# Patient Record
Sex: MALE | Race: WHITE | NOT HISPANIC OR LATINO | Employment: OTHER | URBAN - METROPOLITAN AREA
[De-identification: names, ages, dates, MRNs, and addresses within clinical notes are randomized per-mention and may not be internally consistent; named-entity substitution may affect disease eponyms.]

---

## 2017-01-24 ENCOUNTER — APPOINTMENT (OUTPATIENT)
Dept: LAB | Facility: CLINIC | Age: 82
End: 2017-01-24
Payer: COMMERCIAL

## 2017-01-24 DIAGNOSIS — I35.9 NONRHEUMATIC AORTIC VALVE DISORDER: ICD-10-CM

## 2017-01-24 DIAGNOSIS — I10 ESSENTIAL (PRIMARY) HYPERTENSION: ICD-10-CM

## 2017-01-24 LAB
ANION GAP SERPL CALCULATED.3IONS-SCNC: 8 MMOL/L (ref 4–13)
BUN SERPL-MCNC: 16 MG/DL (ref 5–25)
CALCIUM SERPL-MCNC: 8.9 MG/DL (ref 8.3–10.1)
CHLORIDE SERPL-SCNC: 106 MMOL/L (ref 100–108)
CO2 SERPL-SCNC: 28 MMOL/L (ref 21–32)
CREAT SERPL-MCNC: 0.69 MG/DL (ref 0.6–1.3)
GFR SERPL CREATININE-BSD FRML MDRD: >60 ML/MIN/1.73SQ M
GLUCOSE SERPL-MCNC: 121 MG/DL (ref 65–140)
POTASSIUM SERPL-SCNC: 3.7 MMOL/L (ref 3.5–5.3)
SODIUM SERPL-SCNC: 142 MMOL/L (ref 136–145)

## 2017-01-24 PROCEDURE — 36415 COLL VENOUS BLD VENIPUNCTURE: CPT

## 2017-01-24 PROCEDURE — 80048 BASIC METABOLIC PNL TOTAL CA: CPT

## 2017-02-01 ENCOUNTER — ALLSCRIPTS OFFICE VISIT (OUTPATIENT)
Dept: OTHER | Facility: OTHER | Age: 82
End: 2017-02-01

## 2017-02-27 ENCOUNTER — GENERIC CONVERSION - ENCOUNTER (OUTPATIENT)
Dept: OTHER | Facility: OTHER | Age: 82
End: 2017-02-27

## 2017-05-01 ENCOUNTER — ALLSCRIPTS OFFICE VISIT (OUTPATIENT)
Dept: OTHER | Facility: OTHER | Age: 82
End: 2017-05-01

## 2017-05-18 ENCOUNTER — ALLSCRIPTS OFFICE VISIT (OUTPATIENT)
Dept: OTHER | Facility: OTHER | Age: 82
End: 2017-05-18

## 2017-06-12 ENCOUNTER — ALLSCRIPTS OFFICE VISIT (OUTPATIENT)
Dept: OTHER | Facility: OTHER | Age: 82
End: 2017-06-12

## 2017-08-01 ENCOUNTER — ALLSCRIPTS OFFICE VISIT (OUTPATIENT)
Dept: OTHER | Facility: OTHER | Age: 82
End: 2017-08-01

## 2017-10-16 ENCOUNTER — ALLSCRIPTS OFFICE VISIT (OUTPATIENT)
Dept: OTHER | Facility: OTHER | Age: 82
End: 2017-10-16

## 2017-10-16 ENCOUNTER — GENERIC CONVERSION - ENCOUNTER (OUTPATIENT)
Dept: OTHER | Facility: OTHER | Age: 82
End: 2017-10-16

## 2017-12-04 ENCOUNTER — ALLSCRIPTS OFFICE VISIT (OUTPATIENT)
Dept: OTHER | Facility: OTHER | Age: 82
End: 2017-12-04

## 2017-12-05 NOTE — PROGRESS NOTES
Assessment  Assessed    1  Aortic valve disease (424 1) (I35 9)   2  Hypertension (401 9) (I10)   3  Pacemaker (V45 01) (Z95 0)   4  LVH (left ventricular hypertrophy) (429 3) (I51 7)    Plan  Aortic valve disease, Hypertension, LVH (left ventricular hypertrophy)    · Follow-up visit in 6 months Evaluation and Treatment  Follow-up  Status: Hold For -Scheduling  Requested for: 75YIB1857   Ordered; For: Aortic valve disease, Hypertension, LVH (left ventricular hypertrophy); Ordered By: Irineo Pan Performed:  Due: 19LJL9529  Atypical chest pain, Pacemaker    · EKG/ECG- POC; Status:Complete;   Done: 07SYL4524 02:01PM   Perform: In Office; Last Updated Yesy Knightsen; 12/4/2017 2:01:53 PM;Ordered;chest pain, Pacemaker; Ordered By:Karan Eller; Discussion/Summary  Cardiology Discussion Summary Free Text Note Form [facility]:   I will continue his present medical regimen  He appears well compensated  I've asked him to call if there is a problem in the interim otherwise I will see him in follow-up in 6 months time  Chief Complaint  Chief Complaint Free Text Note Form: 1 yr F/U with EKG      History of Present Illness  Cardiology HPI Free Text Note Form [facility]: Patient is here for a follow-up visit  He was last seen by me in June of this year  Since that time he has had ongoing interrogation of his Guidant dual-chamber pacemaker and it is functioning appropriately  A prior echo done in September of 2016 demonstrated preserved LV systolic function with mild LVH and no significant valvular heart disease  He has felt well  He has had no chest pain or significant dyspnea  Aortic Regurgitation (Brief): The patient is being seen for a routine clinic follow-up of aortic valve regurgitation  The patient is currently asymptomatic  Cardiomegaly: The patient is being seen for a routine clinic follow-up of cardiomegaly  The patient is currently asymptomatic  Hypertension (Follow-Up):  The patient presents for follow-up of essential hypertension  The patient states he has been doing well with his blood pressure control since the last visit  Symptoms:      Review of Systems  Cardiology Male ROS:    Cardiac: No complaints of chest pain, no palpitations, no fainiting  Skin: No complaints of nonhealing sores or skin rash  Genitourinary: No complaints of recurrent urinary tract infections, frequent urination at night, difficult urination, blood in urine, kidney stones, loss of bladder control, no kidney or prostate problems, no erectile dysfunction  Psychological: No complaints of feeling depressed, anxiety, panic attacks, or difficulty concentrating  General: No complaints of trouble sleeping, lack of energy, fatigue, appetite changes, weight changes, fever, frequent infections, or night sweats  Respiratory: No complaints of shortness of breath, cough with sputum, or wheezing  HEENT: No complaints of serious problems, hearing problems, nose problems, throat problems, or snoring  Gastrointestinal: No complaints of liver problems, nausea, vomiting, heartburn, constipation, bloody stools, diarrhea, problems swallowing, adbominal pain, or rectal bleeding  Hematologic: No complaints of bleeding disorders, anemia, blood clots, or excessive brusing  Neurological: No complaints of numbness, tingling, dizziness, weakness, seizures, headaches, syncope or fainting, AM fatigue, daytime sleepiness, no witnessed apnea episodes  Musculoskeletal: back pain, but-- No complaints of arthritis, back pain, or painfull swelling  Active Problems  Problems    1  Aortic valve disease (424 1) (I35 9)   2  Atypical chest pain (786 59) (R07 89)   3  Basal Cell Adenocarcinoma (199 1)   4  Bilateral shoulder pain, unspecified chronicity (719 41) (M25 511,M25 512)   5  Cervical radiculopathy (723 4) (M54 12)   6  Gastroenteritis (558 9) (K52 9)   7  Hypertension (401 9) (I10)   8  Hypokalemia (276 8) (E87 6)   9   LVH (left ventricular hypertrophy) (429 3) (I51 7)   10  Neck pain on right side (723 1) (M54 2)   11  Need for prophylactic vaccination and inoculation against influenza (V04 81) (Z23)   12  Pacemaker (V45 01) (Z95 0)   13  Shoulder pain (719 41) (M25 519)   14  Small bowel obstruction (560 9) (K56 609)   15  Spinal stenosis (724 00) (M48 00)   16  Upper respiratory infection (465 9) (J06 9)    Past Medical History  Problems    1  History of Bradycardia (427 89) (R00 1)   2  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)   3  Need for prophylactic vaccination and inoculation against influenza (V04 81) (Z23)  Active Problems And Past Medical History Reviewed: The active problems and past medical history were reviewed and updated today  Surgical History  Problems    1  History of Cholecystectomy   2  History of Pacemaker Permanent Placement Dual-Chamber    Family History  Mother    1  Family history of Diabetes   2  Family history of Heart disease    Social History  Problems    · Never a smoker   · No alcohol use   · Retired    Current Meds   1  AmLODIPine Besylate 5 MG Oral Tablet; TAKE 1 TABLET TWICE DAILY  Requested for: 63PWS5451; Last Rx:51Qma2790 Ordered   2  B Complex 50 Oral Tablet; TAKE 1 TABLET DAILY; Therapy: 60NOB7856 to Recorded   3  HydroCHLOROthiazide 12 5 MG Oral Capsule; take 1 capsule daily; Therapy: 79TGS8442 to (Evaluate:03Mar2018)  Requested for: 18RHH5621; Last Rx:85Coe4813 Ordered   4  Multiple Vitamins Oral Tablet; TAKE 1 TABLET DAILY; Therapy: 57JBO5459 to Recorded   5  Potassium Chloride ER 20 MEQ Oral Tablet Extended Release; take 2 tablet daily; Therapy: 83AFE8358 to (Evaluate:05Kmt6056)  Requested for: 25Mkt2922; Last Rx:10Gvf8048 Ordered  Medication List Reviewed: The medication list was reviewed and updated today  Allergies  Medication    1   No Known Drug Allergies    Vitals  Vital Signs    Recorded: 44UFO4237 01:58PM   Heart Rate 72, R Radial   Pulse Quality Regular, R Radial   Respiration 16   Systolic 525, RUE, Sitting   Diastolic 68, RUE, Sitting   Height 6 ft 2 in   Weight 216 lb 4 oz   BMI Calculated 27 77   BSA Calculated 2 25       Physical Exam   Constitutional  General appearance: No acute distress, well appearing and well nourished  Eyes  Conjunctiva and Sclera examination: Conjunctiva pink, sclera anicteric  Ears, Nose, Mouth, and Throat - Oropharynx: Clear, nares are clear, mucous membranes are moist   Neck  Neck and thyroid: Normal, supple, trachea midline, no thyromegaly  Pulmonary  Respiratory effort: No increased work of breathing or signs of respiratory distress  Auscultation of lungs: Clear to auscultation, no rales, no rhonchi, no wheezing, good air movement  Cardiovascular  Palpation of heart: Normal PMI, no thrills  Auscultation of heart: Normal rate and rhythm, normal S1 and S2, no murmurs  Carotid pulses: Normal, 2+ bilaterally  Examination of extremities for edema and/or varicosities: Normal    Chest - Chest: Normal   Musculoskeletal Gait and station: Normal gait  -- Digits and nails: Normal without clubbing or cyanosis  Neurologic - Speech: Normal    Psychiatric - Orientation to person, place, and time: Normal -- Mood and affect: Normal       Results/Data  ECG Report: Sinus bradycardia with first degree AV block and left axis deviation      Health Management  Health Maintenance   Medicare Annual Wellness Visit; every 1 year; Next Due: 70WFD3018;  Active    Future Appointments    Date/Time Provider Specialty Site   02/01/2018 02:00 PM Cardiology, [facility]       Signatures   Electronically signed by : MIGDALIA Sparks ; Dec  4 2017  2:18PM EST                       (Author)

## 2018-01-11 NOTE — MISCELLANEOUS
Message   Recorded as Task   Date: 07/19/2016 11:24 AM, Created By: Siena Sams   Task Name: Follow Up   Assigned To: Randi Silva procedure,Team   Regarding Patient: Lucille Andre, Status: Active   Lala Hicks - 19 Jul 2016 11:24 AM     TASK CREATED  pt is s/p  NOHEMY (M50 12)on07/15/16Eulalia Mon  - 26 Jul 2016 11:47 AM     TASK EDITED  no pain diary scanned in,  no follow up scheduled   Mackenzie Oscar - 28 Jul 2016 11:29 AM     TASK EDITED  Spoke with pt who received 80% relief from inj  Pt reports being able to sleep at night and just some occ soreness in shoulder  The r hand numb and tingling is much better and he feels more strength in hand  Pt asked if he could do massage therapy for muscle soreness  Pt will f/u PRN  Muna Woods - 28 Jul 2016 11:39 AM     TASK REPLIED TO: Previously Assigned To Muna Woods  sure ok to do massage therapy   Mackenzie Oscar - 28 Jul 2016 11:40 AM     TASK EDITED  Pt aware  Active Problems    1  Aortic valve disease (424 1) (I35 9)   2  Atypical chest pain (786 59) (R07 89)   3  Basal Cell Adenocarcinoma (199 1)   4  Bilateral shoulder pain, unspecified chronicity (719 41) (M25 511,M25 512)   5  Cervical radiculopathy (723 4) (M54 12)   6  Gastroenteritis (558 9) (K52 9)   7  Hypertension (401 9) (I10)   8  Hypokalemia (276 8) (E87 6)   9  LVH (left ventricular hypertrophy) (429 3) (I51 7)   10  Neck pain on right side (723 1) (M54 2)   11  Need for prophylactic vaccination and inoculation against influenza (V04 81) (Z23)   12  Pacemaker (V45 01) (Z95 0)   13  Shoulder pain (719 41) (M25 519)   14  Small bowel obstruction (560 9) (K56 69)   15  Spinal stenosis (724 00) (M48 00)   16  Upper respiratory infection (465 9) (J06 9)    Current Meds   1  AmLODIPine Besylate 5 MG Oral Tablet (Norvasc); TAKE 1 TABLET TWICE DAILY    Requested for: 86HZQ5219;  Last Rx:24Nov2015 Ordered   2  B Complex 50 Oral Tablet; TAKE 1 TABLET DAILY; Therapy: 77GEX9349 to Recorded   3  Hydrochlorothiazide 12 5 MG Oral Capsule; TAKE 1 CAPSULE ONCE DAILY  Requested   for: 21Mar2016; Last Rx:21Mar2016 Ordered   4  Ibuprofen TABS; TAKE TABLET  PRN; Therapy: (Recorded:23Jun2016) to Recorded   5  Multiple Vitamins Oral Tablet; TAKE 1 TABLET DAILY; Therapy: 01DZO9323 to Recorded    Allergies    1   No Known Drug Allergies    Signatures   Electronically signed by : Iker Mason, ; Jul 28 2016 11:40AM EST                       (Author)

## 2018-01-12 NOTE — RESULT NOTES
Verified Results  (1) CBC/PLT/DIFF 05GXP8161 86:39RK Leighton Robles   TW Order Number: [id number]    TW Order Number: [id number]     Test Name Result Flag Reference   WBC COUNT 9 22 Thousand/uL  4 31-10 16   RBC COUNT 4 17 Million/uL  3 88-5 62   HEMOGLOBIN 13 9 g/dL  12 0-17 0   HEMATOCRIT 40 2 %  36 5-49 3   MCV 96 fL  82-98   MCH 33 3 pg  26 8-34 3   MCHC 34 6 g/dL  31 4-37 4   RDW 13 8 %  11 6-15 1   MPV 11 1 fL  8 9-12 7   PLATELET COUNT 407 Thousands/uL  149-390   nRBC AUTOMATED 0 /100 WBCs     NEUTROPHILS RELATIVE PERCENT 72 %  43-75   LYMPHOCYTES RELATIVE PERCENT 16 %  14-44   MONOCYTES RELATIVE PERCENT 9 %  4-12   EOSINOPHILS RELATIVE PERCENT 3 %  0-6   BASOPHILS RELATIVE PERCENT 0 %  0-1   NEUTROPHILS ABSOLUTE COUNT 6 63 Thousands/µL  1 85-7 62   LYMPHOCYTES ABSOLUTE COUNT 1 43 Thousands/µL  0 60-4 47   MONOCYTES ABSOLUTE COUNT 0 78 Thousand/µL  0 17-1 22   EOSINOPHILS ABSOLUTE COUNT 0 31 Thousand/µL  0 00-0 61   BASOPHILS ABSOLUTE COUNT 0 04 Thousands/µL  0 00-0 10     (1) COMPREHENSIVE METABOLIC PANEL 56QRO3521 73:16TV Leighton Robles    Order Number: [id number]      National Kidney Disease Education Program recommendations are as follows:  GFR calculation is accurate only with a steady state creatinine  Chronic Kidney disease less than 60 ml/min/1 73 sq  meters  Kidney failure less than 15 ml/min/1 73 sq  meters  Test Name Result Flag Reference   GLUCOSE,RANDM 96 mg/dL     If the patient is fasting, the ADA then defines impaired fasting glucose as > 100 mg/dL and diabetes as > or equal to 123 mg/dL     SODIUM 141 mmol/L  136-145   POTASSIUM 3 3 mmol/L L 3 5-5 3   CHLORIDE 103 mmol/L  100-108   CARBON DIOXIDE 29 mmol/L  21-32   ANION GAP (CALC) 9 mmol/L  4-13   BLOOD UREA NITROGEN 18 mg/dL  5-25   CREATININE 0 73 mg/dL  0 60-1 30   Standardized to IDMS reference method   CALCIUM 8 6 mg/dL  8 3-10 1   BILI, TOTAL 0 93 mg/dL  0 20-1 00   ALK PHOSPHATAS 60 U/L     ALT (SGPT) 29 U/L  12-78 AST(SGOT) 31 U/L  5-45   ALBUMIN 3 9 g/dL  3 5-5 0   TOTAL PROTEIN 6 9 g/dL  6 4-8 2   eGFR Non-African American      >60 0 ml/min/1 73sq m     (1) LIPID PANEL, FASTING 27CVD3471 30:67RX Kevfrieda Heredia Order Number: [id number]      Triglyceride:         Normal              <150 mg/dl       Borderline High    150-199 mg/dl       High               200-499 mg/dl       Very High          >499 mg/dl  Cholesterol:         Desirable        <200 mg/dl      Borderline High  200-239 mg/dl      High             >239 mg/dl  HDL Cholesterol:        High    >59 mg/dL      Low     <41 mg/dL     Test Name Result Flag Reference   CHOLESTEROL 168 mg/dL     HDL,DIRECT 56 mg/dL  40-60   LDL CHOLESTEROL CALCULATED 101 mg/dL H 0-100   TRIGLYCERIDES 57 mg/dL  <=150   Specimen collection should occur prior to N-Acetylcysteine or Metamizole administration due to the potential for falsely depressed results  (1) TSH 23AAJ2670 66:83YA Lissa Heredia Order Number: [id number]    Patients undergoing fluorescein dye angiography may retain small amounts of fluorescein in the body for 48-72 hours post procedure  Samples containing fluorescein can produce falsely depressed TSH values  If the patient had this procedure,a specimen should be resubmitted post fluorescein clearance  Test Name Result Flag Reference   TSH 3 790 uIU/mL H 0 358-3 740     (1) VITAMIN D 25-HYDROXY 71LQU3900 52:98KU Lissa Heredia Order Number: [id number]     Order Number: [id number]     Test Name Result Flag Reference   VIT D 25-HYDROX 25 9 ng/mL L 30 0-100 0       Plan  Hypertension    · (1) BNP; Status:Hold For - Exact Date; Requested for:May 2016;     Discussion/Summary   bw shows vit D low at 25  I would increase otc Vit D3 by 1000 units/day  Also potassium still low  I would take script for potassium once daily for 10 days only and mail out diet sheet to INCREASE potassium  I will send in to pharm   recheck bw in 4 weeks

## 2018-01-13 NOTE — MISCELLANEOUS
Message   Recorded as Task   Date: 01/18/2017 12:05 PM, Created By: Tara Chowdary   Task Name: Follow Up   Assigned To: [facility] joseph clinical,Team   Regarding Patient: Yandel Sinclair, Status: In Progress   Comment:    Dana Molina - 18 Jan 2017 12:05 PM     TASK CREATED  Caller: [employer]; Authorization Status; [phone]  TC from [facility] at [facility] stating a Peer to Peer needs to be done with [facility] for the Cervical Spine CT that was done on 6/28/16  [facility] denied the initial authorization so payment has not been made  I informed [facility] that Dr Liam Andrade will not be back in the office until 1/23/17  Pt's ID # [id number]  [facility] ph # [phone]  Lester Hernandez - 06 Feb 2017 5:36 PM     TASK REPLIED TO: Previously Assigned To [facility] joseph clinical,Team   2 messages have been left and they have not contacted me back   Rachel Schofield - 07 Feb 2017 9:06 AM     TASK EDITED        Active Problems    1  Aortic valve disease (424 1) (I35 9)   2  Atypical chest pain (786 59) (R07 89)   3  Basal Cell Adenocarcinoma (199 1)   4  Bilateral shoulder pain, unspecified chronicity (719 41) (M25 511,M25 512)   5  Cervical radiculopathy (723 4) (M54 12)   6  Gastroenteritis (558 9) (K52 9)   7  Hypertension (401 9) (I10)   8  Hypokalemia (276 8) (E87 6)   9  LVH (left ventricular hypertrophy) (429 3) (I51 7)   10  Neck pain on right side (723 1) (M54 2)   11  Need for prophylactic vaccination and inoculation against influenza (V04 81) (Z23)   12  Pacemaker (V45 01) (Z95 0)   13  Shoulder pain (719 41) (M25 519)   14  Small bowel obstruction (560 9) (K56 69)   15  Spinal stenosis (724 00) (M48 00)   16  Upper respiratory infection (465 9) (J06 9)    Current Meds   1  AmLODIPine Besylate 5 MG Oral Tablet (Norvasc); TAKE 1 TABLET TWICE DAILY    Requested for: 21HXP3473; Last Rx:70Due4753 Ordered   2  B Complex 50 Oral Tablet; TAKE 1 TABLET DAILY; Therapy: 99XJT9352 to Recorded   3  HydroCHLOROthiazide 12 5 MG Oral Capsule; TAKE 1 CAPSULE ONCE DAILY    Requested for: 21Mar2016; Last Rx:21Mar2016 Ordered   4  Ibuprofen TABS; TAKE TABLET  PRN; Therapy: (Recorded:23Jun2016) to Recorded   5  Multiple Vitamins Oral Tablet; TAKE 1 TABLET DAILY; Therapy: 76WPY6241 to Recorded   6  Potassium Chloride ER 20 MEQ Oral Tablet Extended Release; take 2 tablet daily; Therapy: 99BEE2832 to (Evaluate:84Luk5703)  Requested for: 27Dec2016; Last   Rx:91Xqr8518 Ordered    Allergies    1   No Known Drug Allergies    Signatures   Electronically signed by : Martin Louise, ; Feb 27 2017  3:23PM EST                       (Author)

## 2018-01-14 VITALS
RESPIRATION RATE: 18 BRPM | DIASTOLIC BLOOD PRESSURE: 80 MMHG | BODY MASS INDEX: 27.9 KG/M2 | WEIGHT: 217.38 LBS | SYSTOLIC BLOOD PRESSURE: 140 MMHG | HEIGHT: 74 IN | TEMPERATURE: 96 F | HEART RATE: 60 BPM

## 2018-01-15 VITALS
WEIGHT: 218 LBS | DIASTOLIC BLOOD PRESSURE: 72 MMHG | HEIGHT: 74 IN | SYSTOLIC BLOOD PRESSURE: 142 MMHG | HEART RATE: 66 BPM | BODY MASS INDEX: 27.98 KG/M2

## 2018-01-15 NOTE — RESULT NOTES
Message   Recorded as Task   Date: 06/29/2016 10:14 AM, Created By: Torres Rajan   Task Name: Follow Up   Assigned To: Harpreet Soler   Regarding Patient: Radha Lopez, Status: Active   CommentAdrien Kanner - 29 Jun 2016 10:14 AM     TASK CREATED  reviewed CT scan results with patient; please schedule cervical BARBARA (Dx M50 12)   Dana Molina - 29 Jun 2016 3:10 PM     TASK REASSIGNED: Previously Assigned To [address] [place] [address] - 30 Jun 2016 10:55 AM     TASK REASSIGNED: Previously Assigned To [facility] surgery sched,Cally Groves - 30 Jun 2016 2:20 PM     TASK EDITED  S/W patient - patient scheduled for Friday, July 15 at [facility] with Dr Go Parrish - patient advised nothing to eat or drink 1 hour prior to procedure - patient on Ibuprofen, advised to stop 1 day prior to injection, patient denies any abx's - patient also advised to arrange for  - patient aware and agreed        Signatures   Electronically signed by : Mahnaz Alexander, ; Jun 30 2016  2:20PM EST                       (Author)

## 2018-01-17 NOTE — PROGRESS NOTES
Chief Complaint  Patient is here to receive the high dose influenza vaccination  Active Problems    1  Aortic valve disease (424 1) (I35 9)   2  Atypical chest pain (786 59) (R07 89)   3  Basal Cell Adenocarcinoma (199 1)   4  Bilateral shoulder pain, unspecified chronicity (719 41) (M25 511,M25 512)   5  Cervical radiculopathy (723 4) (M54 12)   6  Gastroenteritis (558 9) (K52 9)   7  Hypertension (401 9) (I10)   8  Hypokalemia (276 8) (E87 6)   9  LVH (left ventricular hypertrophy) (429 3) (I51 7)   10  Neck pain on right side (723 1) (M54 2)   11  Need for prophylactic vaccination and inoculation against influenza (V04 81) (Z23)   12  Pacemaker (V45 01) (Z95 0)   13  Shoulder pain (719 41) (M25 519)   14  Small bowel obstruction (560 9) (K56 69)   15  Spinal stenosis (724 00) (M48 00)   16  Upper respiratory infection (465 9) (J06 9)    Current Meds   1  AmLODIPine Besylate 5 MG Oral Tablet; TAKE 1 TABLET TWICE DAILY  Requested for:   02RFM8908; Last Rx:24Nov2015 Ordered   2  B Complex 50 Oral Tablet; TAKE 1 TABLET DAILY; Therapy: 41ICS4643 to Recorded   3  Hydrochlorothiazide 12 5 MG Oral Capsule; TAKE 1 CAPSULE ONCE DAILY  Requested   for: 21Mar2016; Last Rx:21Mar2016 Ordered   4  Ibuprofen TABS; TAKE TABLET  PRN; Therapy: (Recorded:23Jun2016) to Recorded   5  Multiple Vitamins Oral Tablet; TAKE 1 TABLET DAILY; Therapy: 90KPL6845 to Recorded    Allergies    1   No Known Drug Allergies    Vitals  Signs    Temperature: 98 2 F    Plan  Need for prophylactic vaccination and inoculation against influenza    · Fluzone High-Dose 0 5 ML Intramuscular Suspension Prefilled Syringe    Future Appointments    Date/Time Provider Specialty Site   11/09/2016 01:00 PM Cardiology, Device Remote  [address]     Signatures   Electronically signed by : MIGDALIA Holt ; Oct 14 4829  5:06PM EST                       (Author)

## 2018-01-22 VITALS — TEMPERATURE: 97.6 F

## 2018-01-23 VITALS
BODY MASS INDEX: 27.75 KG/M2 | SYSTOLIC BLOOD PRESSURE: 140 MMHG | HEART RATE: 72 BPM | DIASTOLIC BLOOD PRESSURE: 68 MMHG | HEIGHT: 74 IN | WEIGHT: 216.25 LBS | RESPIRATION RATE: 16 BRPM

## 2018-01-23 NOTE — PROGRESS NOTES
Chief Complaint  Patient is here for High Dose Flu Vaccine  Active Problems     1  Atypical chest pain (786 59) (R07 89)   2  Basal Cell Adenocarcinoma (199 1)   3  Bilateral shoulder pain, unspecified chronicity (719 41) (M25 511,M25 512)   4  Cervical radiculopathy (723 4) (M54 12)   5  Gastroenteritis (558 9) (K52 9)   6  Hypokalemia (276 8) (E87 6)   7  Neck pain on right side (723 1) (M54 2)   8  Need for prophylactic vaccination and inoculation against influenza (V04 81) (Z23)   9  Shoulder pain (719 41) (M25 519)   10  Small bowel obstruction (560 9) (K56 609)   11  Spinal stenosis (724 00) (M48 00)   12  Upper respiratory infection (465 9) (J06 9)    Hypertension (401 9) (I10)       Aortic valve disease (424 1) (I35 9)       LVH (left ventricular hypertrophy) (429 3) (I51 7)       Pacemaker (V45 01) (Z95 0)          Current Meds   1  AmLODIPine Besylate 5 MG Oral Tablet; TAKE 1 TABLET TWICE DAILY  Requested for:   52SHY2297; Last Rx:75Ctu3777 Ordered   2  B Complex 50 Oral Tablet; TAKE 1 TABLET DAILY; Therapy: 07OLE0774 to Recorded   3  HydroCHLOROthiazide 12 5 MG Oral Capsule; take 1 capsule daily; Therapy: 92NLH1954 to (Evaluate:31Jan2018)  Requested for: 73PPH2677; Last   Rx:14Nlu3869 Ordered   4  Multiple Vitamins Oral Tablet; TAKE 1 TABLET DAILY; Therapy: 25GHR3806 to Recorded   5  Potassium Chloride ER 20 MEQ Oral Tablet Extended Release; take 2 tablet daily; Therapy: 38CQD3177 to (Evaluate:66Pko7308)  Requested for: 47Tyb4871; Last   Rx:15Asm2655 Ordered    Allergies    1   No Known Drug Allergies    Vitals  Signs    Temperature: 97 6 F    Plan  Need for prophylactic vaccination and inoculation against influenza    · Fluzone High-Dose 0 5 ML Intramuscular Suspension Prefilled Syringe    Future Appointments    Date/Time Provider Specialty Site   02/01/2018 02:00 PM Cardiology, [facility]     Signatures   Electronically signed by : Albert Ellis M D ; Dec  7 2017  6:47AM EST                       (Author)

## 2018-02-01 ENCOUNTER — CLINICAL SUPPORT (OUTPATIENT)
Dept: CARDIOLOGY CLINIC | Facility: CLINIC | Age: 83
End: 2018-02-01
Payer: COMMERCIAL

## 2018-02-01 DIAGNOSIS — I44.2 CHB (COMPLETE HEART BLOCK) (HCC): Primary | ICD-10-CM

## 2018-02-01 DIAGNOSIS — Z95.0 PRESENCE OF PERMANENT CARDIAC PACEMAKER: ICD-10-CM

## 2018-02-01 DIAGNOSIS — I35.9 AORTIC VALVE DISEASE: Primary | ICD-10-CM

## 2018-02-01 PROCEDURE — 93280 PM DEVICE PROGR EVAL DUAL: CPT | Performed by: INTERNAL MEDICINE

## 2018-02-01 RX ORDER — AMLODIPINE BESYLATE 5 MG/1
1 TABLET ORAL 2 TIMES DAILY
COMMUNITY
End: 2018-02-01 | Stop reason: SDUPTHER

## 2018-02-01 RX ORDER — AMLODIPINE BESYLATE 5 MG/1
5 TABLET ORAL 2 TIMES DAILY
Qty: 60 TABLET | Refills: 5 | Status: SHIPPED | OUTPATIENT
Start: 2018-02-01 | End: 2018-09-06 | Stop reason: SDUPTHER

## 2018-02-01 NOTE — PROGRESS NOTES
DEVICE INTERROGATED IN THE [facility] OFFICE   BATTERY VOLTAGE ADEQUATE (6 5 YR)   AP 12%  30%   ALL LEAD PARAMETERS WITHIN NORMAL LIMITS   2 NEW NON-SUST V EVENTS WITH 1 EGM SHOWING PAT, AND 1 EGM SHOWING NSVT (5 @ 170 BPM)   NO PROGRAMMING CHANGES MADE TO DEVICE PARAMETERS   NORMAL DEVICE FUNCTION   RG      Current Outpatient Prescriptions:     amLODIPine (NORVASC) 5 mg tablet, Take 1 tablet (5 mg total) by mouth 2 (two) times a day, Disp: 60 tablet, Rfl: 5    Study date:  12-Sep-2016     Patient: Katty Crawford  MR number: [id number]  Account number: [de-identified]  : 1935  Age: 80 years  Gender: Male  Status: Outpatient  [facility] [address] [place] [address] [facility]  Height: 74 in  Weight: 214 5 lb  BP: 140/ 80 mmHg     Indications: Assess the aortic valve      Diagnoses: I35 1 - Nonrheumatic aortic (valve) insufficiency     Sonographer:  DEBORA Cifuentes  Primary Physician:  Cindi Mac MD  Referring Physician:  Enrike Cardenas MD  Group:  [facility]  Interpreting Physician:  Jose Sommesr MD     SUMMARY     LEFT VENTRICLE:  Systolic function was normal  Ejection fraction was estimated to be 60 %  There were no regional wall motion abnormalities  Wall thickness was mildly increased  There was mild concentric hypertrophy  Features were consistent with a pseudonormal left ventricular filling pattern,  with concomitant abnormal relaxation and increased filling pressure (grade 2  diastolic dysfunction)

## 2018-02-14 DIAGNOSIS — I35.9 AORTIC VALVE DISEASE: Primary | ICD-10-CM

## 2018-02-14 RX ORDER — POTASSIUM CHLORIDE 1500 MG/1
2 TABLET, FILM COATED, EXTENDED RELEASE ORAL DAILY
COMMUNITY
Start: 2016-11-23 | End: 2018-02-14 | Stop reason: SDUPTHER

## 2018-02-14 RX ORDER — POTASSIUM CHLORIDE 1500 MG/1
2 TABLET, FILM COATED, EXTENDED RELEASE ORAL DAILY
Qty: 60 TABLET | Refills: 5 | Status: SHIPPED | OUTPATIENT
Start: 2018-02-14 | End: 2018-09-10

## 2018-03-07 NOTE — PROGRESS NOTES
"  Discussion/Summary  Normal device function      Results/Data  Cardiac Device Remote [id number] 08:43AM Faviola Seen     Test Name Result Flag Reference   MISCELLANEOUS COMMENT (Report)     LATITUDE TRANSMISSION: BATTERY VOLTAGE ADEQUATE (8 YRS); AP = 5%,  = 18%; (1) NSVT EPISODE NOTED WITH DURATION @ 8 BEATS/AVG  MS; EF = 55-60% (4/3/14); PT TAKES NO BBLOCKER; TASK TO DR Zunilda Daley FOR REVIEW; ALL AVAILABLE LEAD PARAMETERS APPEAR WITHIN NORMAL LIMITS/STABLE; PACEMAKER FUNCTIONING APPROPRIATELY  eb   Cardiac Electrophysiology Report      ozpzpajnz[id number]01l96uBAiolwjzd  latitude  11 14 16 pdf   DEVICE TYPE Pacemaker       Cardiac Electrophysiology Report [id number] 08:43AM Faviola Seen     Test Name Result Flag Reference   Cardiac Electrophysiology Report      gbimhrowsgule[id number]  pdf     Signatures   Electronically signed by : Nedra Coyle, ; Nov 16 2016  3:01PM EST                       (Author)    Electronically signed by : MIGDALIA Martines ; Nov 16 2016  7:03PM EST                       (Author)    "

## 2018-03-07 NOTE — PROGRESS NOTES
"  Discussion/Summary  Normal device function      Results/Data  Cardiac Device Remote [id number] 04:41AM Counts include 234 beds at the [facility]     Test Name Result Flag Reference   MISCELLANEOUS COMMENT      LATITUDE TRANSMISSION: [id number] VOLTAGE ADEQUATE  (7 YRS)  AP 10%  29%  ALL AVAILABLE LEAD PARAMETERS WITHIN NORMAL LIMITS  NO SIGNIFICANT HIGH RATE EPISODES  NORMAL DEVICE FUNCTION  ---ROONEY   Cardiac Electrophysiology Report      eyoqrtnjhdauvhgmhshl[id number]  pdf   DEVICE TYPE Pacemaker       Cardiac Electrophysiology Report [id number] 04:41AM Counts include 234 beds at the [facility]     Test Name Result Flag Reference   Cardiac Electrophysiology Report      eyqxapqoqpjnqejvkyuxwtml[id number]  pdf     Signatures   Electronically signed by : Batsheva Robles, ; Aug  4 2017  3:10PM EST                       (Author)    Electronically signed by : Rolfe Angelucci, M D ; Aug  6 2017  8:07AM EST                       (Author)    "

## 2018-03-07 NOTE — PROGRESS NOTES
"  Discussion/Summary  Normal device function      Results/Data  Results   Cardiac Device Remote [id number] 05:19AM [employer]     Test Name Result Flag Reference   MISCELLANEOUS COMMENT      LATITUDE TRANSMISSION: [facility]  (8 5 YRS)  AP 9%  21%  ALL AVAILABLE LEAD PARAMETERS WITHIN NORMAL LIMITS  NO SIGNIFICANT HIGH RATE EPISODES  NORMAL DEVICE FUNCTION  ---ROONEY   Cardiac Electrophysiology Report      ihsjyovlkpckukfltkiksvskqw5qsbd9f85cb0m98d2l86oe6yhe33m[id number]  pdf   DEVICE TYPE Pacemaker       Cardiac Electrophysiology Report [id number] 05:19AM [employer] [facility]     Test Name Result Flag Reference   Cardiac Electrophysiology Report      qrqwwpiajmpvylejhwr[id number]  pdf     Signatures   Electronically signed by : Gerardo Thompson, ; May 10 2016  9:29AM EST                       (Author)    Electronically signed by : MIGDALIA Owens ; May 10 2016 12:47PM EST                       (Author)    "

## 2018-03-07 NOTE — PROGRESS NOTES
"  Discussion/Summary  Normal device function      Results/Data  Cardiac Device In Clinic [id number] 07:51PM Jesus Manuel Curtis     Test Name Result Flag Reference   MISCELLANEOUS COMMENT      DEVICE INTERROGATED IN THE [facility] OFFICE  [id number] VOLTAGE ADEQUATE  (7 5 YRS)  AP 6%  19%  ALL AVAILABLE LEAD PARAMETERS WITHIN NORMAL LIMITS  NO SIGNIFICANT HIGH RATE EPISODES  NORMAL DEVICE FUNCTION --ROONEY   Cardiac Electrophysiology Report      [id number]  pdf   DEVICE TYPE Pacemaker       Cardiac Electrophysiology Report [id number] 07:51PM Jesus Manuel Curtis     Test Name Result Flag Reference   Cardiac Electrophysiology Report      cxmxvywuoxolsyorf[id number]  pdf     Signatures   Electronically signed by : Bebeto Lopez, ; Feb 1 2017  3:21PM EST                       (Author)    Electronically signed by : MIGDALIA Mcgovern ; Feb 1 2017  4:01PM EST                       (Author)    "

## 2018-03-07 NOTE — PROGRESS NOTES
"  Discussion/Summary  Normal device function      Results/Data  Cardiac Device Remote [id number] 01:08PM Peyton Leys     Test Name Result Flag Reference   MISCELLANEOUS COMMENT      LATITUDE TRANSMISSION: BATTERY VOLTAGE ADEQUATE (7 YRS)  AP-8%, -28%  ALL AVAILABLE LEAD PARAMETERS WITHIN NORMAL LIMITS  NO SIGNIFICANT HIGH RATE EPISODES  NORMAL DEVICE FUNCTION  GV   Cardiac Electrophysiology Report      zsbsngihmgpsuccnolynlrtjgl4sbyf4z89xp2m71d[id number]  pdf   DEVICE TYPE Pacemaker       Cardiac Electrophysiology Report [id number] 01:08PM Peyton Leys     Test Name Result Flag Reference   Cardiac Electrophysiology Report      uekocyuhiznnbvolbwgqoygvv[id number]  pdf     Signatures   Electronically signed by : Kacy Boyer RN; May  2 2017 12:28PM EST                       (Author)    Electronically signed by : Sudhir Herbert DO; May  6 2017  6:00PM EST                       (Author)    "

## 2018-03-07 NOTE — PROGRESS NOTES
"  Discussion/Summary  Normal device function      Results/Data  Results   Cardiac Device In Clinic [id number] 04:56PM Vladimir Estrin     Test Name Result Flag Reference   MISCELLANEOUS COMMENT (Report)     DEVICE INTERROGATED IN THE [facility] OFFICE: BATTERY VOLTAGE ADEQUATE   31% AP 19%  2 NEW DEVICE CLASSIFIED VHRS NOTED, LONGEST 12 SECS  1 APPEARS STACH & OTHER NSVT  EF 55% (2014)  PT DOESN'T APPEAR TO BE ON BBLOCKER  DR Jan Ruiz TASKED  ALL AVAILABLE LEAD PARAMETERS WITHIN NORMAL LIMITS  NORMAL DEVICE FUNCTION  NC   Cardiac Electrophysiology Report      sglzrjwhalguf[id number]  pdf   DEVICE TYPE Pacemaker       Cardiac Electrophysiology Report [id number] 04:56PM Vladimir Estrin     Test Name Result Flag Reference   Cardiac Electrophysiology Report      axutoleqtlksjru[id number] pdf     Signatures   Electronically signed by : Brooklynn Coleman, ; Feb 10 2016 11:00AM EST                       (Author)    Electronically signed by : MIGDALIA Morales ; Feb 10 2016  3:30PM EST                       (Author)    "

## 2018-03-07 NOTE — PROGRESS NOTES
"  Discussion/Summary  Normal device function      Results/Data  Cardiac Device Remote [id number] 05:34AM Oralee Lipoma     Test Name Result Flag Reference   MISCELLANEOUS COMMENT      LATITUDE TRANSMISSION: [id number] VOLTAGE ADEQUATE  (8 YRS)  AP 9%  21%  ALL AVAILABLE LEAD PARAMETERS WITHIN NORMAL LIMITS  1 VHR EPISODE DETECTED 7 BEATS @ 305ms  EF 55% (2014)  PATIENT IS NOT ON BETA BLOCKER  NORMAL DEVICE FUNCTION  ---ROONEY   Cardiac Electrophysiology Report      eeptcemytfrgiivzmevklzdqfj1pljs7u97gd6d48w1q83g[id number]  pdf   DEVICE TYPE Pacemaker       Cardiac Electrophysiology Report [id number] 05:34AM Oralee Lipoma     Test Name Result Flag Reference   Cardiac Electrophysiology Report      oovuzrbbpmzhwbkkgfifviytlf9cief9i67hz7o99j3[id number]  pdf     Signatures   Electronically signed by : Daryl Ross, ; Aug 12 2016  1:40PM EST                       (Author)    Electronically signed by : Pennelope Kayser, M D ; Aug 17 2016  2:25PM EST                       (Author)    "

## 2018-03-13 DIAGNOSIS — I10 ESSENTIAL (PRIMARY) HYPERTENSION: Primary | ICD-10-CM

## 2018-03-13 RX ORDER — HYDROCHLOROTHIAZIDE 12.5 MG/1
CAPSULE, GELATIN COATED ORAL
Qty: 30 CAPSULE | Refills: 0 | Status: SHIPPED | OUTPATIENT
Start: 2018-03-13 | End: 2018-03-14 | Stop reason: SDUPTHER

## 2018-03-14 DIAGNOSIS — I10 ESSENTIAL (PRIMARY) HYPERTENSION: ICD-10-CM

## 2018-03-14 RX ORDER — HYDROCHLOROTHIAZIDE 12.5 MG/1
12.5 CAPSULE, GELATIN COATED ORAL DAILY
Qty: 30 CAPSULE | Refills: 5 | Status: SHIPPED | OUTPATIENT
Start: 2018-03-14 | End: 2019-08-05 | Stop reason: SDUPTHER

## 2018-05-03 ENCOUNTER — IN-CLINIC DEVICE VISIT (OUTPATIENT)
Dept: CARDIOLOGY CLINIC | Facility: CLINIC | Age: 83
End: 2018-05-03
Payer: COMMERCIAL

## 2018-05-03 DIAGNOSIS — I44.2 CHB (COMPLETE HEART BLOCK) (HCC): Primary | ICD-10-CM

## 2018-05-03 DIAGNOSIS — Z95.0 CARDIAC PACEMAKER IN SITU: ICD-10-CM

## 2018-05-03 PROCEDURE — 93294 REM INTERROG EVL PM/LDLS PM: CPT | Performed by: INTERNAL MEDICINE

## 2018-05-03 PROCEDURE — 93296 REM INTERROG EVL PM/IDS: CPT | Performed by: INTERNAL MEDICINE

## 2018-05-03 NOTE — PROGRESS NOTES
Results for orders placed or performed in visit on 18   Cardiac EP device report    Narrative    BSC-PM-DUAL  LATITUDE TRANSMISSION: BATTERY STATUS "OK"  AP 24%  47%  ALL AVAILABLE LEAD PARAMETERS WITHIN NORMAL LIMITS  1 NSVT NOTED, APPROX 18 BEATS @ 170 BPM  DR Ladan Good TASKED  1 PAT NOTED  EF 60% ()  NORMAL DEVICE FUNCTION  NC       Current Outpatient Prescriptions:     amLODIPine (NORVASC) 5 mg tablet, Take 1 tablet (5 mg total) by mouth 2 (two) times a day, Disp: 60 tablet, Rfl: 5    hydrochlorothiazide (MICROZIDE) 12 5 mg capsule, Take 1 capsule (12 5 mg total) by mouth daily, Disp: 30 capsule, Rfl: 5    Potassium Chloride ER 20 MEQ TBCR, Take 2 tablets (40 mEq total) by mouth daily, Disp: 60 tablet, Rfl: 5       Transthoracic Echocardiogram  2D, M-mode, Doppler, and Color Doppler     Study date:  12-Sep-2016     Patient: Davey Padilla  MR number: [id number]  Account number: [de-identified]  : 1935  Age: 80 years  Gender: Male  Status: Outpatient  [facility] [address] [place] [address] [facility]  Height: 74 in  Weight: 214 5 lb  BP: 140/ 80 mmHg     Indications: Assess the aortic valve      Diagnoses: I35 1 - Nonrheumatic aortic (valve) insufficiency     LEFT VENTRICLE:  Systolic function was normal  Ejection fraction was estimated to be 60 %

## 2018-05-04 ENCOUNTER — TELEPHONE (OUTPATIENT)
Dept: CARDIOLOGY CLINIC | Facility: CLINIC | Age: 83
End: 2018-05-04

## 2018-05-04 DIAGNOSIS — I47.1 SVT (SUPRAVENTRICULAR TACHYCARDIA) (HCC): Primary | ICD-10-CM

## 2018-05-04 NOTE — TELEPHONE ENCOUNTER
----- Message from Lawyer Kait MD sent at 5/3/2018  3:46 PM EDT -----  Patient had an episode of nonsustained VT on his recent pacer check  Would like him to start metoprolol 12 5 mg twice a day  Please call him  I will discuss with him at his follow-up visit    Thank you       ----- Message -----  From: Meaghan Mckeon  Sent: 5/3/2018   3:10 PM  To: Lawyer Kait MD    NSVT on pacer transmission

## 2018-05-08 DIAGNOSIS — I10 ESSENTIAL (PRIMARY) HYPERTENSION: ICD-10-CM

## 2018-05-09 RX ORDER — HYDROCHLOROTHIAZIDE 12.5 MG/1
CAPSULE, GELATIN COATED ORAL
Qty: 30 CAPSULE | Refills: 0 | Status: SHIPPED | OUTPATIENT
Start: 2018-05-09 | End: 2018-06-14 | Stop reason: SDUPTHER

## 2018-06-14 ENCOUNTER — OFFICE VISIT (OUTPATIENT)
Dept: CARDIOLOGY CLINIC | Facility: CLINIC | Age: 83
End: 2018-06-14
Payer: COMMERCIAL

## 2018-06-14 VITALS
WEIGHT: 198 LBS | SYSTOLIC BLOOD PRESSURE: 124 MMHG | BODY MASS INDEX: 25.41 KG/M2 | HEART RATE: 60 BPM | DIASTOLIC BLOOD PRESSURE: 80 MMHG | HEIGHT: 74 IN

## 2018-06-14 DIAGNOSIS — Z95.0 PACEMAKER: ICD-10-CM

## 2018-06-14 DIAGNOSIS — I51.7 LVH (LEFT VENTRICULAR HYPERTROPHY): ICD-10-CM

## 2018-06-14 DIAGNOSIS — I35.9 AORTIC VALVE DISEASE: ICD-10-CM

## 2018-06-14 DIAGNOSIS — I10 ESSENTIAL (PRIMARY) HYPERTENSION: Primary | ICD-10-CM

## 2018-06-14 PROCEDURE — 99214 OFFICE O/P EST MOD 30 MIN: CPT | Performed by: INTERNAL MEDICINE

## 2018-06-14 NOTE — PROGRESS NOTES
Cardiology Follow Up    Anette Ingram  1935  [id number]  [address] [facility]  [address]    1  Essential (primary) hypertension     2  Pacemaker     3  Aortic valve disease     4  LVH (left ventricular hypertrophy)         Interval History:  Patient is here for a follow-up visit  He was last seen by me in December of last year  Since that time he has had ongoing interrogation of his [employer] dual-chamber pacemaker  His most recent interrogation demonstrated an appropriately functioning device  He did have one episode of nonsustained ventricular tachycardia  I did place him on low-dose metoprolol and we will check a subsequent pacer check  He has had issues previously with hypokalemia and we will check this with his blood work  His most recent echocardiogram done September 2016 demonstrated preserved LV systolic function with mild LVH and no significant valvular heart disease  He has been feeling well  He has had no chest pain or significant dyspnea  There is no problem list on file for this patient  No past medical history on file  Social History     Social History    Marital status: /Civil Union     Spouse name: N/A    Number of children: N/A    Years of education: N/A     Occupational History    Not on file  Social History Main Topics    Smoking status: Not on file    Smokeless tobacco: Not on file    Alcohol use Not on file    Drug use: Unknown    Sexual activity: Not on file     Other Topics Concern    Not on file     Social History Narrative    No narrative on file      No family history on file  No past surgical history on file      Current Outpatient Prescriptions:     amLODIPine (NORVASC) 5 mg tablet, Take 1 tablet (5 mg total) by mouth 2 (two) times a day, Disp: 60 tablet, Rfl: 5    hydrochlorothiazide (MICROZIDE) 12 5 mg capsule, Take 1 capsule (12 5 mg total) by mouth daily, Disp: 30 capsule, Rfl: 5    hydrochlorothiazide (MICROZIDE) 12 5 mg capsule, TAKE 1 CAPSULE DAILY, Disp: 30 capsule, Rfl: 0    metoprolol tartrate (LOPRESSOR) 25 mg tablet, Take 0 5 tablets (12 5 mg total) by mouth every 12 (twelve) hours, Disp: 30 tablet, Rfl: 11    Potassium Chloride ER 20 MEQ TBCR, Take 2 tablets (40 mEq total) by mouth daily, Disp: 60 tablet, Rfl: 5  Allergies not on file    Labs:not applicable  Imaging: No results found  Review of Systems:  Review of Systems   All other systems reviewed and are negative  Physical Exam:  Physical Exam   Constitutional: He is oriented to person, place, and time  He appears well-developed and well-nourished  HENT:   Head: Normocephalic and atraumatic  Eyes: Conjunctivae are normal  Pupils are equal, round, and reactive to light  Neck: Normal range of motion  Neck supple  Cardiovascular: Normal rate and normal heart sounds  Pulmonary/Chest: Effort normal and breath sounds normal    Neurological: He is alert and oriented to person, place, and time  Skin: Skin is warm and dry  Psychiatric: He has a normal mood and affect  Vitals reviewed  Discussion/Summary:I will continue the patient's present medical regimen  The patient appears well compensated  I have asked the patient to call if there is a problem in the interim otherwise I will see the patient in six months time

## 2018-06-14 NOTE — PATIENT INSTRUCTIONS
I will continue the patient's present medical regimen  The patient appears well compensated  I have asked the patient to call if there is a problem in the interim otherwise I will see the patient in six months time  I have provided a slip for blood work to be done in the near future

## 2018-06-18 ENCOUNTER — APPOINTMENT (OUTPATIENT)
Dept: LAB | Facility: CLINIC | Age: 83
End: 2018-06-18
Payer: COMMERCIAL

## 2018-06-18 DIAGNOSIS — I10 ESSENTIAL (PRIMARY) HYPERTENSION: ICD-10-CM

## 2018-06-18 DIAGNOSIS — I51.7 LVH (LEFT VENTRICULAR HYPERTROPHY): ICD-10-CM

## 2018-06-18 DIAGNOSIS — I35.9 AORTIC VALVE DISEASE: ICD-10-CM

## 2018-06-18 DIAGNOSIS — Z95.0 PACEMAKER: ICD-10-CM

## 2018-06-18 LAB
ALBUMIN SERPL BCP-MCNC: 3.7 G/DL (ref 3.5–5)
ALP SERPL-CCNC: 53 U/L (ref 46–116)
ALT SERPL W P-5'-P-CCNC: 27 U/L (ref 12–78)
ANION GAP SERPL CALCULATED.3IONS-SCNC: 8 MMOL/L (ref 4–13)
AST SERPL W P-5'-P-CCNC: 24 U/L (ref 5–45)
BILIRUB SERPL-MCNC: 0.67 MG/DL (ref 0.2–1)
BUN SERPL-MCNC: 19 MG/DL (ref 5–25)
CALCIUM SERPL-MCNC: 9 MG/DL (ref 8.3–10.1)
CHLORIDE SERPL-SCNC: 106 MMOL/L (ref 100–108)
CHOLEST SERPL-MCNC: 142 MG/DL (ref 50–200)
CO2 SERPL-SCNC: 27 MMOL/L (ref 21–32)
CREAT SERPL-MCNC: 0.66 MG/DL (ref 0.6–1.3)
GFR SERPL CREATININE-BSD FRML MDRD: 89 ML/MIN/1.73SQ M
GLUCOSE P FAST SERPL-MCNC: 91 MG/DL (ref 65–99)
HDLC SERPL-MCNC: 53 MG/DL (ref 40–60)
LDLC SERPL CALC-MCNC: 78 MG/DL (ref 0–100)
NONHDLC SERPL-MCNC: 89 MG/DL
POTASSIUM SERPL-SCNC: 3.6 MMOL/L (ref 3.5–5.3)
PROT SERPL-MCNC: 7.2 G/DL (ref 6.4–8.2)
SODIUM SERPL-SCNC: 141 MMOL/L (ref 136–145)
TRIGL SERPL-MCNC: 57 MG/DL

## 2018-06-18 PROCEDURE — 80061 LIPID PANEL: CPT

## 2018-06-18 PROCEDURE — 80053 COMPREHEN METABOLIC PANEL: CPT

## 2018-06-18 PROCEDURE — 36415 COLL VENOUS BLD VENIPUNCTURE: CPT

## 2018-07-02 DIAGNOSIS — I10 ESSENTIAL (PRIMARY) HYPERTENSION: ICD-10-CM

## 2018-07-02 RX ORDER — HYDROCHLOROTHIAZIDE 12.5 MG/1
CAPSULE, GELATIN COATED ORAL
Qty: 30 CAPSULE | Refills: 0 | Status: SHIPPED | OUTPATIENT
Start: 2018-07-02 | End: 2018-09-06 | Stop reason: SDUPTHER

## 2018-07-30 DIAGNOSIS — I10 ESSENTIAL (PRIMARY) HYPERTENSION: Primary | ICD-10-CM

## 2018-07-30 RX ORDER — POTASSIUM CHLORIDE 20 MEQ/1
TABLET, EXTENDED RELEASE ORAL
Qty: 60 TABLET | Refills: 0 | Status: SHIPPED | OUTPATIENT
Start: 2018-07-30 | End: 2018-09-06 | Stop reason: SDUPTHER

## 2018-08-02 ENCOUNTER — REMOTE DEVICE CLINIC VISIT (OUTPATIENT)
Dept: CARDIOLOGY CLINIC | Facility: CLINIC | Age: 83
End: 2018-08-02
Payer: COMMERCIAL

## 2018-08-02 DIAGNOSIS — Z95.0 PRESENCE OF CARDIAC PACEMAKER: Primary | ICD-10-CM

## 2018-08-02 DIAGNOSIS — I44.30 AVB (ATRIOVENTRICULAR BLOCK): ICD-10-CM

## 2018-08-02 PROCEDURE — 93296 REM INTERROG EVL PM/IDS: CPT | Performed by: INTERNAL MEDICINE

## 2018-08-02 PROCEDURE — 93294 REM INTERROG EVL PM/LDLS PM: CPT | Performed by: INTERNAL MEDICINE

## 2018-08-02 NOTE — PROGRESS NOTES
Results for orders placed or performed in visit on 08/02/18   Cardiac EP device report    Narrative    BSC-PM-DUAL  LATITUDE TRANSMISSION: BATTERY VOLTAGE ADEQUATE (6 YRS)  AP: 34%  : 49% (> 40% DDD/50)  ALL AVAILABLE LEAD PARAMETERS WITHIN NORMAL LIMITS  NO SIGNIFICANT HIGH RATE EPISODES  PACEMAKER FUNCTIONING APPROPRIATELY   [address] [place] [address]

## 2018-09-06 DIAGNOSIS — I10 ESSENTIAL (PRIMARY) HYPERTENSION: ICD-10-CM

## 2018-09-06 DIAGNOSIS — I35.9 AORTIC VALVE DISEASE: ICD-10-CM

## 2018-09-06 RX ORDER — HYDROCHLOROTHIAZIDE 12.5 MG/1
CAPSULE, GELATIN COATED ORAL
Qty: 30 CAPSULE | Refills: 10 | Status: SHIPPED | OUTPATIENT
Start: 2018-09-06 | End: 2018-09-10

## 2018-09-06 RX ORDER — AMLODIPINE BESYLATE 5 MG/1
TABLET ORAL
Qty: 60 TABLET | Refills: 10 | Status: SHIPPED | OUTPATIENT
Start: 2018-09-06 | End: 2019-08-05 | Stop reason: SDUPTHER

## 2018-09-06 RX ORDER — POTASSIUM CHLORIDE 20 MEQ/1
TABLET, EXTENDED RELEASE ORAL
Qty: 60 TABLET | Refills: 3 | Status: SHIPPED | OUTPATIENT
Start: 2018-09-06 | End: 2019-02-06 | Stop reason: SDUPTHER

## 2018-09-10 ENCOUNTER — APPOINTMENT (EMERGENCY)
Dept: CT IMAGING | Facility: HOSPITAL | Age: 83
End: 2018-09-10
Payer: COMMERCIAL

## 2018-09-10 ENCOUNTER — APPOINTMENT (EMERGENCY)
Dept: RADIOLOGY | Facility: HOSPITAL | Age: 83
End: 2018-09-10
Payer: COMMERCIAL

## 2018-09-10 ENCOUNTER — HOSPITAL ENCOUNTER (EMERGENCY)
Facility: HOSPITAL | Age: 83
Discharge: HOME | End: 2018-09-10
Attending: EMERGENCY MEDICINE
Payer: COMMERCIAL

## 2018-09-10 VITALS
DIASTOLIC BLOOD PRESSURE: 57 MMHG | BODY MASS INDEX: 27.09 KG/M2 | TEMPERATURE: 98.8 F | RESPIRATION RATE: 18 BRPM | HEART RATE: 52 BPM | SYSTOLIC BLOOD PRESSURE: 108 MMHG | OXYGEN SATURATION: 95 % | HEIGHT: 73 IN | WEIGHT: 204.37 LBS

## 2018-09-10 DIAGNOSIS — J18.9 PNEUMONIA: Primary | ICD-10-CM

## 2018-09-10 LAB
ALBUMIN SERPL BCP-MCNC: 3.1 G/DL (ref 3.5–5)
ALP SERPL-CCNC: 69 U/L (ref 46–116)
ALT SERPL W P-5'-P-CCNC: 44 U/L (ref 12–78)
ANION GAP SERPL CALCULATED.3IONS-SCNC: 8 MMOL/L (ref 4–13)
APTT PPP: 41 SECONDS (ref 24–36)
AST SERPL W P-5'-P-CCNC: 31 U/L (ref 5–45)
ATRIAL RATE: 63 BPM
BASOPHILS # BLD AUTO: 0.03 THOUSANDS/ΜL (ref 0–0.1)
BASOPHILS NFR BLD AUTO: 0 % (ref 0–1)
BILIRUB SERPL-MCNC: 1.2 MG/DL (ref 0.2–1)
BUN SERPL-MCNC: 16 MG/DL (ref 5–25)
CALCIUM SERPL-MCNC: 8.6 MG/DL (ref 8.3–10.1)
CHLORIDE SERPL-SCNC: 103 MMOL/L (ref 100–108)
CO2 SERPL-SCNC: 27 MMOL/L (ref 21–32)
CREAT SERPL-MCNC: 0.76 MG/DL (ref 0.6–1.3)
EOSINOPHIL # BLD AUTO: 0.16 THOUSAND/ΜL (ref 0–0.61)
EOSINOPHIL NFR BLD AUTO: 1 % (ref 0–6)
ERYTHROCYTE [DISTWIDTH] IN BLOOD BY AUTOMATED COUNT: 12.8 % (ref 11.6–15.1)
GFR SERPL CREATININE-BSD FRML MDRD: 84 ML/MIN/1.73SQ M
GLUCOSE SERPL-MCNC: 119 MG/DL (ref 65–140)
HCT VFR BLD AUTO: 35.1 % (ref 36.5–49.3)
HGB BLD-MCNC: 11.8 G/DL (ref 12–17)
IMM GRANULOCYTES # BLD AUTO: 0.07 THOUSAND/UL (ref 0–0.2)
IMM GRANULOCYTES NFR BLD AUTO: 1 % (ref 0–2)
INR PPP: 1.13 (ref 0.86–1.17)
LACTATE SERPL-SCNC: 0.8 MMOL/L (ref 0.5–2)
LYMPHOCYTES # BLD AUTO: 1.03 THOUSANDS/ΜL (ref 0.6–4.47)
LYMPHOCYTES NFR BLD AUTO: 9 % (ref 14–44)
MCH RBC QN AUTO: 33.6 PG (ref 26.8–34.3)
MCHC RBC AUTO-ENTMCNC: 33.6 G/DL (ref 31.4–37.4)
MCV RBC AUTO: 100 FL (ref 82–98)
MONOCYTES # BLD AUTO: 1.1 THOUSAND/ΜL (ref 0.17–1.22)
MONOCYTES NFR BLD AUTO: 9 % (ref 4–12)
NEUTROPHILS # BLD AUTO: 9.74 THOUSANDS/ΜL (ref 1.85–7.62)
NEUTS SEG NFR BLD AUTO: 80 % (ref 43–75)
NRBC BLD AUTO-RTO: 0 /100 WBCS
P AXIS: 79 DEGREES
PLATELET # BLD AUTO: 213 THOUSANDS/UL (ref 149–390)
PMV BLD AUTO: 11.3 FL (ref 8.9–12.7)
POTASSIUM SERPL-SCNC: 3.5 MMOL/L (ref 3.5–5.3)
PR INTERVAL: 344 MS
PROT SERPL-MCNC: 6.8 G/DL (ref 6.4–8.2)
PROTHROMBIN TIME: 14.2 SECONDS (ref 11.8–14.2)
QRS AXIS: 33 DEGREES
QRSD INTERVAL: 104 MS
QT INTERVAL: 402 MS
QTC INTERVAL: 411 MS
RBC # BLD AUTO: 3.51 MILLION/UL (ref 3.88–5.62)
SODIUM SERPL-SCNC: 138 MMOL/L (ref 136–145)
T WAVE AXIS: 67 DEGREES
TROPONIN I SERPL-MCNC: <0.02 NG/ML
VENTRICULAR RATE: 63 BPM
WBC # BLD AUTO: 12.13 THOUSAND/UL (ref 4.31–10.16)

## 2018-09-10 PROCEDURE — 80053 COMPREHEN METABOLIC PANEL: CPT | Performed by: EMERGENCY MEDICINE

## 2018-09-10 PROCEDURE — 85610 PROTHROMBIN TIME: CPT | Performed by: EMERGENCY MEDICINE

## 2018-09-10 PROCEDURE — 85025 COMPLETE CBC W/AUTO DIFF WBC: CPT | Performed by: EMERGENCY MEDICINE

## 2018-09-10 PROCEDURE — 36415 COLL VENOUS BLD VENIPUNCTURE: CPT | Performed by: EMERGENCY MEDICINE

## 2018-09-10 PROCEDURE — 93005 ELECTROCARDIOGRAM TRACING: CPT

## 2018-09-10 PROCEDURE — 71250 CT THORAX DX C-: CPT

## 2018-09-10 PROCEDURE — 71046 X-RAY EXAM CHEST 2 VIEWS: CPT

## 2018-09-10 PROCEDURE — 85730 THROMBOPLASTIN TIME PARTIAL: CPT | Performed by: EMERGENCY MEDICINE

## 2018-09-10 PROCEDURE — 99285 EMERGENCY DEPT VISIT HI MDM: CPT

## 2018-09-10 PROCEDURE — 83605 ASSAY OF LACTIC ACID: CPT | Performed by: EMERGENCY MEDICINE

## 2018-09-10 PROCEDURE — 93010 ELECTROCARDIOGRAM REPORT: CPT | Performed by: INTERNAL MEDICINE

## 2018-09-10 PROCEDURE — 84484 ASSAY OF TROPONIN QUANT: CPT | Performed by: EMERGENCY MEDICINE

## 2018-09-10 RX ORDER — AZITHROMYCIN 250 MG/1
250 TABLET, FILM COATED ORAL DAILY
Qty: 4 TABLET | Refills: 0 | Status: SHIPPED | OUTPATIENT
Start: 2018-09-11 | End: 2018-09-15

## 2018-09-10 RX ORDER — AZITHROMYCIN 250 MG/1
500 TABLET, FILM COATED ORAL ONCE
Status: COMPLETED | OUTPATIENT
Start: 2018-09-10 | End: 2018-09-10

## 2018-09-10 RX ORDER — NITROGLYCERIN 0.4 MG/1
0.4 TABLET SUBLINGUAL ONCE
Status: COMPLETED | OUTPATIENT
Start: 2018-09-10 | End: 2018-09-10

## 2018-09-10 RX ORDER — ASPIRIN 81 MG/1
324 TABLET, CHEWABLE ORAL ONCE
Status: COMPLETED | OUTPATIENT
Start: 2018-09-10 | End: 2018-09-10

## 2018-09-10 RX ADMIN — AZITHROMYCIN 500 MG: 250 TABLET, FILM COATED ORAL at 09:02

## 2018-09-10 RX ADMIN — NITROGLYCERIN 0.4 MG: 0.4 TABLET SUBLINGUAL at 06:03

## 2018-09-10 RX ADMIN — ASPIRIN 81 MG 324 MG: 81 TABLET ORAL at 06:02

## 2018-09-10 NOTE — ED PROVIDER NOTES
History  Chief Complaint   Patient presents with    Chest Pain     per pt  "he has been having some chest pain for some days now which has progerssively gotten worse, pt states he has a left sided chest pain, which does not radiate, pt also complains of some cough that started about 3 days ago "     A 80-year-old male presents to the emergency department gesturing to the left upper chest relating it hurts up there    He relates I do not know why    He notes he has had pain at times before  He relates that the and discomfort intensified after midnight  He relates that it was never this bad    He has difficulty describing the discomfort relating only that it is pain    He relates that moving hurts and that it feels like a pulled muscle   He does not recall any activity which would have aggravated the area  He additionally notes that he has a cough that will go away    This is mostly dry  Patient relates that over the past few days he has had elevated temperatures at home  He notes that he has used a thermometer that cages is temperature in the ear  He notes that his temperature is normally 97 6  Over the past few days he has had elevations including just prior to coming in up to 101 2  Patient relates that he does have a pacemaker and describes this having been placed for bradycardia  He denies having any other significant medical conditions  Prior to Admission Medications   Prescriptions Last Dose Informant Patient Reported? Taking?    B Complex Vitamins (VITAMIN B COMPLEX PO)  Self Yes Yes   Sig: Take 1 tablet by mouth daily   Multiple Vitamins-Minerals (MULTIVITAMIN ADULT PO)  Self Yes Yes   Sig: Take 1 tablet by mouth daily   amLODIPine (NORVASC) 5 mg tablet   No Yes   Sig: TAKE 1 TABLET TWICE DAILY   hydrochlorothiazide (MICROZIDE) 12 5 mg capsule  Self No Yes   Sig: Take 1 capsule (12 5 mg total) by mouth daily   metoprolol tartrate (LOPRESSOR) 25 mg tablet  Self No Yes   Sig: Take 0 5 tablets (12 5 mg total) by mouth every 12 (twelve) hours   potassium chloride (K-DUR,KLOR-CON) 20 mEq tablet   No Yes   Sig: TAKE 2 TABLETS DAILY AS DIRECTED  Facility-Administered Medications: None       Past Medical History:   Diagnosis Date    Basal cell carcinoma     Hypertension     Kidney stones     Pacemaker        Past Surgical History:   Procedure Laterality Date    CHOLECYSTECTOMY      EYE SURGERY      TONSILLECTOMY         History reviewed  No pertinent family history  I have reviewed and agree with the history as documented  Social History   Substance Use Topics    Smoking status: Never Smoker    Smokeless tobacco: Never Used    Alcohol use Yes      Comment: occasionally        Review of Systems   All other systems reviewed and are negative  Physical Exam  Physical Exam   Constitutional: He is oriented to person, place, and time  He appears well-developed and well-nourished  HENT:   Head: Normocephalic  Eyes: Conjunctivae and EOM are normal    Cardiovascular: Normal rate, regular rhythm and normal heart sounds  Pulmonary/Chest: Effort normal and breath sounds normal  He exhibits tenderness (Left upper chest wall)  Abdominal: Soft  Bowel sounds are normal    Musculoskeletal: Normal range of motion  He exhibits no edema or tenderness  Neurological: He is alert and oriented to person, place, and time  Skin: Skin is warm and dry  Psychiatric: He has a normal mood and affect  His behavior is normal    Nursing note and vitals reviewed        Vital Signs  ED Triage Vitals   Temperature Pulse Respirations Blood Pressure SpO2   09/10/18 0530 09/10/18 0530 09/10/18 0530 09/10/18 0530 09/10/18 0530   98 8 °F (37 1 °C) 61 18 153/69 94 %      Temp Source Heart Rate Source Patient Position - Orthostatic VS BP Location FiO2 (%)   09/10/18 0530 09/10/18 0530 09/10/18 0530 09/10/18 0530 --   Oral Monitor Lying Right arm       Pain Score       09/10/18 0630       5 Vitals:    09/10/18 0630 09/10/18 0645 09/10/18 0730 09/10/18 0800   BP: 100/56  106/56 117/62   Pulse: (!) 52 (!) 50 (!) 50 (!) 50   Patient Position - Orthostatic VS: Lying          Visual Acuity      ED Medications  Medications   azithromycin (ZITHROMAX) tablet 500 mg (not administered)   aspirin chewable tablet 324 mg (324 mg Oral Given 9/10/18 0602)   nitroglycerin (NITROSTAT) SL tablet 0 4 mg (0 4 mg Sublingual Given 9/10/18 0603)       Diagnostic Studies  Results Reviewed     Procedure Component Value Units Date/Time    Lactic acid, plasma [[id number]]  (Normal) Collected:  09/10/18 0550    Lab Status:  Final result Specimen:  Blood from Arm, Left Updated:  09/10/18 7878     LACTIC ACID 0 8 mmol/L     Narrative:         Result may be elevated if tourniquet was used during collection  Troponin I [[id number]]  (Normal) Collected:  09/10/18 0550    Lab Status:  Final result Specimen:  Blood from Arm, Left Updated:  09/10/18 0619     Troponin I <0 02 ng/mL     Comprehensive metabolic panel [[id number]]  (Abnormal) Collected:  09/10/18 0550    Lab Status:  Final result Specimen:  Blood from Arm, Left Updated:  09/10/18 1540     Sodium 138 mmol/L      Potassium 3 5 mmol/L      Chloride 103 mmol/L      CO2 27 mmol/L      ANION GAP 8 mmol/L      BUN 16 mg/dL      Creatinine 0 76 mg/dL      Glucose 119 mg/dL      Calcium 8 6 mg/dL      AST 31 U/L      ALT 44 U/L      Alkaline Phosphatase 69 U/L      Total Protein 6 8 g/dL      Albumin 3 1 (L) g/dL      Total Bilirubin 1 20 (H) mg/dL      eGFR 84 ml/min/1 73sq m     Narrative:         National Kidney Disease Education Program recommendations are as follows:  GFR calculation is accurate only with a steady state creatinine  Chronic Kidney disease less than 60 ml/min/1 73 sq  meters  Kidney failure less than 15 ml/min/1 73 sq  meters      Alveda Kos [[id number]]  (Normal) Collected:  09/10/18 0550    Lab Status:  Final result Specimen:  Blood from Arm, Left Updated: 09/10/18 0616     Protime 14 2 seconds      INR 1 13    APTT [[id number]]  (Abnormal) Collected:  09/10/18 0550    Lab Status:  Final result Specimen:  Blood from Arm, Left Updated:  09/10/18 0616     PTT 41 (H) seconds     CBC and differential [[id number]]  (Abnormal) Collected:  09/10/18 0550    Lab Status:  Final result Specimen:  Blood from Arm, Left Updated:  09/10/18 0601     WBC 12 13 (H) Thousand/uL      RBC 3 51 (L) Million/uL      Hemoglobin 11 8 (L) g/dL      Hematocrit 35 1 (L) %       (H) fL      MCH 33 6 pg      MCHC 33 6 g/dL      RDW 12 8 %      MPV 11 3 fL      Platelets 993 Thousands/uL      nRBC 0 /100 WBCs      Neutrophils Relative 80 (H) %      Immat GRANS % 1 %      Lymphocytes Relative 9 (L) %      Monocytes Relative 9 %      Eosinophils Relative 1 %      Basophils Relative 0 %      Neutrophils Absolute 9 74 (H) Thousands/µL      Immature Grans Absolute 0 07 Thousand/uL      Lymphocytes Absolute 1 03 Thousands/µL      Monocytes Absolute 1 10 Thousand/µL      Eosinophils Absolute 0 16 Thousand/µL      Basophils Absolute 0 03 Thousands/µL                  CT chest without contrast   Final Result by Choco Horvath DO (09/10 8207)      Findings most compatible with multilobar pneumonia  Nodular area of consolidation in the right middle lobe without internal bronchograms, also favored to represent pneumonia  Follow-up CT chest in 8 weeks following treatment is recommended to ensure    resolution  Small bilateral pleural effusions and tiny pericardial effusion  4 4 cm ascending aortic aneurysm  Workstation performed: [id number]         X-ray chest 2 views   ED Interpretation by Vasiliy Ralph MD (31/22 8739)   Opacity in the left hilar and right middle region of unclear etiology      Final Result by Amirah Mayberry MD (20/89 5854)      Enlarged left hilar and infrahilar region for which metastasis is not excluded    Additional nodular densities in the right mid to lower lung field  A CT has already been ordered for further evaluation  As per comments in the PACS workstation, findings are concordant with preliminary interpretation provided by the emergency room physician  Workstation performed: [id number]                    Procedures  ECG 12 Lead Documentation  Date/Time: 9/10/2018 6:42 AM  Performed by: Binh Meadows  Authorized by: Binh Meadows     ECG reviewed by me, the ED Provider: yes    Patient location:  ED and bedside  Previous ECG:     Previous ECG:  Compared to current    Comparison ECG info:  April 7, 2015  Rate:     ECG rate:  63    ECG rate assessment: normal    Rhythm:     Rhythm: sinus rhythm and A-V block      Rhythm comment:  First degree  Ectopy:     Ectopy: none    QRS:     QRS axis:  Normal    QRS intervals:  Normal  Conduction:     Conduction: normal    ST segments:     ST segments:  Normal  T waves:     T waves: normal             Phone Contacts  ED Phone Contact    ED Course  ED Course as of Sep 10 0901   Mon Sep 10, 2018   0708 Patient reports feeling similarly  He suggests maybe I just pulled a muscle    Chest x-ray does look different from that 4 years ago with prominence in the left hilar region and right middle lobe  CT scan pending  Patient aware  Initial Sepsis Screening     Row Name 09/10/18 0521                Is the patient's history suggestive of a new or worsening infection? (!)  Yes (Proceed)  -SZ        Suspected source of infection pneumonia  -SZ        Are two or more of the following signs & symptoms of infection both present and new to the patient?  No  -SZ        Indicate SIRS criteria Leukocytosis (WBC > 43265 IJL)  -SZ        If the answer is yes to both questions, suspicion of sepsis is present          If severe sepsis is present AND tissue hypoperfusion perists in the hour after fluid resuscitation or lactate > 4, the patient meets criteria for SEPTIC SHOCK          Are any of the following organ dysfunction criteria present within 6 hours of suspected infection and SIRS criteria that are NOT considered to be chronic conditions? No  -SZ        Organ dysfunction          Date of presentation of severe sepsis          Time of presentation of severe sepsis          Tissue hypoperfusion persists in the hour after crystalloid fluid administration, evidenced, by either:          Was hypotension present within one hour of the conclusion of crystalloid fluid administration?         Date of presentation of septic shock          Time of presentation of septic shock            User Key  (r) = Recorded By, (t) = Taken By, (c) = Cosigned By    Initials Name Provider Type    SREEDHAR Lemus MD Physician                  MDM  CritCare Time    Disposition  Final diagnoses:   Pneumonia     Time reflects when diagnosis was documented in both MDM as applicable and the Disposition within this note     Time User Action Codes Description Comment    9/10/2018  8:52 AM Margo Cho [J18 [id number]] Pneumonia       ED Disposition     ED Disposition Condition Comment    Discharge  Yahaira Barros discharge to home/self care  Condition at discharge: Good        Follow-up Information     Follow up With Specialties Details Why Jordy Bowers MD Family Medicine Schedule an appointment as soon as possible for a visit For re-evaluation in the next 4 to 7 days  Additional imaging will be required in approximately 8 weeks to ensure resolution of abnormality visualized on CT scan   [address]            Patient's Medications   Discharge Prescriptions    AZITHROMYCIN (ZITHROMAX) 250 MG TABLET    Take 1 tablet (250 mg total) by mouth daily for 4 days Take 2 tablets today then 1 tablet daily x 4 days       Start Date: 9/11/2018 End Date: 9/15/2018       Order Dose: 250 mg Quantity: 4 tablet    Refills: 0     No discharge procedures on file      ED Provider  Electronically Signed by           Dimas Souza MD  09/10/18 0636

## 2018-09-10 NOTE — SEPSIS NOTE
Sepsis Note   Emma Humphrey 80 y o  male MRN: [id number]  Unit/Bed#: ED 25 Encounter: [id number]            Initial Sepsis Screening     Row Name 09/10/18 3802                Is the patient's history suggestive of a new or worsening infection? (!)  Yes (Proceed)  -SZ        Suspected source of infection pneumonia  -SZ        Are two or more of the following signs & symptoms of infection both present and new to the patient? No  -SZ        Indicate SIRS criteria Leukocytosis (WBC > 99535 IJL)  -SZ        If the answer is yes to both questions, suspicion of sepsis is present          If severe sepsis is present AND tissue hypoperfusion perists in the hour after fluid resuscitation or lactate > 4, the patient meets criteria for SEPTIC SHOCK          Are any of the following organ dysfunction criteria present within 6 hours of suspected infection and SIRS criteria that are NOT considered to be chronic conditions? No  -SZ        Organ dysfunction          Date of presentation of severe sepsis          Time of presentation of severe sepsis          Tissue hypoperfusion persists in the hour after crystalloid fluid administration, evidenced, by either:          Was hypotension present within one hour of the conclusion of crystalloid fluid administration?           Date of presentation of septic shock          Time of presentation of septic shock            User Key  (r) = Recorded By, (t) = Taken By, (c) = Cosigned By    [address] Name Provider Type    SREEDHAR Macias MD Physician

## 2018-09-10 NOTE — DISCHARGE INSTRUCTIONS
Community Acquired Pneumonia   WHAT YOU NEED TO KNOW:   Community-acquired pneumonia (CAP) is a lung infection that you get outside of a hospital or nursing home setting  Your lungs become inflamed and cannot work well  CAP may be caused by bacteria, viruses, or fungi  DISCHARGE INSTRUCTIONS:   Seek care immediately if:   · You are confused and cannot think clearly  · You have increased trouble breathing  · Your lips or fingernails turn gray or blue  Contact your healthcare provider if:   · Your symptoms do not get better, or they get worse  · You are urinating less, or not at all  · You have questions or concerns about your condition or care  Medicines:   · Medicines  may be given to treat a bacterial, viral, or fungal infection  You may also be given medicines to dilate your bronchial tubes to help you breathe more easily  · Take your medicine as directed  Contact your healthcare provider if you think your medicine is not helping or if you have side effects  Tell him or her if you are allergic to any medicine  Keep a list of the medicines, vitamins, and herbs you take  Include the amounts, and when and why you take them  Bring the list or the pill bottles to follow-up visits  Carry your medicine list with you in case of an emergency  Follow up with your healthcare provider within 3 days or as directed: You may need another x-ray  Write down your questions so you remember to ask them during your visits  Deep breathing and coughing:  Deep breathing helps open the air passages in your lungs  Coughing helps bring up mucus from your lungs  Take a deep breath and hold the breath as long as you can  Then push the air out of your lungs with a deep, strong cough  Spit out any mucus you have coughed up  Take 10 deep breaths in a row every hour that you are awake  Remember to follow each deep breath with a cough     Do not smoke or allow others to smoke around you:  Nicotine and other chemicals in cigarettes and cigars can cause lung damage  Ask your healthcare provider for information if you currently smoke and need help to quit  E-cigarettes or smokeless tobacco still contain nicotine  Talk to your healthcare provider before you use these products  Manage CAP at home:   · Breathe warm, moist air  This helps loosen mucus  Loosely place a warm, wet washcloth over your nose and mouth  A room humidifier may also help make the air moist     · Drink liquids as directed  Ask your healthcare provider how much liquid to drink each day and which liquids to drink  Liquids help make mucus thin and easier to get out of your body  · Gently tap your chest   This helps loosen mucus so it is easier to cough  Lie with your head lower than your chest several times a day and tap your chest      · Get plenty of rest   Rest helps your body heal   Prevent CAP:   · Wash your hands often with soap and water  Carry germ-killing hand gel with you  You can use the gel to clean your hands when soap and water are not available  Do not touch your eyes, nose, or mouth unless you have washed your hands first      · Clean surfaces often  Clean doorknobs, countertops, cell phones, and other surfaces that are touched often  · Always cover your mouth when you cough  Cough into a tissue or your shirtsleeve so you do not spread germs from your hands  · Try to avoid people who have a cold or the flu  If you are sick, stay away from others as much as possible  · Ask about vaccines  You may need a vaccine to help prevent pneumonia  Get an influenza (flu) vaccine every year as soon as it becomes available  © 2017 [employer] Information is for End User's use only and may not be sold, redistributed or otherwise used for commercial purposes  All illustrations and images included in CareNotes® are the copyrighted property of [employer]  or [employer]    The above information is an  only  It is not intended as medical advice for individual conditions or treatments  Talk to your doctor, nurse or pharmacist before following any medical regimen to see if it is safe and effective for you

## 2018-09-18 ENCOUNTER — OFFICE VISIT (OUTPATIENT)
Dept: FAMILY MEDICINE CLINIC | Facility: CLINIC | Age: 83
End: 2018-09-18
Payer: COMMERCIAL

## 2018-09-18 VITALS
HEIGHT: 73 IN | SYSTOLIC BLOOD PRESSURE: 150 MMHG | WEIGHT: 197.6 LBS | HEART RATE: 60 BPM | DIASTOLIC BLOOD PRESSURE: 80 MMHG | TEMPERATURE: 95.9 F | RESPIRATION RATE: 16 BRPM | BODY MASS INDEX: 26.19 KG/M2

## 2018-09-18 DIAGNOSIS — J18.9 PNEUMONIA DUE TO INFECTIOUS ORGANISM, UNSPECIFIED LATERALITY, UNSPECIFIED PART OF LUNG: Primary | ICD-10-CM

## 2018-09-18 PROCEDURE — 3008F BODY MASS INDEX DOCD: CPT | Performed by: FAMILY MEDICINE

## 2018-09-18 PROCEDURE — 99214 OFFICE O/P EST MOD 30 MIN: CPT | Performed by: FAMILY MEDICINE

## 2018-09-18 PROCEDURE — 1160F RVW MEDS BY RX/DR IN RCRD: CPT | Performed by: FAMILY MEDICINE

## 2018-09-18 PROCEDURE — 3725F SCREEN DEPRESSION PERFORMED: CPT | Performed by: FAMILY MEDICINE

## 2018-09-18 PROCEDURE — 1036F TOBACCO NON-USER: CPT | Performed by: FAMILY MEDICINE

## 2018-09-19 NOTE — PROGRESS NOTES
FAMILY PRACTICE OFFICE VISIT       NAME: Suzan Alcaraz  AGE: 80 y o  SEX: male       : 1935        MRN: [id number]    DATE: 2018  TIME: 6:30 AM    Assessment and Plan     Problem List Items Addressed This Visit     Pneumonia due to infectious organism - Primary    Relevant Orders    CT chest wo contrast        Patient appears to have recovered well from recent bout of pneumonia  He completed course of antibiotics  He was given a prescription to obtain repeat CT scan of his chest in 3 months for further evaluation and complete resolution of pneumonia as recommended by radiologist    There are no Patient Instructions on file for this visit  Chief Complaint     Chief Complaint   Patient presents with    Follow-up     Patient is here for a ER after visit due to having pneumonia  History of Present Illness     Patient states years recovering well from his recent bout of pneumonia  Unfortunately he is still morning the death of his wife from 3 days ago who passed away from [address] [place] [address]  He denies any significant coughing or fevers at this time  I did review CT scan results from hospitalization  Review of Systems   Review of Systems   Constitutional: Negative  HENT: Negative  Respiratory: Negative  Cardiovascular: Negative  Gastrointestinal: Negative      Psychiatric/Behavioral:        As per HPI       Active Problem List     Patient Active Problem List   Diagnosis    Pneumonia due to infectious organism       Past Medical History:  Past Medical History:   Diagnosis Date    Basal cell carcinoma     Bradycardia     Degenerative joint disease (DJD) of lumbar spine     Hypertension     Kidney stones     Pacemaker        Past Surgical History:  Past Surgical History:   Procedure Laterality Date    CARDIAC PACEMAKER PLACEMENT      CHOLECYSTECTOMY      EYE SURGERY      TONSILLECTOMY         Family History:  Family History   Problem Relation Age of Onset    Diabetes Mother     Heart disease Mother        Social History:  Social History     Social History    Marital status: /Civil Union     Spouse name: N/A    Number of children: N/A    Years of education: N/A     Occupational History    Retired      Social History Main Topics    Smoking status: Never Smoker    Smokeless tobacco: Never Used    Alcohol use Yes      Comment: occasionally; Per All[employer]  No Alcohol use    Drug use: No    Sexual activity: Not on file     Other Topics Concern    Not on file     Social History Narrative    No narrative on file       Objective     Vitals:    09/18/18 1146   BP: 150/80   Pulse: 60   Resp: 16   Temp: (!) 95 9 °F (35 5 °C)     Wt Readings from Last 3 Encounters:   09/18/18 89 6 kg (197 lb 9 6 oz)   09/10/18 92 7 kg (204 lb 5 9 oz)   06/14/18 89 8 kg (198 lb)       Physical Exam   Constitutional: No distress  Neck:   No carotid bruit   Cardiovascular:   Regular rate and rhythm with no murmurs   Pulmonary/Chest:   Lungs are clear to auscultation without wheezes,rales, or rhonchi   Musculoskeletal: He exhibits no edema  Lymphadenopathy:     He has no cervical adenopathy  Skin: No rash noted         Pertinent Laboratory/Diagnostic Studies:  Lab Results   Component Value Date    GLUCOSE 114 04/08/2015    BUN 16 09/10/2018    CREATININE 0 76 09/10/2018    CALCIUM 8 6 09/10/2018     09/10/2018    K 3 5 09/10/2018    CO2 27 09/10/2018     09/10/2018     Lab Results   Component Value Date    ALT 44 09/10/2018    AST 31 09/10/2018    ALKPHOS 69 09/10/2018    BILITOT 0 78 04/07/2015       Lab Results   Component Value Date    WBC 12 13 (H) 09/10/2018    HGB 11 8 (L) 09/10/2018    HCT 35 1 (L) 09/10/2018     (H) 09/10/2018     09/10/2018       No results found for: TSH    Lab Results   Component Value Date    CHOL 132 03/24/2014     Lab Results   Component Value Date    TRIG 57 06/18/2018     Lab Results   Component Value Date    HDL 53 06/18/2018 Lab Results   Component Value Date    LDLCALC 78 06/18/2018     No results found for: HGBA1C    Results for orders placed or performed during the hospital encounter of 09/10/18   Comprehensive metabolic panel   Result Value Ref Range    Sodium 138 136 - 145 mmol/L    Potassium 3 5 3 5 - 5 3 mmol/L    Chloride 103 100 - 108 mmol/L    CO2 27 21 - 32 mmol/L    ANION GAP 8 4 - 13 mmol/L    BUN 16 5 - 25 mg/dL    Creatinine 0 76 0 60 - 1 30 mg/dL    Glucose 119 65 - 140 mg/dL    Calcium 8 6 8 3 - 10 1 mg/dL    AST 31 5 - 45 U/L    ALT 44 12 - 78 U/L    Alkaline Phosphatase 69 46 - 116 U/L    Total Protein 6 8 6 4 - 8 2 g/dL    Albumin 3 1 (L) 3 5 - 5 0 g/dL    Total Bilirubin 1 20 (H) 0 20 - 1 00 mg/dL    eGFR 84 ml/min/1 73sq m   CBC and differential   Result Value Ref Range    WBC 12 13 (H) 4 31 - 10 16 Thousand/uL    RBC 3 51 (L) 3 88 - 5 62 Million/uL    Hemoglobin 11 8 (L) 12 0 - 17 0 g/dL    Hematocrit 35 1 (L) 36 5 - 49 3 %     (H) 82 - 98 fL    MCH 33 6 26 8 - 34 3 pg    MCHC 33 6 31 4 - 37 4 g/dL    RDW 12 8 11 6 - 15 1 %    MPV 11 3 8 9 - 12 7 fL    Platelets 842 762 - 014 Thousands/uL    nRBC 0 /100 WBCs    Neutrophils Relative 80 (H) 43 - 75 %    Immat GRANS % 1 0 - 2 %    Lymphocytes Relative 9 (L) 14 - 44 %    Monocytes Relative 9 4 - 12 %    Eosinophils Relative 1 0 - 6 %    Basophils Relative 0 0 - 1 %    Neutrophils Absolute 9 74 (H) 1 85 - 7 62 Thousands/µL    Immature Grans Absolute 0 07 0 00 - 0 20 Thousand/uL    Lymphocytes Absolute 1 03 0 60 - 4 47 Thousands/µL    Monocytes Absolute 1 10 0 17 - 1 22 Thousand/µL    Eosinophils Absolute 0 16 0 00 - 0 61 Thousand/µL    Basophils Absolute 0 03 0 00 - 0 10 Thousands/µL   Troponin I   Result Value Ref Range    Troponin I <0 02 <=0 04 ng/mL   Lactic acid, plasma   Result Value Ref Range    LACTIC ACID 0 8 0 5 - 2 0 mmol/L   Protime-INR   Result Value Ref Range    Protime 14 2 11 8 - 14 2 seconds    INR 1 13 0 86 - 1 17   APTT   Result Value Ref Range    PTT 41 (H) 24 - 36 seconds   ECG 12 lead   Result Value Ref Range    Ventricular Rate 63 BPM    Atrial Rate 63 BPM    TX Interval 344 ms    QRSD Interval 104 ms    QT Interval 402 ms    QTC Interval 411 ms    P Fajardo 79 degrees    QRS Axis 33 degrees    T Wave Axis 67 degrees       Orders Placed This Encounter   Procedures    CT chest wo contrast       ALLERGIES:  No Known Allergies    Current Medications     Current Outpatient Prescriptions   Medication Sig Dispense Refill    amLODIPine (NORVASC) 5 mg tablet TAKE 1 TABLET TWICE DAILY 60 tablet 10    B Complex Vitamins (VITAMIN B COMPLEX PO) Take 1 tablet by mouth daily      hydrochlorothiazide (MICROZIDE) 12 5 mg capsule Take 1 capsule (12 5 mg total) by mouth daily 30 capsule 5    metoprolol tartrate (LOPRESSOR) 25 mg tablet Take 0 5 tablets (12 5 mg total) by mouth every 12 (twelve) hours 30 tablet 11    Multiple Vitamins-Minerals (MULTIVITAMIN ADULT PO) Take 1 tablet by mouth daily      potassium chloride (K-DUR,KLOR-CON) 20 mEq tablet TAKE 2 TABLETS DAILY AS DIRECTED  60 tablet 3     No current facility-administered medications for this visit            Health Maintenance     Health Maintenance   Topic Date Due    SLP PLAN OF CARE  1935    INFLUENZA VACCINE  09/01/2018    Fall Risk  09/18/2019    Depression Screening PHQ  09/18/2019    DTaP,Tdap,and Td Vaccines (2 - Td) 04/16/2025    Pneumococcal PPSV23/PCV13 65+ Years / High and Highest Risk  Completed     Immunization History   Administered Date(s) Administered    Influenza 09/20/2014, 10/14/2016, 10/16/2017, 08/19/2018    Influenza Split High Dose Preservative Free IM 09/20/2014, 10/14/2016, 10/16/2017    Influenza TIV (IM) 11/04/2013    Pneumococcal Conjugate 13-Valent 04/16/2015    Pneumococcal Polysaccharide PPV23 10/01/2009    Td (adult), adsorbed 08/01/2010    Tdap 27/99/8089       Shelly Graham MD

## 2018-11-01 ENCOUNTER — REMOTE DEVICE CLINIC VISIT (OUTPATIENT)
Dept: CARDIOLOGY CLINIC | Facility: CLINIC | Age: 83
End: 2018-11-01
Payer: COMMERCIAL

## 2018-11-01 DIAGNOSIS — Z95.0 CARDIAC PACEMAKER IN SITU: ICD-10-CM

## 2018-11-01 DIAGNOSIS — I44.2 AV BLOCK, COMPLETE (HCC): Primary | ICD-10-CM

## 2018-11-01 PROCEDURE — 93296 REM INTERROG EVL PM/IDS: CPT | Performed by: INTERNAL MEDICINE

## 2018-11-01 PROCEDURE — 93294 REM INTERROG EVL PM/LDLS PM: CPT | Performed by: INTERNAL MEDICINE

## 2018-11-01 NOTE — PROGRESS NOTES
Results for orders placed or performed in visit on 11/01/18   Cardiac EP device report    Narrative    BSC-PM-DUAL  LATITUDE TRANSMISSION: BATTERY VOLTAGE ADEQUATE (5 5 YRS)  AP-31%, -44% (>40% [email or web address])  ALL AVAILABLE LEAD PARAMETERS WITHIN NORMAL LIMITS  4 ATR EPISODES IN SEPT MAX DURATION 13 8 MINS- AFLUTTER ON AVAILABLE EGM'S  AT/AF BURDEN<1%  PT ON METOPROLOL & NOT ON ANY ASA OR ANTICOAGULATION  NOTIFIED DR KATZ  NORMAL DEVICE FUNCTION   GV

## 2018-11-08 ENCOUNTER — TELEPHONE (OUTPATIENT)
Dept: CARDIOLOGY CLINIC | Facility: CLINIC | Age: 83
End: 2018-11-08

## 2018-11-08 DIAGNOSIS — I48.92 ATRIAL FLUTTER, UNSPECIFIED TYPE (HCC): Primary | ICD-10-CM

## 2018-11-08 NOTE — TELEPHONE ENCOUNTER
Spoke to pt, per jgg pm ck shows possible atrial flutter, order holter and advised pt take asa 81mg qd

## 2018-11-08 NOTE — TELEPHONE ENCOUNTER
----- Message from Cindy Harper MD sent at 11/1/2018 12:09 PM EDT -----  Patient's pacer checks suggested the possibility of atrial flutter  Please order a 24 hour Holter monitor  In the meantime asked patient to take aspirin 81 mg per day  Thank you

## 2018-11-12 RX ORDER — ASPIRIN 81 MG/1
81 TABLET ORAL DAILY
Qty: 30 TABLET | Refills: 0 | Status: SHIPPED | OUTPATIENT
Start: 2018-11-12 | End: 2019-05-29

## 2018-11-13 ENCOUNTER — HOSPITAL ENCOUNTER (OUTPATIENT)
Dept: NON INVASIVE DIAGNOSTICS | Facility: CLINIC | Age: 83
Discharge: HOME | End: 2018-11-13
Payer: COMMERCIAL

## 2018-11-13 DIAGNOSIS — I48.92 ATRIAL FLUTTER, UNSPECIFIED TYPE (HCC): ICD-10-CM

## 2018-11-13 PROCEDURE — 93225 XTRNL ECG REC<48 HRS REC: CPT

## 2018-11-13 PROCEDURE — 93226 XTRNL ECG REC<48 HR SCAN A/R: CPT

## 2018-11-18 PROCEDURE — 93227 XTRNL ECG REC<48 HR R&I: CPT | Performed by: INTERNAL MEDICINE

## 2018-12-11 NOTE — PROGRESS NOTES
Cardiology Follow Up    Reina Lilly  1935  [id number]  [facility] [phone]    1  Cardiac pacemaker in situ     2  Essential (primary) hypertension     3  LVH (left ventricular hypertrophy)     4  Atrial flutter, unspecified type ([address] )         Interval History:  Patient is here for a follow-up visit  He was last seen by me in June  He has a [employer] dual-chamber pacemaker  He had a Holter monitor done in November which demonstrated sinus rhythm with a background electronic pacemaker noted  He had no significant ventricular or premature supraventricular ectopy  This was done because a pacer check in November suggested the possibility of atrial flutter  There apparently were four episodes with a maximal duration being about 14 min  His most recent echocardiogram was done September 2016 and demonstrated preserved LV systolic function with mild LVH and no significant valvular heart disease  Will hold off on anticoagulation and continue aspirin therapy as the patient uses a cane to ambulate and has had falls most recently about a week ago when he impacted on his right hip  Since I have seen him his wife passed away from T-cell lymphoma and I did pass long my sympathy ease  He has otherwise been stable from a cardiac point of view      Patient Active Problem List   Diagnosis    Pneumonia due to infectious organism     Past Medical History:   Diagnosis Date    Basal cell carcinoma     Bradycardia     Degenerative joint disease (DJD) of lumbar spine     Hypertension     Kidney stones     Pacemaker      Social History     Social History    Marital status: /Civil Union     Spouse name: N/A    Number of children: N/A    Years of education: N/A     Occupational History    Retired      Social History Main Topics    Smoking status: Never Smoker    Smokeless tobacco: Never Used    Alcohol use Yes      Comment: occasionally; Per [employer]  No Alcohol use    Drug use: No    Sexual activity: Not on file     Other Topics Concern    Not on file     Social History Narrative    No narrative on file      Family History   Problem Relation Age of Onset    Diabetes Mother     Heart disease Mother      Past Surgical History:   Procedure Laterality Date    CARDIAC PACEMAKER PLACEMENT      CHOLECYSTECTOMY      EYE SURGERY      TONSILLECTOMY         Current Outpatient Prescriptions:     amLODIPine (NORVASC) 5 mg tablet, TAKE 1 TABLET TWICE DAILY, Disp: 60 tablet, Rfl: 10    aspirin (ECOTRIN LOW STRENGTH) 81 mg EC tablet, Take 1 tablet (81 mg total) by mouth daily, Disp: 30 tablet, Rfl: 0    B Complex Vitamins (VITAMIN B COMPLEX PO), Take 1 tablet by mouth daily, Disp: , Rfl:     hydrochlorothiazide (MICROZIDE) 12 5 mg capsule, Take 1 capsule (12 5 mg total) by mouth daily, Disp: 30 capsule, Rfl: 5    metoprolol tartrate (LOPRESSOR) 25 mg tablet, Take 0 5 tablets (12 5 mg total) by mouth every 12 (twelve) hours, Disp: 30 tablet, Rfl: 11    Multiple Vitamins-Minerals (MULTIVITAMIN ADULT PO), Take 1 tablet by mouth daily, Disp: , Rfl:     potassium chloride (K-DUR,KLOR-CON) 20 mEq tablet, TAKE 2 TABLETS DAILY AS DIRECTED , Disp: 60 tablet, Rfl: 3  No Known Allergies    Labs:not applicable  Imaging: No results found  Review of Systems:  Review of Systems   All other systems reviewed and are negative  Physical Exam:  /76 (BP Location: Right arm, Patient Position: Sitting, Cuff Size: Standard)   Pulse (!) 54   Ht 6' 1" (1 854 m)   Wt 92 8 kg (204 lb 8 oz)   BMI 26 98 kg/m²   Physical Exam   Constitutional: He is oriented to person, place, and time  He appears well-developed and well-nourished  HENT:   Head: Normocephalic and atraumatic  Eyes: Pupils are equal, round, and reactive to light  Conjunctivae are normal    Neck: Normal range of motion  Neck supple     Cardiovascular: Normal rate and normal heart sounds  Pulmonary/Chest: Effort normal and breath sounds normal    Neurological: He is alert and oriented to person, place, and time  Skin: Skin is warm and dry  Psychiatric: He has a normal mood and affect  Vitals reviewed  Discussion/Summary:I will continue the patient's present medical regimen  The patient appears well compensated  I have asked the patient to call if there is a problem in the interim otherwise I will see the patient in six months time

## 2018-12-13 ENCOUNTER — OFFICE VISIT (OUTPATIENT)
Dept: CARDIOLOGY CLINIC | Facility: CLINIC | Age: 83
End: 2018-12-13
Payer: COMMERCIAL

## 2018-12-13 VITALS
DIASTOLIC BLOOD PRESSURE: 76 MMHG | SYSTOLIC BLOOD PRESSURE: 140 MMHG | WEIGHT: 204.5 LBS | BODY MASS INDEX: 27.1 KG/M2 | HEART RATE: 54 BPM | HEIGHT: 73 IN

## 2018-12-13 DIAGNOSIS — I48.92 ATRIAL FLUTTER, UNSPECIFIED TYPE (HCC): ICD-10-CM

## 2018-12-13 DIAGNOSIS — I10 ESSENTIAL (PRIMARY) HYPERTENSION: ICD-10-CM

## 2018-12-13 DIAGNOSIS — Z95.0 CARDIAC PACEMAKER IN SITU: Primary | ICD-10-CM

## 2018-12-13 DIAGNOSIS — I51.7 LVH (LEFT VENTRICULAR HYPERTROPHY): ICD-10-CM

## 2018-12-13 PROCEDURE — 4040F PNEUMOC VAC/ADMIN/RCVD: CPT | Performed by: INTERNAL MEDICINE

## 2018-12-13 PROCEDURE — 99214 OFFICE O/P EST MOD 30 MIN: CPT | Performed by: INTERNAL MEDICINE

## 2019-02-04 ENCOUNTER — IN-CLINIC DEVICE VISIT (OUTPATIENT)
Dept: CARDIOLOGY CLINIC | Facility: CLINIC | Age: 84
End: 2019-02-04
Payer: COMMERCIAL

## 2019-02-04 DIAGNOSIS — Z95.0 CARDIAC PACEMAKER IN SITU: ICD-10-CM

## 2019-02-04 DIAGNOSIS — Z45.018 ADJUSTMENT AND MANAGEMENT OF CARDIAC PACEMAKER: ICD-10-CM

## 2019-02-04 DIAGNOSIS — I44.2 AV BLOCK, COMPLETE (HCC): Primary | ICD-10-CM

## 2019-02-04 PROCEDURE — 93280 PM DEVICE PROGR EVAL DUAL: CPT | Performed by: INTERNAL MEDICINE

## 2019-02-04 NOTE — PROGRESS NOTES
Results for orders placed or performed in visit on 02/04/19   Cardiac EP device report    Narrative    BSC-PM-DUAL  DEVICE INTERROGATED IN THE [facility] OFFICE  BATTERY VOLTAGE ADEQUATE (5 5 YRS)  AP-32%, -44% (>40% [email or web address])  ALL LEAD PARAMETERS WITHIN NORMAL LIMITS  NO NEW SIGNIFICANT HIGH RATE EPISODES  NORMAL DEVICE FUNCTION   GV

## 2019-02-06 DIAGNOSIS — I10 ESSENTIAL (PRIMARY) HYPERTENSION: ICD-10-CM

## 2019-02-06 RX ORDER — POTASSIUM CHLORIDE 20 MEQ/1
TABLET, EXTENDED RELEASE ORAL
Qty: 60 TABLET | Refills: 0 | Status: SHIPPED | OUTPATIENT
Start: 2019-02-06 | End: 2019-04-05 | Stop reason: SDUPTHER

## 2019-04-05 DIAGNOSIS — I10 ESSENTIAL (PRIMARY) HYPERTENSION: ICD-10-CM

## 2019-04-05 RX ORDER — POTASSIUM CHLORIDE 20 MEQ/1
TABLET, EXTENDED RELEASE ORAL
Qty: 60 TABLET | Refills: 0 | Status: SHIPPED | OUTPATIENT
Start: 2019-04-05 | End: 2019-05-06 | Stop reason: SDUPTHER

## 2019-05-06 ENCOUNTER — REMOTE DEVICE CLINIC VISIT (OUTPATIENT)
Dept: CARDIOLOGY CLINIC | Facility: CLINIC | Age: 84
End: 2019-05-06
Payer: COMMERCIAL

## 2019-05-06 DIAGNOSIS — Z95.0 PRESENCE OF CARDIAC PACEMAKER: ICD-10-CM

## 2019-05-06 DIAGNOSIS — I47.1 SVT (SUPRAVENTRICULAR TACHYCARDIA) (HCC): ICD-10-CM

## 2019-05-06 DIAGNOSIS — I10 ESSENTIAL (PRIMARY) HYPERTENSION: ICD-10-CM

## 2019-05-06 DIAGNOSIS — I44.30 AVB (ATRIOVENTRICULAR BLOCK): Primary | ICD-10-CM

## 2019-05-06 PROCEDURE — 93294 REM INTERROG EVL PM/LDLS PM: CPT | Performed by: INTERNAL MEDICINE

## 2019-05-06 PROCEDURE — 93296 REM INTERROG EVL PM/IDS: CPT | Performed by: INTERNAL MEDICINE

## 2019-05-06 RX ORDER — POTASSIUM CHLORIDE 20 MEQ/1
TABLET, EXTENDED RELEASE ORAL
Qty: 60 TABLET | Refills: 0 | Status: SHIPPED | OUTPATIENT
Start: 2019-05-06 | End: 2019-06-05 | Stop reason: SDUPTHER

## 2019-05-29 ENCOUNTER — OFFICE VISIT (OUTPATIENT)
Dept: FAMILY MEDICINE CLINIC | Facility: CLINIC | Age: 84
End: 2019-05-29
Payer: COMMERCIAL

## 2019-05-29 VITALS
BODY MASS INDEX: 27.89 KG/M2 | HEART RATE: 53 BPM | TEMPERATURE: 96.5 F | DIASTOLIC BLOOD PRESSURE: 70 MMHG | SYSTOLIC BLOOD PRESSURE: 150 MMHG | WEIGHT: 210.4 LBS | OXYGEN SATURATION: 98 % | RESPIRATION RATE: 16 BRPM | HEIGHT: 73 IN

## 2019-05-29 DIAGNOSIS — H92.02 OTALGIA OF LEFT EAR: Primary | ICD-10-CM

## 2019-05-29 PROCEDURE — 99213 OFFICE O/P EST LOW 20 MIN: CPT | Performed by: NURSE PRACTITIONER

## 2019-05-30 ENCOUNTER — TELEPHONE (OUTPATIENT)
Dept: FAMILY MEDICINE CLINIC | Facility: CLINIC | Age: 84
End: 2019-05-30

## 2019-06-05 DIAGNOSIS — I10 ESSENTIAL (PRIMARY) HYPERTENSION: ICD-10-CM

## 2019-06-05 RX ORDER — POTASSIUM CHLORIDE 20 MEQ/1
TABLET, EXTENDED RELEASE ORAL
Qty: 60 TABLET | Refills: 0 | Status: SHIPPED | OUTPATIENT
Start: 2019-06-05 | End: 2019-07-05 | Stop reason: SDUPTHER

## 2019-06-06 ENCOUNTER — OFFICE VISIT (OUTPATIENT)
Dept: CARDIOLOGY CLINIC | Facility: CLINIC | Age: 84
End: 2019-06-06
Payer: COMMERCIAL

## 2019-06-06 VITALS
SYSTOLIC BLOOD PRESSURE: 120 MMHG | BODY MASS INDEX: 27.57 KG/M2 | HEART RATE: 49 BPM | WEIGHT: 208 LBS | HEIGHT: 73 IN | DIASTOLIC BLOOD PRESSURE: 60 MMHG

## 2019-06-06 DIAGNOSIS — Z95.0 PRESENCE OF CARDIAC PACEMAKER: ICD-10-CM

## 2019-06-06 DIAGNOSIS — I48.92 ATRIAL FLUTTER, UNSPECIFIED TYPE (HCC): ICD-10-CM

## 2019-06-06 DIAGNOSIS — I10 ESSENTIAL (PRIMARY) HYPERTENSION: Primary | ICD-10-CM

## 2019-06-06 PROCEDURE — 99214 OFFICE O/P EST MOD 30 MIN: CPT | Performed by: INTERNAL MEDICINE

## 2019-06-07 ENCOUNTER — OFFICE VISIT (OUTPATIENT)
Dept: FAMILY MEDICINE CLINIC | Facility: CLINIC | Age: 84
End: 2019-06-07
Payer: COMMERCIAL

## 2019-06-07 VITALS
SYSTOLIC BLOOD PRESSURE: 118 MMHG | OXYGEN SATURATION: 96 % | HEART RATE: 55 BPM | DIASTOLIC BLOOD PRESSURE: 60 MMHG | BODY MASS INDEX: 27.62 KG/M2 | TEMPERATURE: 97.6 F | HEIGHT: 73 IN | RESPIRATION RATE: 16 BRPM | WEIGHT: 208.38 LBS

## 2019-06-07 DIAGNOSIS — F34.1 DYSTHYMIC DISORDER: ICD-10-CM

## 2019-06-07 DIAGNOSIS — F42.8 OTHER OBSESSIVE-COMPULSIVE DISORDERS: Primary | ICD-10-CM

## 2019-06-07 PROBLEM — F42.9 OBSESSIVE COMPULSIVE DISORDER: Status: ACTIVE | Noted: 2019-06-07

## 2019-06-07 PROBLEM — J18.9 PNEUMONIA DUE TO INFECTIOUS ORGANISM: Status: RESOLVED | Noted: 2018-09-18 | Resolved: 2019-06-07

## 2019-06-07 PROCEDURE — 1160F RVW MEDS BY RX/DR IN RCRD: CPT | Performed by: FAMILY MEDICINE

## 2019-06-07 PROCEDURE — 99213 OFFICE O/P EST LOW 20 MIN: CPT | Performed by: FAMILY MEDICINE

## 2019-06-07 RX ORDER — OFLOXACIN 3 MG/ML
SOLUTION AURICULAR (OTIC)
Refills: 0 | COMMUNITY
Start: 2019-05-31 | End: 2019-07-09

## 2019-06-07 RX ORDER — DEXAMETHASONE SODIUM PHOSPHATE 1 MG/ML
SOLUTION/ DROPS OPHTHALMIC
Refills: 0 | COMMUNITY
Start: 2019-06-05 | End: 2019-07-09

## 2019-07-05 DIAGNOSIS — I10 ESSENTIAL (PRIMARY) HYPERTENSION: ICD-10-CM

## 2019-07-05 RX ORDER — POTASSIUM CHLORIDE 20 MEQ/1
TABLET, EXTENDED RELEASE ORAL
Qty: 60 TABLET | Refills: 0 | Status: SHIPPED | OUTPATIENT
Start: 2019-07-05 | End: 2019-08-05 | Stop reason: SDUPTHER

## 2019-07-09 ENCOUNTER — OFFICE VISIT (OUTPATIENT)
Dept: FAMILY MEDICINE CLINIC | Facility: CLINIC | Age: 84
End: 2019-07-09
Payer: COMMERCIAL

## 2019-07-09 VITALS
SYSTOLIC BLOOD PRESSURE: 118 MMHG | BODY MASS INDEX: 28.12 KG/M2 | OXYGEN SATURATION: 97 % | RESPIRATION RATE: 16 BRPM | HEART RATE: 61 BPM | HEIGHT: 73 IN | WEIGHT: 212.2 LBS | DIASTOLIC BLOOD PRESSURE: 70 MMHG | TEMPERATURE: 96.5 F

## 2019-07-09 DIAGNOSIS — I35.9 AORTIC VALVE DISEASE: ICD-10-CM

## 2019-07-09 DIAGNOSIS — I10 ESSENTIAL HYPERTENSION: Primary | ICD-10-CM

## 2019-07-09 DIAGNOSIS — R26.9 GAIT DISTURBANCE: ICD-10-CM

## 2019-07-09 PROCEDURE — 1160F RVW MEDS BY RX/DR IN RCRD: CPT | Performed by: FAMILY MEDICINE

## 2019-07-09 PROCEDURE — 1036F TOBACCO NON-USER: CPT | Performed by: FAMILY MEDICINE

## 2019-07-09 PROCEDURE — 1170F FXNL STATUS ASSESSED: CPT | Performed by: FAMILY MEDICINE

## 2019-07-09 PROCEDURE — G0439 PPPS, SUBSEQ VISIT: HCPCS | Performed by: FAMILY MEDICINE

## 2019-07-09 PROCEDURE — 3074F SYST BP LT 130 MM HG: CPT | Performed by: FAMILY MEDICINE

## 2019-07-09 PROCEDURE — 1125F AMNT PAIN NOTED PAIN PRSNT: CPT | Performed by: FAMILY MEDICINE

## 2019-07-09 PROCEDURE — 99213 OFFICE O/P EST LOW 20 MIN: CPT | Performed by: FAMILY MEDICINE

## 2019-07-09 RX ORDER — KETOTIFEN FUMARATE 0.35 MG/ML
1 SOLUTION/ DROPS OPHTHALMIC DAILY
COMMUNITY
End: 2020-01-02 | Stop reason: ALTCHOICE

## 2019-07-09 NOTE — PROGRESS NOTES
Assessment and Plan:     Problem List Items Addressed This Visit        Cardiovascular and Mediastinum    Aortic valve disease    Hypertension - Primary     Hypertension  Patient blood pressure is stable at this time he will continue with current regimen of medications  He will obtain blood work as ordered for further evaluation         Relevant Orders    CBC    Comprehensive metabolic panel    TSH, 3rd generation    Lipid panel       Other    Gait disturbance     Gait disturbance  Patient ambulates with a walking cane  He was given an application to obtain a handicap parking placard                History of Present Illness:     Patient presents for Medicare Annual Wellness visit    Patient Care Team:  Varghese Zamarripa MD as PCP - General  MD Luly Miranda MD     Problem List:     Patient Active Problem List   Diagnosis    Aortic valve disease    Cervical radiculopathy    Hypertension    LVH (left ventricular hypertrophy)    Other malignant neoplasm of unspecified site    Spinal stenosis    Obsessive compulsive disorder    Dysthymic disorder    Gait disturbance      Past Medical and Surgical History:     Past Medical History:   Diagnosis Date    Basal cell carcinoma     Bradycardia     Degenerative joint disease (DJD) of lumbar spine     Hypertension     Kidney stones     Pacemaker      Past Surgical History:   Procedure Laterality Date    CARDIAC PACEMAKER PLACEMENT      CHOLECYSTECTOMY      EYE SURGERY      TONSILLECTOMY        Family History:     Family History   Problem Relation Age of Onset    Diabetes Mother     Heart disease Mother       Social History:     Social History     Tobacco Use   Smoking Status Never Smoker   Smokeless Tobacco Never Used     Social History     Substance and Sexual Activity   Alcohol Use Not Currently    Comment: occasionally; Per All[employer]  No Alcohol use     Social History     Substance and Sexual Activity   Drug Use No      Medications and Allergies:     Current Outpatient Medications   Medication Sig Dispense Refill    amLODIPine (NORVASC) 5 mg tablet TAKE 1 TABLET TWICE DAILY 60 tablet 10    B Complex Vitamins (VITAMIN B COMPLEX PO) Take 1 tablet by mouth daily      Cholecalciferol (VITAMIN D PO) Take by mouth      hydrochlorothiazide (MICROZIDE) 12 5 mg capsule Take 1 capsule (12 5 mg total) by mouth daily 30 capsule 5    ketotifen (ALAWAY) 0 025 % ophthalmic solution Administer 1 drop to both eyes daily      metoprolol tartrate (LOPRESSOR) 25 mg tablet TAKE 1/2 TABLET EVERY 12 HOURS 30 tablet 11    Multiple Vitamins-Minerals (MULTIVITAMIN ADULT PO) Take 1 tablet by mouth daily      potassium chloride (K-DUR,KLOR-CON) 20 mEq tablet TAKE 2 TABLETS DAILY AS DIRECTED 60 tablet 0     No current facility-administered medications for this visit  No Known Allergies   Immunizations:     Immunization History   Administered Date(s) Administered    INFLUENZA 09/20/2014, 10/14/2016, 10/16/2017, 08/19/2018    Influenza Split High Dose Preservative Free IM 09/20/2014, 10/14/2016, 10/16/2017    Influenza TIV (IM) 11/04/2013    Pneumococcal Conjugate 13-Valent 04/16/2015    Pneumococcal Polysaccharide PPV23 10/01/2009    Td (adult), adsorbed 08/01/2010    Tdap 04/16/2015      Medicare Screening Tests and Risk Assessments:     Laverne Mcrae is here for his Initial Wellness visit  Health Risk Assessment:  Patient rates overall health as fair  Patient feels that their physical health rating is Same  Eyesight was rated as Same  Hearing was rated as Same  Patient feels that their emotional and mental health rating is Same  Pain experienced by patient in the last 7 days has been None  Emotional/Mental Health:  Patient has not been feeling nervous/anxious  PHQ-9 Depression Screening:    Frequency of the following problems over the past two weeks:      1  Little interest or pleasure in doing things: 0 - not at all      2   Feeling down, depressed, or hopeless: 1 - several days      3  Trouble falling or staying asleep, or sleeping too much: 0 - not at all      4  Feeling tired or having little energy: 1 - several days      5  Poor appetite or overeatin - not at all      6  Feeling bad about yourself - or that you are a failure or have let yourself or your family down: 0 - not at all      7  Trouble concentrating on things, such as reading the newspaper or watching television: 0 - not at all      8  Moving or speaking so slowly that other people could have noticed  Or the opposite - being so fidgety or restless that you have been moving around a lot more than usual: 0 - not at all      9  Thoughts that you would be better off dead, or of hurting yourself in some way: 0 - not at all  PHQ-2 Score: 1  PHQ-9 Score: 2    Broken Bones/Falls: Fall Risk Assessment:    In the past year, patient has experienced: History of falling in past year          Bladder/Bowel:  Patient has leaked urine accidently in the last six months  Patient reports no loss of bowel control  Immunizations:  Patient has had a flu vaccination within the last year  Patient has received a pneumonia shot  Patient has not received a shingles shot  Patient has received tetanus/diphtheria shot  Date of tetanus/diphtheria shot: 2015    Home Safety:  Patient has trouble with stairs inside or outside of their home  Patient currently reports that there are no safety hazards present in home, working smoke alarms, working carbon monoxide detectors  Preventative Screenings:   no prostate cancer screen performed, no colon cancer screen completed, cholesterol screen completed, 2018  glaucoma eye exam completed,     Nutrition:  Current diet: Regular with servings of the following:    Medications:  Patient is currently taking over-the-counter supplements  List of OTC medications includes: MVI, Vitamin B Comlex, D  Patient is able to manage medications      Lifestyle Choices:  Patient reports no tobacco use  Patient has not smoked or used tobacco in the past   Patient reports no alcohol use  Patient drives a vehicle  Patient wears seat belt  Activities of Daily Living:  Can get out of bed by his or her self, able to dress self, able to make own meals, able to do own shopping, able to bathe self, can do own laundry/housekeeping, can manage own money, pay bills and track expenses    Previous Hospitalizations:  No hospitalization or ED visit in past 12 months        Advanced Directives:  Patient has decided on a power of   Patient has spoken to designated power of   Patient has completed advanced directive  Preventative Screening/Counseling:      Cardiovascular:      General: Screening Current          Diabetes:      General: Screening Current          Colorectal Cancer:      General: Screening Not Indicated          Prostate Cancer:      General: Screening Not Indicated          Advanced Directives:   Patient has living will for healthcare,     Immunizations:      Influenza: Risks & Benefits Discussed and Influenza UTD This Year      Pneumococcal: Risks & Benefits Discussed and Lifetime Vaccine Completed      Shingrix: Risks & Benefits Discussed  Additional Comments: Patient states shingles vaccine is covered under his plan however he must go to local pharmacy    He is aware to obtain 2 shot series as discussed

## 2019-07-09 NOTE — PROGRESS NOTES
FAMILY PRACTICE OFFICE VISIT       NAME: Allen Baker  AGE: 80 y o  SEX: male       : 1935        MRN: [id number]    DATE: 2019  TIME: 12:38 PM    Assessment and Plan     Problem List Items Addressed This Visit        Cardiovascular and Mediastinum    Aortic valve disease    Hypertension - Primary     Hypertension  Patient blood pressure is stable at this time he will continue with current regimen of medications  He will obtain blood work as ordered for further evaluation         Relevant Orders    CBC    Comprehensive metabolic panel    TSH, 3rd generation    Lipid panel       Other    Gait disturbance     Gait disturbance  Patient ambulates with a walking cane  He was given an application to obtain a handicap parking placard  Chief Complaint     Chief Complaint   Patient presents with    Medicare Wellness Visit    Follow-up       History of Present Illness     Patient denies any recent illness  He does see a podiatrist for routine foot care on a regular basis  He has not had blood work since 2018  Patient will be obtaining his Shingrix vaccine had [facility]  Patient also requested a handicap placard due to the use of his poor balance and use of a walking cane        Review of Systems   Review of Systems   Constitutional: Negative  HENT:        Chronic hearing loss for which she uses bilateral hearing aids   Respiratory: Negative  Cardiovascular: Negative  Gastrointestinal: Negative  Genitourinary: Negative  Musculoskeletal: Negative  Neurological:        As per HPI   Psychiatric/Behavioral: Negative          Active Problem List     Patient Active Problem List   Diagnosis    Aortic valve disease    Cervical radiculopathy    Hypertension    LVH (left ventricular hypertrophy)    Other malignant neoplasm of unspecified site    Spinal stenosis    Obsessive compulsive disorder    Dysthymic disorder    Gait disturbance       Past Medical History:  Past Medical History:   Diagnosis Date    Basal cell carcinoma     Bradycardia     Degenerative joint disease (DJD) of lumbar spine     Hypertension     Kidney stones     Pacemaker        Past Surgical History:  Past Surgical History:   Procedure Laterality Date    CARDIAC PACEMAKER PLACEMENT      CHOLECYSTECTOMY      EYE SURGERY      TONSILLECTOMY         Family History:  Family History   Problem Relation Age of Onset    Diabetes Mother     Heart disease Mother        Social History:  Social History     Socioeconomic History    Marital status: /Civil Union     Spouse name: Not on file    Number of children: Not on file    Years of education: Not on file    Highest education level: Not on file   Occupational History    Occupation: Retired   Social Needs    Financial resource strain: Not on file    Food insecurity:     Worry: Not on file     Inability: Not on file   [employer] needs:     Medical: Not on file     Non-medical: Not on file   Tobacco Use    Smoking status: Never Smoker    Smokeless tobacco: Never Used   Substance and Sexual Activity    Alcohol use: Not Currently     Comment: occasionally; Per [employer]  No Alcohol use    Drug use: No    Sexual activity: Not on file   Lifestyle    Physical activity:     Days per week: Not on file     Minutes per session: Not on file    Stress: Not on file   Relationships    Social connections:     Talks on phone: Not on file     Gets together: Not on file     Attends Jain service: Not on file     Active member of club or organization: Not on file     Attends meetings of clubs or organizations: Not on file     Relationship status: Not on file    Intimate partner violence:     Fear of current or ex partner: Not on file     Emotionally abused: Not on file     Physically abused: Not on file     Forced sexual activity: Not on file   Other Topics Concern    Not on file   Social History Narrative    Not on file Objective     Vitals:    07/09/19 1207   BP: 118/70   Pulse: 61   Resp: 16   Temp: (!) 96 5 °F (35 8 °C)   SpO2: 97%     Wt Readings from Last 3 Encounters:   07/09/19 96 3 kg (212 lb 3 2 oz)   06/17/19 94 3 kg (208 lb)   06/07/19 94 5 kg (208 lb 6 oz)       Physical Exam   Constitutional: He is oriented to person, place, and time  No distress  HENT:   Bilateral hearing aids   Neck:   No carotid bruit   Cardiovascular: Normal heart sounds  Regular rate and rhythm with no murmurs   Pulmonary/Chest: Breath sounds normal    Lungs are clear to auscultation without wheezes,rales, or rhonchi   Abdominal:   Abdomen is soft, nontender with positive bowel sounds  There is no rebound or guarding  No masses palpated   Musculoskeletal: He exhibits edema  Patient with chronic +1 peripheral edema lower extremities  Patient does ambulate slowly with the use of a walking cane   Lymphadenopathy:     He has no cervical adenopathy  Neurological: He is alert and oriented to person, place, and time  Psychiatric: He has a normal mood and affect   His behavior is normal  Judgment and thought content normal        Pertinent Laboratory/Diagnostic Studies:  Lab Results   Component Value Date    GLUCOSE 114 04/08/2015    BUN 16 09/10/2018    CREATININE 0 76 09/10/2018    CALCIUM 8 6 09/10/2018     04/08/2015    K 3 5 09/10/2018    CO2 27 09/10/2018     09/10/2018     Lab Results   Component Value Date    ALT 44 09/10/2018    AST 31 09/10/2018    ALKPHOS 69 09/10/2018    BILITOT 0 78 04/07/2015       Lab Results   Component Value Date    WBC 12 13 (H) 09/10/2018    HGB 11 8 (L) 09/10/2018    HCT 35 1 (L) 09/10/2018     (H) 09/10/2018     09/10/2018       No results found for: TSH    Lab Results   Component Value Date    CHOL 132 03/24/2014     Lab Results   Component Value Date    TRIG 57 06/18/2018     Lab Results   Component Value Date    HDL 53 06/18/2018     Lab Results   Component Value Date [address] 78 06/18/2018     No results found for: HGBA1C    Results for orders placed or performed during the hospital encounter of 09/10/18   Comprehensive metabolic panel   Result Value Ref Range    Sodium 138 136 - 145 mmol/L    Potassium 3 5 3 5 - 5 3 mmol/L    Chloride 103 100 - 108 mmol/L    CO2 27 21 - 32 mmol/L    ANION GAP 8 4 - 13 mmol/L    BUN 16 5 - 25 mg/dL    Creatinine 0 76 0 60 - 1 30 mg/dL    Glucose 119 65 - 140 mg/dL    Calcium 8 6 8 3 - 10 1 mg/dL    AST 31 5 - 45 U/L    ALT 44 12 - 78 U/L    Alkaline Phosphatase 69 46 - 116 U/L    Total Protein 6 8 6 4 - 8 2 g/dL    Albumin 3 1 (L) 3 5 - 5 0 g/dL    Total Bilirubin 1 20 (H) 0 20 - 1 00 mg/dL    eGFR 84 ml/min/1 73sq m   CBC and differential   Result Value Ref Range    WBC 12 13 (H) 4 31 - 10 16 Thousand/uL    RBC 3 51 (L) 3 88 - 5 62 Million/uL    Hemoglobin 11 8 (L) 12 0 - 17 0 g/dL    Hematocrit 35 1 (L) 36 5 - 49 3 %     (H) 82 - 98 fL    MCH 33 6 26 8 - 34 3 pg    MCHC 33 6 31 4 - 37 4 g/dL    RDW 12 8 11 6 - 15 1 %    MPV 11 3 8 9 - 12 7 fL    Platelets 623 481 - 228 Thousands/uL    nRBC 0 /100 WBCs    Neutrophils Relative 80 (H) 43 - 75 %    Immat GRANS % 1 0 - 2 %    Lymphocytes Relative 9 (L) 14 - 44 %    Monocytes Relative 9 4 - 12 %    Eosinophils Relative 1 0 - 6 %    Basophils Relative 0 0 - 1 %    Neutrophils Absolute 9 74 (H) 1 85 - 7 62 Thousands/µL    Immature Grans Absolute 0 07 0 00 - 0 20 Thousand/uL    Lymphocytes Absolute 1 03 0 60 - 4 47 Thousands/µL    Monocytes Absolute 1 10 0 17 - 1 22 Thousand/µL    Eosinophils Absolute 0 16 0 00 - 0 61 Thousand/µL    Basophils Absolute 0 03 0 00 - 0 10 Thousands/µL   Troponin I   Result Value Ref Range    Troponin I <0 02 <=0 04 ng/mL   Lactic acid, plasma   Result Value Ref Range    LACTIC ACID 0 8 0 5 - 2 0 mmol/L   Protime-INR   Result Value Ref Range    Protime 14 2 11 8 - 14 2 seconds    INR 1 13 0 86 - 1 17   APTT   Result Value Ref Range    PTT 41 (H) 24 - 36 seconds   ECG 12 lead   Result Value Ref Range    Ventricular Rate 63 BPM    Atrial Rate 63 BPM    DE Interval 344 ms    QRSD Interval 104 ms    QT Interval 402 ms    QTC Interval 411 ms    P Wilson 79 degrees    QRS Axis 33 degrees    T Wave Axis 67 degrees       Orders Placed This Encounter   Procedures    CBC    Comprehensive metabolic panel    TSH, 3rd generation    Lipid panel       ALLERGIES:  No Known Allergies    Current Medications     Current Outpatient Medications   Medication Sig Dispense Refill    amLODIPine (NORVASC) 5 mg tablet TAKE 1 TABLET TWICE DAILY 60 tablet 10    B Complex Vitamins (VITAMIN B COMPLEX PO) Take 1 tablet by mouth daily      Cholecalciferol (VITAMIN D PO) Take by mouth      hydrochlorothiazide (MICROZIDE) 12 5 mg capsule Take 1 capsule (12 5 mg total) by mouth daily 30 capsule 5    ketotifen (ALAWAY) 0 025 % ophthalmic solution Administer 1 drop to both eyes daily      metoprolol tartrate (LOPRESSOR) 25 mg tablet TAKE 1/2 TABLET EVERY 12 HOURS 30 tablet 11    Multiple Vitamins-Minerals (MULTIVITAMIN ADULT PO) Take 1 tablet by mouth daily      potassium chloride (K-DUR,KLOR-CON) 20 mEq tablet TAKE 2 TABLETS DAILY AS DIRECTED 60 tablet 0     No current facility-administered medications for this visit            Health Maintenance     Health Maintenance   Topic Date Due    Medicare Annual Wellness Visit (AWV)  1935    SLP PLAN OF CARE  1935    BMI: Followup Plan  05/26/1953    INFLUENZA VACCINE  07/01/2019    Fall Risk  09/18/2019    BMI: Adult  06/17/2020    DTaP,Tdap,and Td Vaccines (2 - Td) 04/16/2025    Pneumococcal Vaccine: 65+ Years  Completed    Pneumococcal Vaccine: Pediatrics (0 to 5 Years) and At-Risk Patients (6 to 59 Years)  Aged Out    HEPATITIS B VACCINES  Aged [employer] History   Administered Date(s) Administered    INFLUENZA 09/20/2014, 10/14/2016, 10/16/2017, 08/19/2018    Influenza Split High Dose Preservative Free IM 09/20/2014, 10/14/2016, 10/16/2017    Influenza TIV (IM) 11/04/2013    Pneumococcal Conjugate 13-Valent 04/16/2015    Pneumococcal Polysaccharide PPV23 10/01/2009    Td (adult), adsorbed 08/01/2010    Tdap 88/12/7909       Marybeth Askew MD

## 2019-07-09 NOTE — ASSESSMENT & PLAN NOTE
Hypertension  Patient blood pressure is stable at this time he will continue with current regimen of medications    He will obtain blood work as ordered for further evaluation

## 2019-07-09 NOTE — ASSESSMENT & PLAN NOTE
Gait disturbance  Patient ambulates with a walking cane  He was given an application to obtain a handicap parking placard

## 2019-07-09 NOTE — PATIENT INSTRUCTIONS
Obesity   AMBULATORY CARE:   Obesity  is when your body mass index (BMI) is greater than 30  Your healthcare provider will use your height and weight to measure your BMI  The risks of obesity include  many health problems, such as injuries or physical disability  You may need tests to check for the following:  · Diabetes     · High blood pressure or high cholesterol     · Heart disease     · Gallbladder or liver disease     · Cancer of the colon, breast, prostate, liver, or kidney     · Sleep apnea     · Arthritis or gout  Seek care immediately if:   · You have a severe headache, confusion, or difficulty speaking  · You have weakness on one side of your body  · You have chest pain, sweating, or shortness of breath  Contact your healthcare provider if:   · You have symptoms of gallbladder or liver disease, such as pain in your upper abdomen  · You have knee or hip pain and discomfort while walking  · You have symptoms of diabetes, such as intense hunger and thirst, and frequent urination  · You have symptoms of sleep apnea, such as snoring or daytime sleepiness  · You have questions or concerns about your condition or care  Treatment for obesity  focuses on helping you lose weight to improve your health  Even a small decrease in BMI can reduce the risk for many health problems  Your healthcare provider will help you set a weight-loss goal   · Lifestyle changes  are the first step in treating obesity  These include making healthy food choices and getting regular physical activity  Your healthcare provider may suggest a weight-loss program that involves coaching, education, and therapy  · Medicine  may help you lose weight when it is used with a healthy diet and physical activity  · Surgery  can help you lose weight if you are very obese and have other health problems  There are several types of weight-loss surgery  Ask your healthcare provider for more information    Be successful losing weight:   · Set small, realistic goals  An example of a small goal is to walk for 20 minutes 5 days a week  Anther goal is to lose 5% of your body weight  · Tell friends, family members, and coworkers about your goals  and ask for their support  Ask a friend to lose weight with you, or join a weight-loss support group  · Identify foods or triggers that may cause you to overeat , and find ways to avoid them  Remove tempting high-calorie foods from your home and workplace  Place a bowl of fresh fruit on your kitchen counter  If stress causes you to eat, then find other ways to cope with stress  · Keep a diary to track what you eat and drink  Also write down how many minutes of physical activity you do each day  Weigh yourself once a week and record it in your diary  Eating changes: You will need to eat 500 to 1,000 fewer calories each day than you currently eat to lose 1 to 2 pounds a week  The following changes will help you cut calories:  · Eat smaller portions  Use small plates, no larger than 9 inches in diameter  Fill your plate half full of fruits and vegetables  Measure your food using measuring cups until you know what a serving size looks like  · Eat 3 meals and 1 or 2 snacks each day  Plan your meals in advance  Alexander Lard and eat at home most of the time  Eat slowly  · Eat fruits and vegetables at every meal   They are low in calories and high in fiber, which makes you feel full  Do not add butter, margarine, or cream sauce to vegetables  Use herbs to season steamed vegetables  · Eat less fat and fewer fried foods  Eat more baked or grilled chicken and fish  These protein sources are lower in calories and fat than red meat  Limit fast food  Dress your salads with olive oil and vinegar instead of bottled dressing  · Limit the amount of sugar you eat  Do not drink sugary beverages  Limit alcohol  Activity changes:  Physical activity is good for your body in many ways   It helps you burn calories and build strong muscles  It decreases stress and depression, and improves your mood  It can also help you sleep better  Talk to your healthcare provider before you begin an exercise program   · Exercise for at least 30 minutes 5 days a week  Start slowly  Set aside time each day for physical activity that you enjoy and that is convenient for you  It is best to do both weight training and an activity that increases your heart rate, such as walking, bicycling, or swimming  · Find ways to be more active  Do yard work and housecleaning  Walk up the stairs instead of using elevators  Spend your leisure time going to events that require walking, such as outdoor festivals or fairs  This extra physical activity can help you lose weight and keep it off  Follow up with your healthcare provider as directed: You may need to meet with a dietitian  Write down your questions so you remember to ask them during your visits  © 2017 [employer] Information is for End User's use only and may not be sold, redistributed or otherwise used for commercial purposes  All illustrations and images included in CareNotes® are the copyrighted property of [employer]  or Dakota Loaiza  The above information is an  only  It is not intended as medical advice for individual conditions or treatments  Talk to your doctor, nurse or pharmacist before following any medical regimen to see if it is safe and effective for you  Urinary Incontinence   WHAT YOU NEED TO KNOW:   What is urinary incontinence? Urinary incontinence (UI) is when you lose control of your bladder  What causes UI? UI occurs because your bladder cannot store or empty urine properly  The following are the most common types of UI:  · Stress incontinence  is when you leak urine due to increased bladder pressure  This may happen when you cough, sneeze, or exercise       · Urge incontinence  is when you feel the need to urinate right away and leak urine accidentally  · Mixed incontinence  is when you have both stress and urge UI  What are the signs and symptoms of UI?   · You feel like your bladder does not empty completely when you urinate  · You urinate often and need to urinate immediately  · You leak urine when you sleep, or you wake up with the urge to urinate  · You leak urine when you cough, sneeze, exercise, or laugh  How is UI diagnosed? Your healthcare provider will ask how often you leak urine and whether you have stress or urge symptoms  Tell him which medicines you take, how often you urinate, and how much liquid you drink each day  You may need any of the following tests:  · Urine tests  may show infection or kidney function  · A pelvic exam  may be done to check for blockages  A pelvic exam will also show if your bladder, uterus, or other organs have moved out of place  · An x-ray, ultrasound, or CT  may show problems with parts of your urinary system  You may be given contrast liquid to help your organs show up better in the pictures  Tell the healthcare provider if you have ever had an allergic reaction to contrast liquid  Do not enter the MRI room with anything metal  Metal can cause serious injury  Tell the healthcare provider if you have any metal in or on your body  · A bladder scan  will show how much urine is left in your bladder after you urinate  You will be asked to urinate and then healthcare providers will use a small ultrasound machine to check the urine left in your bladder  · Cystometry  is used to check the function of your urinary system  Your healthcare provider checks the pressure in your bladder while filling it with fluid  Your bladder pressure may also be tested when your bladder is full and while you urinate  How is UI treated? · Medicines  can help strengthen your bladder control      · Electrical stimulation  is used to send a small amount of electrical energy to your pelvic floor muscles  This helps control your bladder function  Electrodes may be placed outside your body or in your rectum  For women, the electrodes may be placed in the vagina  · A bulking agent  may be injected into the wall of your urethra to make it thicker  This helps keep your urethra closed and decreases urine leakage  · Devices  such as a clamp, pessary, or tampon may help stop urine leaks  Ask your healthcare provider for more information about these and other devices  · Surgery  may be needed if other treatments do not work  Several types of surgery can help improve your bladder control  Ask your healthcare provider for more information about the surgery you may need  How can I manage my symptoms? · Do pelvic muscle exercises often  Your pelvic muscles help you stop urinating  Squeeze these muscles tight for 5 seconds, then relax for 5 seconds  Gradually work up to squeezing for 10 seconds  Do 3 sets of 15 repetitions a day, or as directed  This will help strengthen your pelvic muscles and improve bladder control  · A catheter  may be used to help empty your bladder  A catheter is a tiny, plastic tube that is put into your bladder to drain your urine  Your healthcare provider may tell you to use a catheter to prevent your bladder from getting too full and leaking urine  · Keep a UI record  Write down how often you leak urine and how much you leak  Make a note of what you were doing when you leaked urine  · Train your bladder  Go to the bathroom at set times, such as every 2 hours, even if you do not feel the urge to go  You can also try to hold your urine when you feel the urge to go  For example, hold your urine for 5 minutes when you feel the urge to go  As that becomes easier, hold your urine for 10 minutes  · Drink liquids as directed  Ask your healthcare provider how much liquid to drink each day and which liquids are best for you   You may need to limit the amount of liquid you drink to help control your urine leakage  Limit or do not have drinks that contain caffeine or alcohol  Do not drink any liquid right before you go to bed  · Prevent constipation  Eat a variety of high-fiber foods  Good examples are high-fiber cereals, beans, vegetables, and whole-grain breads  Prune juice may help make your bowel movement softer  Walking is the best way to trigger your intestines to have a bowel movement  · Exercise regularly and maintain a healthy weight  Ask your healthcare provider how much you should weigh and about the best exercise plan for you  Weight loss and exercise will decrease pressure on your bladder and help you control your leakage  Ask him to help you create a weight loss plan if you are overweight  When should I seek immediate care? · You have severe pain  · You are confused or cannot think clearly  When should I contact my healthcare provider? · You have a fever  · You see blood in your urine  · You have pain when you urinate  · You have new or worse pain, even after treatment  · Your mouth feels dry or you have vision changes  · Your urine is cloudy or smells bad  · You have questions or concerns about your condition or care  CARE AGREEMENT:   You have the right to help plan your care  Learn about your health condition and how it may be treated  Discuss treatment options with your caregivers to decide what care you want to receive  You always have the right to refuse treatment  The above information is an  only  It is not intended as medical advice for individual conditions or treatments  Talk to your doctor, nurse or pharmacist before following any medical regimen to see if it is safe and effective for you  © 2017 [employer] Information is for End User's use only and may not be sold, redistributed or otherwise used for commercial purposes   All illustrations and images included in CareNotes® are the copyrighted property of [employer]  or Dakota Loaiza  Cigarette Smoking and Your Health   AMBULATORY CARE:   Risks to your health if you smoke:  Nicotine and other chemicals found in tobacco damage every cell in your body  Even if you are a light smoker, you have an increased risk for cancer, heart disease, and lung disease  If you are pregnant or have diabetes, smoking increases your risk for complications  Benefits to your health if you stop smoking:   · You decrease respiratory symptoms such as coughing, wheezing, and shortness of breath  · You reduce your risk for cancers of the lung, mouth, throat, kidney, bladder, pancreas, stomach, and cervix  If you already have cancer, you increase the benefits of chemotherapy  You also reduce your risk for cancer returning or a second cancer from developing  · You reduce your risk for heart disease, blood clots, heart attack, and stroke  · You reduce your risk for lung infections, and diseases such as pneumonia, asthma, chronic bronchitis, and emphysema  · Your circulation improves  More oxygen can be delivered to your body  If you have diabetes, you lower your risk for complications, such as kidney, artery, and eye diseases  You also lower your risk for nerve damage  Nerve damage can lead to amputations, poor vision, and blindness  · You improve your body's ability to heal and to fight infections  Benefits to the health of others if you stop smoking:  Tobacco is harmful to nonsmokers who breathe in your secondhand smoke  The following are ways the health of others around you may improve when you stop smoking:  · You lower the risks for lung cancer and heart disease in nonsmoking adults  · If you are pregnant, you lower the risk for miscarriage, early delivery, low birth weight, and stillbirth  You also lower your baby's risk for SIDS, obesity, developmental delay, and neurobehavioral problems, such as ADHD  · If you have children, you lower their risk for ear infections, colds, pneumonia, bronchitis, and asthma  For more information and support to stop smoking:   · Smokefree  gov  Phone: 0- 103 - 152-4493  Web Address: www smokefrInternational [employer]  Follow up with your healthcare provider as directed:  Write down your questions so you remember to ask them during your visits  © 2017 [address] [employer] Information is for End User's use only and may not be sold, redistributed or otherwise used for commercial purposes  All illustrations and images included in CareNotes® are the copyrighted property of [employer]  or Dakota Loaiza  The above information is an  only  It is not intended as medical advice for individual conditions or treatments  Talk to your doctor, nurse or pharmacist before following any medical regimen to see if it is safe and effective for you  Fall Prevention   WHAT YOU NEED TO KNOW:   What is fall prevention? Fall prevention includes ways to make your home and other areas safer  It also includes ways you can move more carefully to prevent a fall  What increases my risk for falls? · Lack of vitamin D    · Not getting enough sleep each night    · Trouble walking or keeping your balance, or foot problems    · Health conditions that cause changes in your blood pressure, vision, or muscle strength and coordination    · Medicines that make you dizzy, weak, or sleepy    · Problems seeing clearly    · Shoes that have high heels or are not supportive    · Tripping hazards, such as items left on the floor, no handrails on the stairs, or broken steps  How can I help protect myself from falls? · Stand or sit up slowly  This may help you keep your balance and prevent falls  If you need to get up during the night, sit up first  Be sure you are fully awake before you stand  Turn on the light before you start walking  Go slowly in case you are still sleepy   Make sure you will not trip over any pets sleeping in the bedroom  · Use assistive devices as directed  Your healthcare provider may suggest that you use a cane or walker to help you keep your balance  You may need to have grab bars put in your bathroom near the toilet or in the shower  · Wear shoes that fit well and have soles that   Wear shoes both inside and outside  Use slippers with good   Do not wear shoes with high heels  · Wear a personal alarm  This is a device that allows you to call 911 if you fall and need help  Ask your healthcare provider for more information  · Stay active  Exercise can help strengthen your muscles and improve your balance  Your healthcare provider may recommend water aerobics or walking  He or she may also recommend physical therapy to improve your coordination  Never start an exercise program without talking to your healthcare provider first      · Manage medical conditions  Keep all appointments with your healthcare providers  Visit your eye doctor as directed  How can I make my home safer? · Add items to prevent falls in the bathroom  Put nonslip strips on your bath or shower floor to prevent you from slipping  Use a bath mat if you do not have carpet in the bathroom  This will prevent you from falling when you step out of the bath or shower  Use a shower seat so you do not need to stand while you shower  Sit on the toilet or a chair in your bathroom to dry yourself and put on clothing  This will prevent you from losing your balance from drying or dressing yourself while you are standing  · Keep paths clear  Remove books, shoes, and other objects from walkways and stairs  Place cords for telephones and lamps out of the way so that you do not need to walk over them  Tape them down if you cannot move them  Remove small rugs  If you cannot remove a rug, secure it with double-sided tape  This will prevent you from tripping  · Install bright lights in your home  Use night lights to help light paths to the bathroom or kitchen  Always turn on the light before you start walking  · Keep items you use often on shelves within reach  Do not use a step stool to help you reach an item  · Paint or place reflective tape on the edges of your stairs  This will help you see the stairs better  Call 911 or have someone else call if:   · You have fallen and are unconscious  · You have fallen and cannot move part of your body  Contact your healthcare provider if:   · You have fallen and have pain or a headache  · You have questions or concerns about your condition or care  CARE AGREEMENT:   You have the right to help plan your care  Learn about your health condition and how it may be treated  Discuss treatment options with your caregivers to decide what care you want to receive  You always have the right to refuse treatment  The above information is an  only  It is not intended as medical advice for individual conditions or treatments  Talk to your doctor, nurse or pharmacist before following any medical regimen to see if it is safe and effective for you  © 2017 [facility] Information is for End User's use only and may not be sold, redistributed or otherwise used for commercial purposes  All illustrations and images included in CareNotes® are the copyrighted property of [employer]  or Dakota Loaiza  Advance Directives   WHAT YOU NEED TO KNOW:   What are advance directives? Advance directives are legal documents that state your wishes and plans for medical care  These plans are made ahead of time in case you lose your ability to make decisions for yourself  Advance directives can apply to any medical decision, such as the treatments you want, and if you want to donate organs  What are the types of advance directives? There are many types of advance directives, and each state has rules about how to use them   You may choose a combination of any of the following:  · Living will: This is a written record of the treatment you want  You can also choose which treatments you do not want, which to limit, and which to stop at a certain time  This includes surgery, medicine, IV fluid, and tube feedings  · Durable power of  for healthcare [facility]): This is a written record that states who you want to make healthcare choices for you when you are unable to make them for yourself  This person, called a proxy, is usually a family member or a friend  You may choose more than 1 proxy  · Do not resuscitate (DNR) order:  A DNR order is used in case your heart stops beating or you stop breathing  It is a request not to have certain forms of treatment, such as CPR  A DNR order may be included in other types of advance directives  · Medical directive: This covers the care that you want if you are in a coma, near death, or unable to make decisions for yourself  You can list the treatments you want for each condition  Treatment may include pain medicine, surgery, blood transfusions, dialysis, IV or tube feedings, and a ventilator (breathing machine)  · Values history: This document has questions about your views, beliefs, and how you feel and think about life  This information can help others choose the care that you would choose  Why are advance directives important? An advance directive helps you control your care  Although spoken wishes may be used, it is better to have your wishes written down  Spoken wishes can be misunderstood, or not followed  Treatments may be given even if you do not want them  An advance directive may make it easier for your family to make difficult choices about your care  How do I decide what to put in my advance directives? · Make informed decisions:  Make sure you fully understand treatments or care you may receive   Think about the benefits and problems your decisions could cause for you or your family  Talk to healthcare providers if you have concerns or questions before you write down your wishes  You may also want to talk with your Faith or , or a   Check your state laws to make sure that what you put in your advance directive is legal      · Sign all forms:  Sign and date your advance directive when you have finished  You may also need 2 witnesses to sign the forms  Witnesses cannot be your doctor or his staff, your spouse, heirs or beneficiaries, people you owe money to, or your chosen proxy  Talk to your family, proxy, and healthcare providers about your advance directive  Give each person a copy, and keep one for yourself in a place you can get to easily  Do not keep it hidden or locked away  · Review and revise your plans: You can revise your advance directive at any time, as long as you are able to make decisions  Review your plan every year, and when there are changes in your life, or your health  When you make changes, let your family, proxy, and healthcare providers know  Give each a new copy  Where can I find more information? · [employer]  [address] , [address]  Phone: 5- 927 - [phone]  Phone: 9- 296 - 177-5424  Web Address: http://www  aafp org  · [address] [facility])  [address] [facility] [place] [address]  Phone: 0- [phone]  Phone: [phone]  Web Address: Alicia akins  [facility] AGREEMENT:   You have the right to help plan your care  To help with this plan, you must learn about your health condition and treatment options  You must also learn about advance directives and how they are used  Work with your healthcare providers to decide what care will be used to treat you  You always have the right to refuse treatment  The above information is an  only   It is not intended as medical advice for individual conditions or treatments  Talk to your doctor, nurse or pharmacist before following any medical regimen to see if it is safe and effective for you  © 2017 [address] [employer] Information is for End User's use only and may not be sold, redistributed or otherwise used for commercial purposes  All illustrations and images included in CareNotes® are the copyrighted property of [employer]  or Dakota Loaiza

## 2019-07-10 ENCOUNTER — TELEPHONE (OUTPATIENT)
Dept: FAMILY MEDICINE CLINIC | Facility: CLINIC | Age: 84
End: 2019-07-10

## 2019-07-10 ENCOUNTER — APPOINTMENT (OUTPATIENT)
Dept: LAB | Facility: CLINIC | Age: 84
End: 2019-07-10
Payer: COMMERCIAL

## 2019-07-10 ENCOUNTER — TRANSCRIBE ORDERS (OUTPATIENT)
Dept: LAB | Facility: CLINIC | Age: 84
End: 2019-07-10

## 2019-07-10 DIAGNOSIS — I10 ESSENTIAL HYPERTENSION: ICD-10-CM

## 2019-07-10 LAB
ALBUMIN SERPL BCP-MCNC: 3.5 G/DL (ref 3.5–5)
ALP SERPL-CCNC: 57 U/L (ref 46–116)
ALT SERPL W P-5'-P-CCNC: 24 U/L (ref 12–78)
ANION GAP SERPL CALCULATED.3IONS-SCNC: 6 MMOL/L (ref 4–13)
AST SERPL W P-5'-P-CCNC: 20 U/L (ref 5–45)
BILIRUB SERPL-MCNC: 0.97 MG/DL (ref 0.2–1)
BUN SERPL-MCNC: 16 MG/DL (ref 5–25)
CALCIUM SERPL-MCNC: 8.6 MG/DL (ref 8.3–10.1)
CHLORIDE SERPL-SCNC: 106 MMOL/L (ref 100–108)
CHOLEST SERPL-MCNC: 143 MG/DL (ref 50–200)
CO2 SERPL-SCNC: 26 MMOL/L (ref 21–32)
CREAT SERPL-MCNC: 0.7 MG/DL (ref 0.6–1.3)
ERYTHROCYTE [DISTWIDTH] IN BLOOD BY AUTOMATED COUNT: 13.4 % (ref 11.6–15.1)
GFR SERPL CREATININE-BSD FRML MDRD: 87 ML/MIN/1.73SQ M
GLUCOSE P FAST SERPL-MCNC: 98 MG/DL (ref 65–99)
HCT VFR BLD AUTO: 38.9 % (ref 36.5–49.3)
HDLC SERPL-MCNC: 49 MG/DL (ref 40–60)
HGB BLD-MCNC: 12.8 G/DL (ref 12–17)
LDLC SERPL CALC-MCNC: 81 MG/DL (ref 0–100)
MCH RBC QN AUTO: 33.2 PG (ref 26.8–34.3)
MCHC RBC AUTO-ENTMCNC: 32.9 G/DL (ref 31.4–37.4)
MCV RBC AUTO: 101 FL (ref 82–98)
NONHDLC SERPL-MCNC: 94 MG/DL
PLATELET # BLD AUTO: 191 THOUSANDS/UL (ref 149–390)
PMV BLD AUTO: 11.4 FL (ref 8.9–12.7)
POTASSIUM SERPL-SCNC: 3.6 MMOL/L (ref 3.5–5.3)
PROT SERPL-MCNC: 6.9 G/DL (ref 6.4–8.2)
RBC # BLD AUTO: 3.85 MILLION/UL (ref 3.88–5.62)
SODIUM SERPL-SCNC: 138 MMOL/L (ref 136–145)
TRIGL SERPL-MCNC: 63 MG/DL
TSH SERPL DL<=0.05 MIU/L-ACNC: 2.43 UIU/ML (ref 0.36–3.74)
WBC # BLD AUTO: 12.56 THOUSAND/UL (ref 4.31–10.16)

## 2019-07-10 PROCEDURE — 36415 COLL VENOUS BLD VENIPUNCTURE: CPT

## 2019-07-10 PROCEDURE — 84443 ASSAY THYROID STIM HORMONE: CPT

## 2019-07-10 PROCEDURE — 85027 COMPLETE CBC AUTOMATED: CPT

## 2019-07-10 PROCEDURE — 80053 COMPREHEN METABOLIC PANEL: CPT

## 2019-07-10 PROCEDURE — 80061 LIPID PANEL: CPT

## 2019-07-10 NOTE — TELEPHONE ENCOUNTER
----- Message from Macey De La Rosa MD sent at [id number]  3:18 PM EDT -----  Recent bw stable for pt

## 2019-08-05 DIAGNOSIS — I35.9 AORTIC VALVE DISEASE: ICD-10-CM

## 2019-08-05 DIAGNOSIS — I10 ESSENTIAL (PRIMARY) HYPERTENSION: ICD-10-CM

## 2019-08-05 RX ORDER — HYDROCHLOROTHIAZIDE 12.5 MG/1
CAPSULE, GELATIN COATED ORAL
Qty: 30 CAPSULE | Refills: 5 | Status: SHIPPED | OUTPATIENT
Start: 2019-08-05 | End: 2020-02-03

## 2019-08-05 RX ORDER — AMLODIPINE BESYLATE 5 MG/1
TABLET ORAL
Qty: 60 TABLET | Refills: 10 | Status: SHIPPED | OUTPATIENT
Start: 2019-08-05 | End: 2020-07-06

## 2019-08-05 RX ORDER — POTASSIUM CHLORIDE 20 MEQ/1
TABLET, EXTENDED RELEASE ORAL
Qty: 60 TABLET | Refills: 0 | Status: SHIPPED | OUTPATIENT
Start: 2019-08-05 | End: 2019-09-05 | Stop reason: SDUPTHER

## 2019-08-07 ENCOUNTER — REMOTE DEVICE CLINIC VISIT (OUTPATIENT)
Dept: CARDIOLOGY CLINIC | Facility: CLINIC | Age: 84
End: 2019-08-07
Payer: COMMERCIAL

## 2019-08-07 DIAGNOSIS — Z95.0 CARDIAC PACEMAKER IN SITU: Primary | ICD-10-CM

## 2019-08-07 PROCEDURE — 93296 REM INTERROG EVL PM/IDS: CPT | Performed by: INTERNAL MEDICINE

## 2019-08-07 PROCEDURE — 93294 REM INTERROG EVL PM/LDLS PM: CPT | Performed by: INTERNAL MEDICINE

## 2019-08-07 NOTE — PROGRESS NOTES
Results for orders placed or performed in visit on 08/07/19   Cardiac EP device report    Narrative    BSC-PM-DUAL  LATITUDE TRANSMISSION: BATTERY VOLTAGE ADEQUATE (4 5 YRS)  AP- 41%, - 55% (>40% [email or web address])  ALL AVAILABLE LEAD PARAMETERS WITHIN NORMAL LIMITS  1 ATR EPISODES, DURATION 20:28 MINS- AFLUTTER ON AVAILABLE EGRM  AT/AF BURDEN<1%  PT ON METOPROLOL TART  NO ASA OR ANTICOAGULATION  NOTIFIED DR KATZ  NORMAL DEVICE FUNCTION      EB

## 2019-09-05 DIAGNOSIS — I10 ESSENTIAL (PRIMARY) HYPERTENSION: ICD-10-CM

## 2019-09-05 RX ORDER — POTASSIUM CHLORIDE 20 MEQ/1
TABLET, EXTENDED RELEASE ORAL
Qty: 60 TABLET | Refills: 0 | Status: SHIPPED | OUTPATIENT
Start: 2019-09-05 | End: 2019-10-05 | Stop reason: SDUPTHER

## 2019-10-05 DIAGNOSIS — I10 ESSENTIAL (PRIMARY) HYPERTENSION: ICD-10-CM

## 2019-10-05 RX ORDER — POTASSIUM CHLORIDE 20 MEQ/1
TABLET, EXTENDED RELEASE ORAL
Qty: 60 TABLET | Refills: 0 | Status: SHIPPED | OUTPATIENT
Start: 2019-10-05 | End: 2019-11-05 | Stop reason: SDUPTHER

## 2019-11-05 DIAGNOSIS — I10 ESSENTIAL (PRIMARY) HYPERTENSION: ICD-10-CM

## 2019-11-05 RX ORDER — POTASSIUM CHLORIDE 20 MEQ/1
TABLET, EXTENDED RELEASE ORAL
Qty: 60 TABLET | Refills: 0 | Status: SHIPPED | OUTPATIENT
Start: 2019-11-05 | End: 2019-12-05 | Stop reason: SDUPTHER

## 2019-11-07 ENCOUNTER — OFFICE VISIT (OUTPATIENT)
Dept: FAMILY MEDICINE CLINIC | Facility: CLINIC | Age: 84
End: 2019-11-07
Payer: COMMERCIAL

## 2019-11-07 VITALS
HEART RATE: 57 BPM | HEIGHT: 73 IN | WEIGHT: 207.8 LBS | DIASTOLIC BLOOD PRESSURE: 80 MMHG | TEMPERATURE: 96.5 F | SYSTOLIC BLOOD PRESSURE: 130 MMHG | OXYGEN SATURATION: 99 % | RESPIRATION RATE: 16 BRPM | BODY MASS INDEX: 27.54 KG/M2

## 2019-11-07 DIAGNOSIS — H04.129 DRY EYE: Primary | ICD-10-CM

## 2019-11-07 PROCEDURE — 99213 OFFICE O/P EST LOW 20 MIN: CPT | Performed by: FAMILY MEDICINE

## 2019-11-07 NOTE — ASSESSMENT & PLAN NOTE
Dry eye  I suspect patient has a component of dry eye syndrome  He does not appear to have obvious signs of infection  He will continue with moisturizing drops unless he develops new symptoms  He will follow up with his ophthalmologist in 2 weeks

## 2019-11-07 NOTE — PROGRESS NOTES
FAMILY PRACTICE OFFICE VISIT       NAME: Rosibel Schulte  AGE: 80 y o  SEX: male       : 1935        MRN: [id number]    DATE: 2019  TIME: 1:13 PM    Assessment and Plan     Problem List Items Addressed This Visit        Other    Dry eye - Primary     Dry eye  I suspect patient has a component of dry eye syndrome  He does not appear to have obvious signs of infection  He will continue with moisturizing drops unless he develops new symptoms  He will follow up with his ophthalmologist in 2 weeks  Chief Complaint     Chief Complaint   Patient presents with    Eye Problem     Pt is here for b/l eye irritation       History of Present Illness     Patient describes irritation in the corner of his right eye  He went to an urgent care center and was prescribed erythromycin ointment for suspected corneal abrasion  Patient does not feel he has at that much discomfort or drainage to start using antibiotic  He has been using over-the-counter moisturizing drops and ointment feels symptoms have improved  Patient wanted 2nd opinion about possibility of pink eye  He denies any changes in visual acuity  He does have an appointment to see his ophthalmologist in 2 weeks      Review of Systems   Review of Systems   Constitutional: Negative  HENT: Negative  Eyes: Positive for itching  Negative for photophobia, pain and visual disturbance  Respiratory: Negative  Cardiovascular: Negative  Gastrointestinal: Negative          Active Problem List     Patient Active Problem List   Diagnosis    Aortic valve disease    Cervical radiculopathy    Hypertension    LVH (left ventricular hypertrophy)    Other malignant neoplasm of unspecified site    Spinal stenosis    Obsessive compulsive disorder    Dysthymic disorder    Gait disturbance    Dry eye       Past Medical History:  Past Medical History:   Diagnosis Date    Basal cell carcinoma     Bradycardia     Degenerative joint disease (DJD) of lumbar spine     Hypertension     Kidney stones     Pacemaker        Past Surgical History:  Past Surgical History:   Procedure Laterality Date    CARDIAC PACEMAKER PLACEMENT      CHOLECYSTECTOMY      EYE SURGERY      TONSILLECTOMY         Family History:  Family History   Problem Relation Age of Onset    Diabetes Mother     Heart disease Mother        Social History:  Social History     Socioeconomic History    Marital status: /Civil Union     Spouse name: Not on file    Number of children: Not on file    Years of education: Not on file    Highest education level: Not on file   Occupational History    Occupation: Retired   Social Needs    Financial resource strain: Not on file    Food insecurity:     Worry: Not on file     Inability: Not on file   [place] needs:     Medical: Not on file     Non-medical: Not on file   Tobacco Use    Smoking status: Never Smoker    Smokeless tobacco: Never Used   Substance and Sexual Activity    Alcohol use: Not Currently     Comment: occasionally; Per Allscript  No Alcohol use    Drug use: No    Sexual activity: Not on file   Lifestyle    Physical activity:     Days per week: Not on file     Minutes per session: Not on file    Stress: Not on file   Relationships    Social connections:     Talks on phone: Not on file     Gets together: Not on file     Attends Amish service: Not on file     Active member of club or organization: Not on file     Attends meetings of clubs or organizations: Not on file     Relationship status: Not on file    Intimate partner violence:     Fear of current or ex partner: Not on file     Emotionally abused: Not on file     Physically abused: Not on file     Forced sexual activity: Not on file   Other Topics Concern    Not on file   Social History Narrative    Not on file       Objective     Vitals:    11/07/19 1030   BP: 130/80   Pulse: 57   Resp: 16   Temp: (!) 96 5 °F (35 8 °C)   SpO2: 99% Wt Readings from Last 3 Encounters:   11/07/19 94 3 kg (207 lb 12 8 oz)   07/09/19 96 3 kg (212 lb 3 2 oz)   06/17/19 94 3 kg (208 lb)       Physical Exam   Constitutional: No distress  Eyes: Pupils are equal, round, and reactive to light  Conjunctivae and EOM are normal  Right eye exhibits no discharge  Left eye exhibits no discharge  No scleral icterus  Lymphadenopathy:     He has no cervical adenopathy         Pertinent Laboratory/Diagnostic Studies:  Lab Results   Component Value Date    GLUCOSE 114 04/08/2015    BUN 16 07/10/2019    CREATININE 0 70 07/10/2019    CALCIUM 8 6 07/10/2019     04/08/2015    K 3 6 07/10/2019    CO2 26 07/10/2019     07/10/2019     Lab Results   Component Value Date    ALT 24 07/10/2019    AST 20 07/10/2019    ALKPHOS 57 07/10/2019    BILITOT 0 78 04/07/2015       Lab Results   Component Value Date    WBC 12 56 (H) 07/10/2019    HGB 12 8 07/10/2019    HCT 38 9 07/10/2019     (H) 07/10/2019     07/10/2019       No results found for: TSH    Lab Results   Component Value Date    CHOL 132 03/24/2014     Lab Results   Component Value Date    TRIG 63 07/10/2019     Lab Results   Component Value Date    HDL 49 07/10/2019     Lab Results   Component Value Date    LDLCALC 81 07/10/2019     No results found for: HGBA1C    Results for orders placed or performed in visit on 07/10/19   CBC   Result Value Ref Range    WBC 12 56 (H) 4 31 - 10 16 Thousand/uL    RBC 3 85 (L) 3 88 - 5 62 Million/uL    Hemoglobin 12 8 12 0 - 17 0 g/dL    Hematocrit 38 9 36 5 - 49 3 %     (H) 82 - 98 fL    MCH 33 2 26 8 - 34 3 pg    MCHC 32 9 31 4 - 37 4 g/dL    RDW 13 4 11 6 - 15 1 %    Platelets 642 237 - 976 Thousands/uL    MPV 11 4 8 9 - 12 7 fL   Comprehensive metabolic panel   Result Value Ref Range    Sodium 138 136 - 145 mmol/L    Potassium 3 6 3 5 - 5 3 mmol/L    Chloride 106 100 - 108 mmol/L    CO2 26 21 - 32 mmol/L    ANION GAP 6 4 - 13 mmol/L    BUN 16 5 - 25 mg/dL Creatinine 0 70 0 60 - 1 30 mg/dL    Glucose, Fasting 98 65 - 99 mg/dL    Calcium 8 6 8 3 - 10 1 mg/dL    AST 20 5 - 45 U/L    ALT 24 12 - 78 U/L    Alkaline Phosphatase 57 46 - 116 U/L    Total Protein 6 9 6 4 - 8 2 g/dL    Albumin 3 5 3 5 - 5 0 g/dL    Total Bilirubin 0 97 0 20 - 1 00 mg/dL    eGFR 87 ml/min/1 73sq m   TSH, 3rd generation   Result Value Ref Range    TSH 3RD GENERATON 2 430 0 358 - 3 740 uIU/mL   Lipid panel   Result Value Ref Range    Cholesterol 143 50 - 200 mg/dL    Triglycerides 63 <=150 mg/dL    HDL, Direct 49 40 - 60 mg/dL    LDL Calculated 81 0 - 100 mg/dL    Non-HDL-Chol (CHOL-HDL) 94 mg/dl       No orders of the defined types were placed in this encounter  ALLERGIES:  No Known Allergies    Current Medications     Current Outpatient Medications   Medication Sig Dispense Refill    amLODIPine (NORVASC) 5 mg tablet TAKE 1 TABLET TWICE DAILY 60 tablet 10    B Complex Vitamins (VITAMIN B COMPLEX PO) Take 1 tablet by mouth daily      Cholecalciferol (VITAMIN D PO) Take by mouth      hydrochlorothiazide (MICROZIDE) 12 5 mg capsule TAKE 1 CAPSULE DAILY 30 capsule 5    ketotifen (ALAWAY) 0 025 % ophthalmic solution Administer 1 drop to both eyes daily      metoprolol tartrate (LOPRESSOR) 25 mg tablet TAKE 1/2 TABLET EVERY 12 HOURS 30 tablet 11    Multiple Vitamins-Minerals (MULTIVITAMIN ADULT PO) Take 1 tablet by mouth daily      potassium chloride (K-DUR,KLOR-CON) 20 mEq tablet TAKE 2 TABLETS DAILY AS DIRECTED 60 tablet 0     No current facility-administered medications for this visit            Health Maintenance     Health Maintenance   Topic Date Due    SLP PLAN OF CARE  1935    BMI: Followup Plan  05/26/1953    Fall Risk  07/09/2020    Medicare Annual Wellness Visit (AWV)  07/09/2020    BMI: Adult  11/07/2020    DTaP,Tdap,and Td Vaccines (2 - Td) 04/16/2025    INFLUENZA VACCINE  Completed    Pneumococcal Vaccine: 65+ Years  Completed    Pneumococcal Vaccine: Pediatrics (0 to 5 Years) and At-Risk Patients (6 to 59 Years)  Aged Out    HEPATITIS B VACCINES  Aged [employer] History   Administered Date(s) Administered     Influenza (IM) Preservative Free 10/07/2019    INFLUENZA 09/20/2014, 10/14/2016, 10/16/2017, 08/19/2018    Influenza Split High Dose Preservative Free IM 09/20/2014, 10/14/2016, 10/16/2017    Influenza TIV (IM) 11/04/2013    Pneumococcal Conjugate 13-Valent 04/16/2015    Pneumococcal Polysaccharide PPV23 10/01/2009    Td (adult), adsorbed 08/01/2010    Tdap 04/16/2015    Zoster Vaccine Recombinant 07/09/2019, 74/78/9231       Alba Calderon MD

## 2019-11-11 ENCOUNTER — REMOTE DEVICE CLINIC VISIT (OUTPATIENT)
Dept: CARDIOLOGY CLINIC | Facility: CLINIC | Age: 84
End: 2019-11-11
Payer: COMMERCIAL

## 2019-11-11 DIAGNOSIS — Z95.0 PACEMAKER: Primary | ICD-10-CM

## 2019-11-11 PROCEDURE — 93294 REM INTERROG EVL PM/LDLS PM: CPT | Performed by: INTERNAL MEDICINE

## 2019-11-11 PROCEDURE — 93296 REM INTERROG EVL PM/IDS: CPT | Performed by: INTERNAL MEDICINE

## 2019-11-11 NOTE — PROGRESS NOTES
Results for orders placed or performed in visit on 11/11/19   Cardiac EP device report    Narrative    BSC-PM-DUAL  LATITUDE TRANSMISSION: BATTERY VOLTAGE ADEQUATE (4 5 YRS)  AP 43%  57% (>40%/DDD 50)  ALL AVAILABLE LEAD PARAMETERS WITHIN NORMAL LIMITS  NO SIGNIFICANT HIGH RATE EPISODES  PACEMAKER FUNCTIONING APPROPRIATELY      EB

## 2019-11-18 RX ORDER — ERYTHROMYCIN 5 MG/G
OINTMENT OPHTHALMIC
Refills: 0 | COMMUNITY
Start: 2019-11-06 | End: 2019-12-06 | Stop reason: ALTCHOICE

## 2019-11-19 ENCOUNTER — OFFICE VISIT (OUTPATIENT)
Dept: FAMILY MEDICINE CLINIC | Facility: CLINIC | Age: 84
End: 2019-11-19
Payer: COMMERCIAL

## 2019-11-19 VITALS
HEIGHT: 73 IN | WEIGHT: 206 LBS | BODY MASS INDEX: 27.3 KG/M2 | TEMPERATURE: 98.6 F | RESPIRATION RATE: 18 BRPM | SYSTOLIC BLOOD PRESSURE: 126 MMHG | DIASTOLIC BLOOD PRESSURE: 80 MMHG | HEART RATE: 52 BPM | OXYGEN SATURATION: 97 %

## 2019-11-19 DIAGNOSIS — S01.01XA LACERATION OF SCALP, INITIAL ENCOUNTER: Primary | ICD-10-CM

## 2019-11-19 PROCEDURE — 99212 OFFICE O/P EST SF 10 MIN: CPT | Performed by: FAMILY MEDICINE

## 2019-11-19 PROCEDURE — 1036F TOBACCO NON-USER: CPT | Performed by: FAMILY MEDICINE

## 2019-11-19 PROCEDURE — 1160F RVW MEDS BY RX/DR IN RCRD: CPT | Performed by: FAMILY MEDICINE

## 2019-11-19 NOTE — ASSESSMENT & PLAN NOTE
Laceration of scalp  The area in question appears to be healing well with scab formation and no obvious signs of infection  Patient will keep the area clean and dry and call if he notices any interval changes

## 2019-11-19 NOTE — PROGRESS NOTES
FAMILY PRACTICE OFFICE VISIT       NAME: Lorra Dandy  AGE: 80 y o  SEX: male       : 1935        MRN: [id number]    DATE: 2019  TIME: 2:56 PM    Assessment and Plan     Problem List Items Addressed This Visit        Other    Laceration of scalp - Primary     Laceration of scalp  The area in question appears to be healing well with scab formation and no obvious signs of infection  Patient will keep the area clean and dry and call if he notices any interval changes  Chief Complaint     Chief Complaint   Patient presents with    Follow-up       History of Present Illness     Patient states he sustained a cut on his right scalp area after falling in his home  He slipped on a wet floor in his kitchen and hit his head upon falling  He denies any significant bleeding  He has been keeping the area clean and bandaged  He denies any significant headaches or change in vision  Review of Systems   Review of Systems   Constitutional: Negative  HENT: Negative  Respiratory: Negative  Cardiovascular: Negative  Gastrointestinal: Negative  Musculoskeletal: Negative  Skin:        As per HPI   Neurological: Negative          Active Problem List     Patient Active Problem List   Diagnosis    Aortic valve disease    Cervical radiculopathy    Hypertension    LVH (left ventricular hypertrophy)    Other malignant neoplasm of unspecified site    Spinal stenosis    Obsessive compulsive disorder    Dysthymic disorder    Gait disturbance    Dry eye    Laceration of scalp       Past Medical History:  Past Medical History:   Diagnosis Date    Basal cell carcinoma     Bradycardia     Degenerative joint disease (DJD) of lumbar spine     Hypertension     Kidney stones     Pacemaker        Past Surgical History:  Past Surgical History:   Procedure Laterality Date    CARDIAC PACEMAKER PLACEMENT      CHOLECYSTECTOMY      EYE SURGERY      TONSILLECTOMY Family History:  Family History   Problem Relation Age of Onset    Diabetes Mother     Heart disease Mother        Social History:  Social History     Socioeconomic History    Marital status: /Civil Union     Spouse name: Not on file    Number of children: Not on file    Years of education: Not on file    Highest education level: Not on file   Occupational History    Occupation: Retired   Social Needs    Financial resource strain: Not on file    Food insecurity:     Worry: Not on file     Inability: Not on file   [place] needs:     Medical: Not on file     Non-medical: Not on file   Tobacco Use    Smoking status: Never Smoker    Smokeless tobacco: Never Used   Substance and Sexual Activity    Alcohol use: Not Currently     Comment: occasionally; Per All[employer]  No Alcohol use    Drug use: No    Sexual activity: Not on file   Lifestyle    Physical activity:     Days per week: Not on file     Minutes per session: Not on file    Stress: Not on file   Relationships    Social connections:     Talks on phone: Not on file     Gets together: Not on file     Attends Mormon service: Not on file     Active member of club or organization: Not on file     Attends meetings of clubs or organizations: Not on file     Relationship status: Not on file    Intimate partner violence:     Fear of current or ex partner: Not on file     Emotionally abused: Not on file     Physically abused: Not on file     Forced sexual activity: Not on file   Other Topics Concern    Not on file   Social History Narrative    Not on file       Objective     Vitals:    11/19/19 1418   BP: 126/80   Pulse: (!) 52   Resp: 18   Temp: 98 6 °F (37 °C)   SpO2: 97%     Wt Readings from Last 3 Encounters:   11/19/19 93 4 kg (206 lb)   11/07/19 94 3 kg (207 lb 12 8 oz)   07/09/19 96 3 kg (212 lb 3 2 oz)       Physical Exam   Constitutional: He is oriented to person, place, and time  No distress     Cardiovascular: Normal rate, regular rhythm and normal heart sounds  No murmur heard  Pulmonary/Chest: Effort normal and breath sounds normal  No respiratory distress  He has no wheezes  He has no rales  Neurological: He is alert and oriented to person, place, and time  No cranial nerve deficit  Skin:   Patient with a 2 in horizontal incision that is well scab on right parietal area of his scalp  There is no evidence of redness or infection  There is no bleeding noted  Psychiatric: He has a normal mood and affect   His behavior is normal  Judgment and thought content normal        Pertinent Laboratory/Diagnostic Studies:  Lab Results   Component Value Date    GLUCOSE 114 04/08/2015    BUN 16 07/10/2019    CREATININE 0 70 07/10/2019    CALCIUM 8 6 07/10/2019     04/08/2015    K 3 6 07/10/2019    CO2 26 07/10/2019     07/10/2019     Lab Results   Component Value Date    ALT 24 07/10/2019    AST 20 07/10/2019    ALKPHOS 57 07/10/2019    BILITOT 0 78 04/07/2015       Lab Results   Component Value Date    WBC 12 56 (H) 07/10/2019    HGB 12 8 07/10/2019    HCT 38 9 07/10/2019     (H) 07/10/2019     07/10/2019       No results found for: TSH    Lab Results   Component Value Date    CHOL 132 03/24/2014     Lab Results   Component Value Date    TRIG 63 07/10/2019     Lab Results   Component Value Date    HDL 49 07/10/2019     Lab Results   Component Value Date    LDLCALC 81 07/10/2019     No results found for: HGBA1C    Results for orders placed or performed in visit on 07/10/19   CBC   Result Value Ref Range    WBC 12 56 (H) 4 31 - 10 16 Thousand/uL    RBC 3 85 (L) 3 88 - 5 62 Million/uL    Hemoglobin 12 8 12 0 - 17 0 g/dL    Hematocrit 38 9 36 5 - 49 3 %     (H) 82 - 98 fL    MCH 33 2 26 8 - 34 3 pg    MCHC 32 9 31 4 - 37 4 g/dL    RDW 13 4 11 6 - 15 1 %    Platelets 086 534 - 380 Thousands/uL    MPV 11 4 8 9 - 12 7 fL   Comprehensive metabolic panel   Result Value Ref Range    Sodium 138 136 - 145 mmol/L    Potassium 3 6 3 5 - 5 3 mmol/L    Chloride 106 100 - 108 mmol/L    CO2 26 21 - 32 mmol/L    ANION GAP 6 4 - 13 mmol/L    BUN 16 5 - 25 mg/dL    Creatinine 0 70 0 60 - 1 30 mg/dL    Glucose, Fasting 98 65 - 99 mg/dL    Calcium 8 6 8 3 - 10 1 mg/dL    AST 20 5 - 45 U/L    ALT 24 12 - 78 U/L    Alkaline Phosphatase 57 46 - 116 U/L    Total Protein 6 9 6 4 - 8 2 g/dL    Albumin 3 5 3 5 - 5 0 g/dL    Total Bilirubin 0 97 0 20 - 1 00 mg/dL    eGFR 87 ml/min/1 73sq m   TSH, 3rd generation   Result Value Ref Range    TSH 3RD GENERATON 2 430 0 358 - 3 740 uIU/mL   Lipid panel   Result Value Ref Range    Cholesterol 143 50 - 200 mg/dL    Triglycerides 63 <=150 mg/dL    HDL, Direct 49 40 - 60 mg/dL    LDL Calculated 81 0 - 100 mg/dL    Non-HDL-Chol (CHOL-HDL) 94 mg/dl       No orders of the defined types were placed in this encounter  ALLERGIES:  No Known Allergies    Current Medications     Current Outpatient Medications   Medication Sig Dispense Refill    amLODIPine (NORVASC) 5 mg tablet TAKE 1 TABLET TWICE DAILY 60 tablet 10    B Complex Vitamins (VITAMIN B COMPLEX PO) Take 1 tablet by mouth daily      Cholecalciferol (VITAMIN D PO) Take by mouth      erythromycin (ILOTYCIN) ophthalmic ointment 1 APPLICATION IN RIGHT EYE FOUR TIMES A DAY; APPLY SMALL STRIP OF OINTMENT INSIDE LOWER EYELID AND BLINK TO SPREAD X 5 DAYS  0    hydrochlorothiazide (MICROZIDE) 12 5 mg capsule TAKE 1 CAPSULE DAILY 30 capsule 5    ketotifen (ALAWAY) 0 025 % ophthalmic solution Administer 1 drop to both eyes daily      metoprolol tartrate (LOPRESSOR) 25 mg tablet TAKE 1/2 TABLET EVERY 12 HOURS 30 tablet 11    Multiple Vitamins-Minerals (MULTIVITAMIN ADULT PO) Take 1 tablet by mouth daily      potassium chloride (K-DUR,KLOR-CON) 20 mEq tablet TAKE 2 TABLETS DAILY AS DIRECTED 60 tablet 0     No current facility-administered medications for this visit            Health Maintenance     Health Maintenance   Topic Date Due    SLP PLAN OF CARE  1935    BMI: Followup Plan  05/26/1953    Fall Risk  07/09/2020    Medicare Annual Wellness Visit (AWV)  07/09/2020    BMI: Adult  11/07/2020    DTaP,Tdap,and Td Vaccines (2 - Td) 04/16/2025    Influenza Vaccine  Completed    Pneumococcal Vaccine: 65+ Years  Completed    Pneumococcal Vaccine: Pediatrics (0 to 5 Years) and At-Risk Patients (6 to 59 Years)  Aged Out    HIB Vaccine  Aged Out    Hepatitis B Vaccine  Aged Out    IPV Vaccine  Aged Out    Hepatitis A Vaccine  Aged Out    Meningococcal ACWY Vaccine  Aged Out    HPV Vaccine  Aged [employer] History   Administered Date(s) Administered     Influenza (IM) Preservative Free 10/07/2019    INFLUENZA 09/20/2014, 10/14/2016, 10/16/2017, 08/19/2018    Influenza Split High Dose Preservative Free IM 09/20/2014, 10/14/2016, 10/16/2017    Influenza TIV (IM) 11/04/2013    Pneumococcal Conjugate 13-Valent 04/16/2015    Pneumococcal Polysaccharide PPV23 10/01/2009    Td (adult), adsorbed 08/01/2010    Tdap 04/16/2015    Zoster Vaccine Recombinant 07/09/2019, 09/07/2019    influenza, trivalent, adjuvanted 37/74/9290       Chicho Lackey MD

## 2019-12-01 NOTE — PROGRESS NOTES
Cardiology Follow Up    Pineda Catherine  1935  [id number]  [facility]  [address] [address]  [address] [facility]  [phone]    1  Essential (primary) hypertension     2  Pacemaker     3  Aortic valve disease     4  SVT (supraventricular tachycardia) ([address] )     5  LVH (left ventricular hypertrophy)         Interval History:  Patient is here for a follow-up visit  He has a Guidant dual-chamber pacemaker  His most recent interrogation November 2019 demonstrated an appropriately functioning device with no significant high rate episodes noted  A prior pacer interrogation in November of 2018 suggested the possibility of atrial flutter  A Holter monitor after that demonstrated sinus rhythm  An echocardiogram done September 2016 demonstrated preserved LV systolic function with mild LVH and no significant valvular heart disease  Patient has been doing well  He has had no chest pain or significant dyspnea      Patient Active Problem List   Diagnosis    Aortic valve disease    Cervical radiculopathy    Hypertension    LVH (left ventricular hypertrophy)    Other malignant neoplasm of unspecified site    Spinal stenosis    Obsessive compulsive disorder    Dysthymic disorder    Gait disturbance    Dry eye    Laceration of scalp     Past Medical History:   Diagnosis Date    Basal cell carcinoma     Bradycardia     Degenerative joint disease (DJD) of lumbar spine     Hypertension     Kidney stones     Pacemaker      Social History     Socioeconomic History    Marital status: /Civil Union     Spouse name: Not on file    Number of children: Not on file    Years of education: Not on file    Highest education level: Not on file   Occupational History    Occupation: Retired   Social Needs    Financial resource strain: Not on file    Food insecurity:     Worry: Not on file     Inability: Not on file   Alison Coley Transportation needs:     Medical: Not on file     Non-medical: Not on file   Tobacco Use    Smoking status: Never Smoker    Smokeless tobacco: Never Used   Substance and Sexual Activity    Alcohol use: Not Currently     Comment: occasionally; Per All[employer]  No Alcohol use    Drug use: No    Sexual activity: Not on file   Lifestyle    Physical activity:     Days per week: Not on file     Minutes per session: Not on file    Stress: Not on file   Relationships    Social connections:     Talks on phone: Not on file     Gets together: Not on file     Attends Pentecostal service: Not on file     Active member of club or organization: Not on file     Attends meetings of clubs or organizations: Not on file     Relationship status: Not on file    Intimate partner violence:     Fear of current or ex partner: Not on file     Emotionally abused: Not on file     Physically abused: Not on file     Forced sexual activity: Not on file   Other Topics Concern    Not on file   Social History Narrative    Not on file      Family History   Problem Relation Age of Onset    Diabetes Mother     Heart disease Mother      Past Surgical History:   Procedure Laterality Date    CARDIAC PACEMAKER PLACEMENT      CHOLECYSTECTOMY      EYE SURGERY      TONSILLECTOMY         Current Outpatient Medications:     amLODIPine (NORVASC) 5 mg tablet, TAKE 1 TABLET TWICE DAILY, Disp: 60 tablet, Rfl: 10    B Complex Vitamins (VITAMIN B COMPLEX PO), Take 1 tablet by mouth daily, Disp: , Rfl:     Cholecalciferol (VITAMIN D PO), Take 1 capsule by mouth daily , Disp: , Rfl:     hydrochlorothiazide (MICROZIDE) 12 5 mg capsule, TAKE 1 CAPSULE DAILY, Disp: 30 capsule, Rfl: 5    metoprolol tartrate (LOPRESSOR) 25 mg tablet, TAKE 1/2 TABLET EVERY 12 HOURS, Disp: 30 tablet, Rfl: 11    Multiple Vitamins-Minerals (MULTIVITAMIN ADULT PO), Take 1 tablet by mouth daily, Disp: , Rfl:     potassium chloride (K-DUR,KLOR-CON) 20 mEq tablet, TAKE 2 TABLETS DAILY AS DIRECTED, Disp: 60 tablet, Rfl: 0    ketotifen (ALAWAY) 0 025 % ophthalmic solution, Administer 1 drop to both eyes daily, Disp: , Rfl:   No Known Allergies    Labs:not applicable  Imaging: No results found  Review of Systems:  Review of Systems   All other systems reviewed and are negative  Physical Exam:  /66 (BP Location: Left arm, Patient Position: Sitting, Cuff Size: Standard)   Pulse (!) 50   Ht 6' 1" (1 854 m)   Wt 92 2 kg (203 lb 3 2 oz)   BMI 26 81 kg/m²   Physical Exam   Constitutional: He is oriented to person, place, and time  He appears well-developed and well-nourished  HENT:   Head: Normocephalic and atraumatic  Eyes: Pupils are equal, round, and reactive to light  Conjunctivae are normal    Neck: Normal range of motion  Neck supple  Cardiovascular: Normal rate and normal heart sounds  Pulmonary/Chest: Effort normal and breath sounds normal    Neurological: He is alert and oriented to person, place, and time  Skin: Skin is warm and dry  Psychiatric: He has a normal mood and affect  Vitals reviewed  Discussion/Summary:I will continue the patient's present medical regimen  The patient appears well compensated  I have asked the patient to call if there is a problem in the interim otherwise I will see the patient in six months time

## 2019-12-05 DIAGNOSIS — I10 ESSENTIAL (PRIMARY) HYPERTENSION: ICD-10-CM

## 2019-12-05 RX ORDER — POTASSIUM CHLORIDE 20 MEQ/1
TABLET, EXTENDED RELEASE ORAL
Qty: 60 TABLET | Refills: 0 | Status: SHIPPED | OUTPATIENT
Start: 2019-12-05 | End: 2020-01-03

## 2019-12-06 ENCOUNTER — OFFICE VISIT (OUTPATIENT)
Dept: CARDIOLOGY CLINIC | Facility: CLINIC | Age: 84
End: 2019-12-06
Payer: COMMERCIAL

## 2019-12-06 VITALS
HEART RATE: 50 BPM | SYSTOLIC BLOOD PRESSURE: 126 MMHG | BODY MASS INDEX: 26.93 KG/M2 | HEIGHT: 73 IN | DIASTOLIC BLOOD PRESSURE: 66 MMHG | WEIGHT: 203.2 LBS

## 2019-12-06 DIAGNOSIS — Z95.0 PACEMAKER: ICD-10-CM

## 2019-12-06 DIAGNOSIS — I51.7 LVH (LEFT VENTRICULAR HYPERTROPHY): ICD-10-CM

## 2019-12-06 DIAGNOSIS — I10 ESSENTIAL (PRIMARY) HYPERTENSION: Primary | ICD-10-CM

## 2019-12-06 DIAGNOSIS — I47.1 SVT (SUPRAVENTRICULAR TACHYCARDIA) (HCC): ICD-10-CM

## 2019-12-06 DIAGNOSIS — I35.9 AORTIC VALVE DISEASE: ICD-10-CM

## 2019-12-06 PROCEDURE — 3074F SYST BP LT 130 MM HG: CPT | Performed by: INTERNAL MEDICINE

## 2019-12-06 PROCEDURE — 3078F DIAST BP <80 MM HG: CPT | Performed by: INTERNAL MEDICINE

## 2019-12-06 PROCEDURE — 99214 OFFICE O/P EST MOD 30 MIN: CPT | Performed by: INTERNAL MEDICINE

## 2019-12-09 ENCOUNTER — OFFICE VISIT (OUTPATIENT)
Dept: FAMILY MEDICINE CLINIC | Facility: CLINIC | Age: 84
End: 2019-12-09
Payer: COMMERCIAL

## 2019-12-09 VITALS
DIASTOLIC BLOOD PRESSURE: 74 MMHG | HEART RATE: 50 BPM | RESPIRATION RATE: 18 BRPM | SYSTOLIC BLOOD PRESSURE: 126 MMHG | OXYGEN SATURATION: 97 % | HEIGHT: 73 IN | TEMPERATURE: 97.8 F | WEIGHT: 206.8 LBS | BODY MASS INDEX: 27.41 KG/M2

## 2019-12-09 DIAGNOSIS — N48.1 BALANITIS: Primary | ICD-10-CM

## 2019-12-09 PROCEDURE — 1036F TOBACCO NON-USER: CPT | Performed by: NURSE PRACTITIONER

## 2019-12-09 PROCEDURE — 99213 OFFICE O/P EST LOW 20 MIN: CPT | Performed by: NURSE PRACTITIONER

## 2019-12-09 PROCEDURE — 1160F RVW MEDS BY RX/DR IN RCRD: CPT | Performed by: NURSE PRACTITIONER

## 2019-12-09 NOTE — PROGRESS NOTES
FAMILY PRACTICE OFFICE VISIT       NAME: Ian Marques  AGE: 80 y o  SEX: male       : 1935        MRN: [id number]    DATE: 2019    Assessment and Plan     Problem List Items Addressed This Visit     None      Visit Diagnoses     Balanitis    -  Primary        1  Balanitis  Mild balanitis, likely fungal in origin, given frequent incontinence  Will treat with clotrimazole cream 1%, apply twice daily for 3 weeks  Maintain good hygiene  If not improving over the next 1 week with this cream, he will call  He will call for any worsening symptoms  BMI Counseling: Body mass index is 27 28 kg/m²  The BMI is above normal  Nutrition recommendations include 3-5 servings of fruits/vegetables daily and consuming healthier snacks  Exercise recommendations include exercising 3-5 times per week  Eats a vegetarian diet, does consume eggs and dairy  Rides a stationary bike 30 minutes daily  Chief Complaint     Chief Complaint   Patient presents with    Bruise on penis       History of Present Illness     Ian Marques is an 51-year-old male presenting today for what he believes is a bruise on the tip of his penis  He noticed this last week, initially thought it was a sore, but then thought it was a bruise  He struggles with urinary incontinence, and thought maybe it was pinched with cleaning  States that has improved over the past few days, and looks almost normal now  No pain or itching  No penile drainage  Denies burning with urination  Urinates approximately every 3-4 hours which is at his baseline  Nocturia about every 2-3 hours, at baseline  Not currently sexually active  Review of Systems   Review of Systems   Constitutional: Negative for chills, diaphoresis, fatigue and fever  Genitourinary: Positive for genital sores   Negative for decreased urine volume, discharge, dysuria, frequency (at baseline), hematuria, penile pain, penile swelling, scrotal swelling, testicular pain and urgency         Active Problem List     Patient Active Problem List   Diagnosis    Aortic valve disease    Cervical radiculopathy    Hypertension    LVH (left ventricular hypertrophy)    Other malignant neoplasm of unspecified site    Spinal stenosis    Obsessive compulsive disorder    Dysthymic disorder    Gait disturbance    Dry eye    Laceration of scalp       Past Medical History:  Past Medical History:   Diagnosis Date    Basal cell carcinoma     Bradycardia     Degenerative joint disease (DJD) of lumbar spine     Hypertension     Kidney stones     Pacemaker        Past Surgical History:  Past Surgical History:   Procedure Laterality Date    CARDIAC PACEMAKER PLACEMENT      CHOLECYSTECTOMY      EYE SURGERY      TONSILLECTOMY         Family History:  Family History   Problem Relation Age of Onset    Diabetes Mother     Heart disease Mother        Social History:  Social History     Socioeconomic History    Marital status: /Civil Union     Spouse name: Not on file    Number of children: Not on file    Years of education: Not on file    Highest education level: Not on file   Occupational History    Occupation: Retired   Social Needs    Financial resource strain: Not on file    Food insecurity:     Worry: Not on file     Inability: Not on file   [employer] needs:     Medical: Not on file     Non-medical: Not on file   Tobacco Use    Smoking status: Never Smoker    Smokeless tobacco: Never Used   Substance and Sexual Activity    Alcohol use: Not Currently     Comment: occasionally; Per [employer]  No Alcohol use    Drug use: No    Sexual activity: Not on file   Lifestyle    Physical activity:     Days per week: Not on file     Minutes per session: Not on file    Stress: Not on file   Relationships    Social connections:     Talks on phone: Not on file     Gets together: Not on file     Attends Mu-ism service: Not on file     Active member of club or organization: Not on file     Attends meetings of clubs or organizations: Not on file     Relationship status: Not on file    Intimate partner violence:     Fear of current or ex partner: Not on file     Emotionally abused: Not on file     Physically abused: Not on file     Forced sexual activity: Not on file   Other Topics Concern    Not on file   Social History Narrative    Not on file       I have reviewed the patient's medical history in detail; there are no changes to the history as noted in the electronic medical record  Objective     Vitals:    12/09/19 1535   BP: 126/74   BP Location: Left arm   Patient Position: Sitting   Cuff Size: Adult   Pulse: (!) 50   Resp: 18   Temp: 97 8 °F (36 6 °C)   TempSrc: Tympanic   SpO2: 97%   Weight: 93 8 kg (206 lb 12 8 oz)   Height: 6' 1" (1 854 m)     Wt Readings from Last 3 Encounters:   12/09/19 93 8 kg (206 lb 12 8 oz)   12/06/19 92 2 kg (203 lb 3 2 oz)   11/19/19 93 4 kg (206 lb)     Physical Exam   Constitutional: He appears well-developed and well-nourished  No distress  Cardiovascular: Normal rate and normal heart sounds  A regularly irregular rhythm present  Pulmonary/Chest: Effort normal and breath sounds normal    Genitourinary:   Genitourinary Comments: Ventral penis tip, and first ventral 1/3 of shaft with erythema, small amount of smegma where glans meets shaft of penis  Psychiatric: He has a normal mood and affect  Nursing note and vitals reviewed           ALLERGIES:  No Known Allergies    Current Medications     Current Outpatient Medications   Medication Sig Dispense Refill    amLODIPine (NORVASC) 5 mg tablet TAKE 1 TABLET TWICE DAILY 60 tablet 10    B Complex Vitamins (VITAMIN B COMPLEX PO) Take 1 tablet by mouth daily      Cholecalciferol (VITAMIN D PO) Take 1 capsule by mouth daily       hydrochlorothiazide (MICROZIDE) 12 5 mg capsule TAKE 1 CAPSULE DAILY 30 capsule 5    ketotifen (ALAWAY) 0 025 % ophthalmic solution Administer 1 drop to both eyes daily      metoprolol tartrate (LOPRESSOR) 25 mg tablet TAKE 1/2 TABLET EVERY 12 HOURS 30 tablet 11    Multiple Vitamins-Minerals (MULTIVITAMIN ADULT PO) Take 1 tablet by mouth daily      potassium chloride (K-DUR,KLOR-CON) 20 mEq tablet TAKE 2 TABLETS DAILY AS DIRECTED 60 tablet 0     No current facility-administered medications for this visit            Health Maintenance     Health Maintenance   Topic Date Due    SLP PLAN OF CARE  1935    BMI: Followup Plan  05/26/1953    Fall Risk  07/09/2020    Medicare Annual Wellness Visit (AWV)  07/09/2020    BMI: Adult  12/06/2020    DTaP,Tdap,and Td Vaccines (2 - Td) 04/16/2025    Influenza Vaccine  Completed    Pneumococcal Vaccine: 65+ Years  Completed    Pneumococcal Vaccine: Pediatrics (0 to 5 Years) and At-Risk Patients (6 to 59 Years)  Aged Out    HIB Vaccine  Aged Out    Hepatitis B Vaccine  Aged Out    IPV Vaccine  Aged Out    Hepatitis A Vaccine  Aged Out    Meningococcal ACWY Vaccine  Aged Out    HPV Vaccine  Aged [employer] History   Administered Date(s) Administered     Influenza (IM) Preservative Free 10/07/2019    INFLUENZA 09/20/2014, 10/14/2016, 10/16/2017, 08/19/2018    Influenza Split High Dose Preservative Free IM 09/20/2014, 10/14/2016, 10/16/2017    Influenza TIV (IM) 11/04/2013    Pneumococcal Conjugate 13-Valent 04/16/2015    Pneumococcal Polysaccharide PPV23 10/01/2009    Td (adult), adsorbed 08/01/2010    Tdap 04/16/2015    Zoster Vaccine Recombinant 07/09/2019, 09/07/2019    influenza, trivalent, adjuvanted 09/14/2019       YAW Franco

## 2019-12-10 ENCOUNTER — TELEPHONE (OUTPATIENT)
Dept: FAMILY MEDICINE CLINIC | Facility: CLINIC | Age: 84
End: 2019-12-10

## 2019-12-10 NOTE — TELEPHONE ENCOUNTER
Spoke to patient via telephone, answered all questions regarding where to apply lotrimin cream, and hygiene questions  He will call with any further questions or concerns

## 2019-12-10 NOTE — TELEPHONE ENCOUNTER
Patient called and stated that he has some questions for you  I tried helping him as he wanted to know how many times to apply the cream and where  I read your note and told him and he was not happy with my telling him what to do and he wanted to talk to you only  Can you please call patient to advise? He states that if he does not talk to you he will go and talk to another

## 2020-01-02 ENCOUNTER — OFFICE VISIT (OUTPATIENT)
Dept: FAMILY MEDICINE CLINIC | Facility: CLINIC | Age: 85
End: 2020-01-02
Payer: COMMERCIAL

## 2020-01-02 VITALS
WEIGHT: 207.2 LBS | OXYGEN SATURATION: 98 % | HEART RATE: 56 BPM | DIASTOLIC BLOOD PRESSURE: 70 MMHG | TEMPERATURE: 95.6 F | HEIGHT: 73 IN | RESPIRATION RATE: 16 BRPM | SYSTOLIC BLOOD PRESSURE: 130 MMHG | BODY MASS INDEX: 27.46 KG/M2

## 2020-01-02 DIAGNOSIS — N48.1 BALANITIS: Primary | ICD-10-CM

## 2020-01-02 PROCEDURE — 1160F RVW MEDS BY RX/DR IN RCRD: CPT | Performed by: FAMILY MEDICINE

## 2020-01-02 PROCEDURE — 99213 OFFICE O/P EST LOW 20 MIN: CPT | Performed by: FAMILY MEDICINE

## 2020-01-02 PROCEDURE — 3075F SYST BP GE 130 - 139MM HG: CPT | Performed by: FAMILY MEDICINE

## 2020-01-02 PROCEDURE — 1036F TOBACCO NON-USER: CPT | Performed by: FAMILY MEDICINE

## 2020-01-02 PROCEDURE — 3078F DIAST BP <80 MM HG: CPT | Performed by: FAMILY MEDICINE

## 2020-01-02 NOTE — PROGRESS NOTES
FAMILY PRACTICE OFFICE VISIT       NAME: Jem Meyer  AGE: 80 y o  SEX: male       : 1935        MRN: [id number]    DATE: 2020  TIME: 2:42 PM    Assessment and Plan     Problem List Items Addressed This Visit        Genitourinary    Balanitis - Primary     Balanitis  It appears patient's previous symptoms have completely resolved using clotrimazole  He is informed that he should not worry about being contagious to any other person as he is not sexually active at this time  Patient will call if he develops any recurrence                   Chief Complaint     Chief Complaint   Patient presents with    Rash     pt is here for rash, irritation on penis       History of Present Illness     Patient here for follow-up of previously diagnosed balanitis  He did use clotrimazole for 2-3 weeks  Currently he denies any rash or discomfort  Occasionally does get mild irritation from riding his stationary bicycle while wearing shorts  Review of Systems   Review of Systems   Constitutional: Negative      Skin:        As per HPI       Active Problem List     Patient Active Problem List   Diagnosis    Aortic valve disease    Cervical radiculopathy    Hypertension    LVH (left ventricular hypertrophy)    Other malignant neoplasm of unspecified site    Spinal stenosis    Obsessive compulsive disorder    Dysthymic disorder    Gait disturbance    Dry eye    Laceration of scalp    Balanitis       Past Medical History:  Past Medical History:   Diagnosis Date    Basal cell carcinoma     Bradycardia     Degenerative joint disease (DJD) of lumbar spine     Hypertension     Kidney stones     Pacemaker        Past Surgical History:  Past Surgical History:   Procedure Laterality Date    CARDIAC PACEMAKER PLACEMENT      CHOLECYSTECTOMY      EYE SURGERY      TONSILLECTOMY         Family History:  Family History   Problem Relation Age of Onset    Diabetes Mother     Heart disease Mother Social History:  Social History     Socioeconomic History    Marital status: /Civil Union     Spouse name: Not on file    Number of children: Not on file    Years of education: Not on file    Highest education level: Not on file   Occupational History    Occupation: Retired   Social Needs    Financial resource strain: Not on file    Food insecurity:     Worry: Not on file     Inability: Not on file   [employer] needs:     Medical: Not on file     Non-medical: Not on file   Tobacco Use    Smoking status: Never Smoker    Smokeless tobacco: Never Used   Substance and Sexual Activity    Alcohol use: Not Currently     Comment: occasionally; Per [employer]  No Alcohol use    Drug use: No    Sexual activity: Not on file   Lifestyle    Physical activity:     Days per week: Not on file     Minutes per session: Not on file    Stress: Not on file   Relationships    Social connections:     Talks on phone: Not on file     Gets together: Not on file     Attends Catholic service: Not on file     Active member of club or organization: Not on file     Attends meetings of clubs or organizations: Not on file     Relationship status: Not on file    Intimate partner violence:     Fear of current or ex partner: Not on file     Emotionally abused: Not on file     Physically abused: Not on file     Forced sexual activity: Not on file   Other Topics Concern    Not on file   Social History Narrative    Not on file       Objective     Vitals:    01/02/20 1355   BP: 130/70   Pulse: 56   Resp: 16   Temp: (!) 95 6 °F (35 3 °C)   SpO2: 98%     Wt Readings from Last 3 Encounters:   01/02/20 94 kg (207 lb 3 2 oz)   12/09/19 93 8 kg (206 lb 12 8 oz)   12/06/19 92 2 kg (203 lb 3 2 oz)       Physical Exam   Constitutional: No distress  Genitourinary: Penis normal  No penile tenderness  Genitourinary Comments: Normal male anatomy  Negative penile discharge    Negative redness or rash noted anywhere Pertinent Laboratory/Diagnostic Studies:  Lab Results   Component Value Date    GLUCOSE 114 04/08/2015    BUN 16 07/10/2019    CREATININE 0 70 07/10/2019    CALCIUM 8 6 07/10/2019     04/08/2015    K 3 6 07/10/2019    CO2 26 07/10/2019     07/10/2019     Lab Results   Component Value Date    ALT 24 07/10/2019    AST 20 07/10/2019    ALKPHOS 57 07/10/2019    BILITOT 0 78 04/07/2015       Lab Results   Component Value Date    WBC 12 56 (H) 07/10/2019    HGB 12 8 07/10/2019    HCT 38 9 07/10/2019     (H) 07/10/2019     07/10/2019       No results found for: TSH    Lab Results   Component Value Date    CHOL 132 03/24/2014     Lab Results   Component Value Date    TRIG 63 07/10/2019     Lab Results   Component Value Date    HDL 49 07/10/2019     Lab Results   Component Value Date    LDLCALC 81 07/10/2019     No results found for: HGBA1C    Results for orders placed or performed in visit on 07/10/19   CBC   Result Value Ref Range    WBC 12 56 (H) 4 31 - 10 16 Thousand/uL    RBC 3 85 (L) 3 88 - 5 62 Million/uL    Hemoglobin 12 8 12 0 - 17 0 g/dL    Hematocrit 38 9 36 5 - 49 3 %     (H) 82 - 98 fL    MCH 33 2 26 8 - 34 3 pg    MCHC 32 9 31 4 - 37 4 g/dL    RDW 13 4 11 6 - 15 1 %    Platelets 549 897 - 951 Thousands/uL    MPV 11 4 8 9 - 12 7 fL   Comprehensive metabolic panel   Result Value Ref Range    Sodium 138 136 - 145 mmol/L    Potassium 3 6 3 5 - 5 3 mmol/L    Chloride 106 100 - 108 mmol/L    CO2 26 21 - 32 mmol/L    ANION GAP 6 4 - 13 mmol/L    BUN 16 5 - 25 mg/dL    Creatinine 0 70 0 60 - 1 30 mg/dL    Glucose, Fasting 98 65 - 99 mg/dL    Calcium 8 6 8 3 - 10 1 mg/dL    AST 20 5 - 45 U/L    ALT 24 12 - 78 U/L    Alkaline Phosphatase 57 46 - 116 U/L    Total Protein 6 9 6 4 - 8 2 g/dL    Albumin 3 5 3 5 - 5 0 g/dL    Total Bilirubin 0 97 0 20 - 1 00 mg/dL    eGFR 87 ml/min/1 73sq m   TSH, 3rd generation   Result Value Ref Range    TSH 3RD GENERATON 2 430 0 358 - 3 740 uIU/mL Lipid panel   Result Value Ref Range    Cholesterol 143 50 - 200 mg/dL    Triglycerides 63 <=150 mg/dL    HDL, Direct 49 40 - 60 mg/dL    LDL Calculated 81 0 - 100 mg/dL    Non-HDL-Chol (CHOL-HDL) 94 mg/dl       No orders of the defined types were placed in this encounter  ALLERGIES:  No Known Allergies    Current Medications     Current Outpatient Medications   Medication Sig Dispense Refill    amLODIPine (NORVASC) 5 mg tablet TAKE 1 TABLET TWICE DAILY 60 tablet 10    B Complex Vitamins (VITAMIN B COMPLEX PO) Take 1 tablet by mouth daily      Cholecalciferol (VITAMIN D PO) Take 1 capsule by mouth daily       hydrochlorothiazide (MICROZIDE) 12 5 mg capsule TAKE 1 CAPSULE DAILY 30 capsule 5    metoprolol tartrate (LOPRESSOR) 25 mg tablet TAKE 1/2 TABLET EVERY 12 HOURS 30 tablet 11    Multiple Vitamins-Minerals (MULTIVITAMIN ADULT PO) Take 1 tablet by mouth daily      potassium chloride (K-DUR,KLOR-CON) 20 mEq tablet TAKE 2 TABLETS DAILY AS DIRECTED 60 tablet 0     No current facility-administered medications for this visit            Health Maintenance     Health Maintenance   Topic Date Due    SLP PLAN OF CARE  1935    Fall Risk  07/09/2020    Medicare Annual Wellness Visit (AWV)  07/09/2020    BMI: Followup Plan  12/09/2020    BMI: Adult  01/02/2021    DTaP,Tdap,and Td Vaccines (2 - Td) 04/16/2025    Influenza Vaccine  Completed    Pneumococcal Vaccine: 65+ Years  Completed    Pneumococcal Vaccine: Pediatrics (0 to 5 Years) and At-Risk Patients (6 to 59 Years)  Aged Out    HIB Vaccine  Aged Out    Hepatitis B Vaccine  Aged Out    IPV Vaccine  Aged Out    Hepatitis A Vaccine  Aged Out    Meningococcal ACWY Vaccine  Aged Out    HPV Vaccine  Aged [employer] History   Administered Date(s) Administered     Influenza (IM) Preservative Free 10/07/2019    INFLUENZA 09/20/2014, 10/14/2016, 10/16/2017, 08/19/2018    Influenza Split High Dose Preservative Free IM 09/20/2014, 10/14/2016, 10/16/2017    Influenza TIV (IM) 11/04/2013    Pneumococcal Conjugate 13-Valent 04/16/2015    Pneumococcal Polysaccharide PPV23 10/01/2009    Td (adult), adsorbed 08/01/2010    Tdap 04/16/2015    Zoster Vaccine Recombinant 07/09/2019, 09/07/2019    influenza, trivalent, adjuvanted 40/11/1613       Coy Pantoja MD

## 2020-01-02 NOTE — ASSESSMENT & PLAN NOTE
Balanitis  It appears patient's previous symptoms have completely resolved using clotrimazole  He is informed that he should not worry about being contagious to any other person as he is not sexually active at this time    Patient will call if he develops any recurrence

## 2020-01-03 DIAGNOSIS — I10 ESSENTIAL (PRIMARY) HYPERTENSION: ICD-10-CM

## 2020-01-03 RX ORDER — POTASSIUM CHLORIDE 20 MEQ/1
TABLET, EXTENDED RELEASE ORAL
Qty: 60 TABLET | Refills: 0 | Status: SHIPPED | OUTPATIENT
Start: 2020-01-03 | End: 2020-02-03

## 2020-02-03 DIAGNOSIS — I10 ESSENTIAL (PRIMARY) HYPERTENSION: ICD-10-CM

## 2020-02-03 RX ORDER — POTASSIUM CHLORIDE 20 MEQ/1
TABLET, EXTENDED RELEASE ORAL
Qty: 60 TABLET | Refills: 0 | Status: SHIPPED | OUTPATIENT
Start: 2020-02-03 | End: 2020-03-05

## 2020-02-03 RX ORDER — HYDROCHLOROTHIAZIDE 12.5 MG/1
CAPSULE, GELATIN COATED ORAL
Qty: 30 CAPSULE | Refills: 0 | Status: SHIPPED | OUTPATIENT
Start: 2020-02-03 | End: 2020-04-07

## 2020-02-04 ENCOUNTER — IN-CLINIC DEVICE VISIT (OUTPATIENT)
Dept: CARDIOLOGY CLINIC | Facility: CLINIC | Age: 85
End: 2020-02-04
Payer: COMMERCIAL

## 2020-02-04 DIAGNOSIS — Z95.0 CARDIAC PACEMAKER IN SITU: Primary | ICD-10-CM

## 2020-02-04 PROCEDURE — 93280 PM DEVICE PROGR EVAL DUAL: CPT | Performed by: INTERNAL MEDICINE

## 2020-02-04 NOTE — PROGRESS NOTES
Results for orders placed or performed in visit on 02/04/20   Cardiac EP device report    Narrative    BSC-PM-DUAL/ NOT MRI CONDITIONAL  DEVICE INTERROGATED IN THE [place] OFFICE: BATTERY VOLTAGE ADEQUATE  AP 42%  57%  ALL LEAD PARAMETERS & TRENDS WITHIN NORMAL LIMITS  1 BREIF NSVT NOTED  PT ON METO TART & EF 60% (2016)  NO PROGRAMMING CHANGES MADE TO DEVICE PARAMETERS  NORMAL DEVICE FUNCTION   NC

## 2020-03-05 DIAGNOSIS — I10 ESSENTIAL (PRIMARY) HYPERTENSION: ICD-10-CM

## 2020-03-05 RX ORDER — POTASSIUM CHLORIDE 20 MEQ/1
TABLET, EXTENDED RELEASE ORAL
Qty: 60 TABLET | Refills: 0 | Status: SHIPPED | OUTPATIENT
Start: 2020-03-05 | End: 2020-04-07

## 2020-04-06 DIAGNOSIS — I10 ESSENTIAL (PRIMARY) HYPERTENSION: ICD-10-CM

## 2020-04-06 DIAGNOSIS — I47.1 SVT (SUPRAVENTRICULAR TACHYCARDIA) (HCC): ICD-10-CM

## 2020-04-07 RX ORDER — HYDROCHLOROTHIAZIDE 12.5 MG/1
CAPSULE, GELATIN COATED ORAL
Qty: 30 CAPSULE | Refills: 0 | Status: SHIPPED | OUTPATIENT
Start: 2020-04-07 | End: 2020-05-05

## 2020-04-07 RX ORDER — POTASSIUM CHLORIDE 20 MEQ/1
TABLET, EXTENDED RELEASE ORAL
Qty: 60 TABLET | Refills: 0 | Status: SHIPPED | OUTPATIENT
Start: 2020-04-07 | End: 2020-05-05

## 2020-05-05 DIAGNOSIS — I10 ESSENTIAL (PRIMARY) HYPERTENSION: ICD-10-CM

## 2020-05-05 RX ORDER — HYDROCHLOROTHIAZIDE 12.5 MG/1
CAPSULE, GELATIN COATED ORAL
Qty: 30 CAPSULE | Refills: 0 | Status: SHIPPED | OUTPATIENT
Start: 2020-05-05 | End: 2020-06-08

## 2020-05-05 RX ORDER — POTASSIUM CHLORIDE 20 MEQ/1
TABLET, EXTENDED RELEASE ORAL
Qty: 60 TABLET | Refills: 0 | Status: SHIPPED | OUTPATIENT
Start: 2020-05-05 | End: 2020-06-08

## 2020-05-11 ENCOUNTER — REMOTE DEVICE CLINIC VISIT (OUTPATIENT)
Dept: CARDIOLOGY CLINIC | Facility: CLINIC | Age: 85
End: 2020-05-11
Payer: COMMERCIAL

## 2020-05-11 DIAGNOSIS — Z95.0 PACEMAKER: Primary | ICD-10-CM

## 2020-05-11 PROCEDURE — 93296 REM INTERROG EVL PM/IDS: CPT | Performed by: INTERNAL MEDICINE

## 2020-05-11 PROCEDURE — 93294 REM INTERROG EVL PM/LDLS PM: CPT | Performed by: INTERNAL MEDICINE

## 2020-06-08 DIAGNOSIS — I10 ESSENTIAL (PRIMARY) HYPERTENSION: ICD-10-CM

## 2020-06-08 RX ORDER — POTASSIUM CHLORIDE 20 MEQ/1
TABLET, EXTENDED RELEASE ORAL
Qty: 60 TABLET | Refills: 0 | Status: SHIPPED | OUTPATIENT
Start: 2020-06-08 | End: 2020-07-06

## 2020-06-08 RX ORDER — HYDROCHLOROTHIAZIDE 12.5 MG/1
CAPSULE, GELATIN COATED ORAL
Qty: 30 CAPSULE | Refills: 0 | Status: SHIPPED | OUTPATIENT
Start: 2020-06-08 | End: 2020-07-06

## 2020-07-06 DIAGNOSIS — I10 ESSENTIAL (PRIMARY) HYPERTENSION: ICD-10-CM

## 2020-07-06 DIAGNOSIS — I35.9 AORTIC VALVE DISEASE: ICD-10-CM

## 2020-07-06 RX ORDER — HYDROCHLOROTHIAZIDE 12.5 MG/1
CAPSULE, GELATIN COATED ORAL
Qty: 30 CAPSULE | Refills: 0 | Status: SHIPPED | OUTPATIENT
Start: 2020-07-06 | End: 2020-08-05

## 2020-07-06 RX ORDER — AMLODIPINE BESYLATE 5 MG/1
TABLET ORAL
Qty: 60 TABLET | Refills: 10 | Status: SHIPPED | OUTPATIENT
Start: 2020-07-06 | End: 2021-06-09

## 2020-07-06 RX ORDER — POTASSIUM CHLORIDE 20 MEQ/1
TABLET, EXTENDED RELEASE ORAL
Qty: 60 TABLET | Refills: 0 | Status: SHIPPED | OUTPATIENT
Start: 2020-07-06 | End: 2020-08-05

## 2020-08-05 DIAGNOSIS — I10 ESSENTIAL (PRIMARY) HYPERTENSION: ICD-10-CM

## 2020-08-05 RX ORDER — POTASSIUM CHLORIDE 20 MEQ/1
TABLET, EXTENDED RELEASE ORAL
Qty: 60 TABLET | Refills: 0 | Status: SHIPPED | OUTPATIENT
Start: 2020-08-05 | End: 2020-09-09

## 2020-08-05 RX ORDER — HYDROCHLOROTHIAZIDE 12.5 MG/1
CAPSULE, GELATIN COATED ORAL
Qty: 30 CAPSULE | Refills: 0 | Status: SHIPPED | OUTPATIENT
Start: 2020-08-05 | End: 2020-09-09

## 2020-08-27 ENCOUNTER — REMOTE DEVICE CLINIC VISIT (OUTPATIENT)
Dept: CARDIOLOGY CLINIC | Facility: CLINIC | Age: 85
End: 2020-08-27
Payer: COMMERCIAL

## 2020-08-27 DIAGNOSIS — Z95.0 CARDIAC PACEMAKER IN SITU: Primary | ICD-10-CM

## 2020-08-27 PROCEDURE — 93294 REM INTERROG EVL PM/LDLS PM: CPT | Performed by: INTERNAL MEDICINE

## 2020-08-27 PROCEDURE — 93296 REM INTERROG EVL PM/IDS: CPT | Performed by: INTERNAL MEDICINE

## 2020-08-27 NOTE — PROGRESS NOTES
Results for orders placed or performed in visit on 08/27/20   Cardiac EP device report    Narrative    BSC-PM-DUAL/ NOT MRI CONDITIONAL  LATITUDE TRANSMISSION: BATTERY STATUS "OK"  AP 46%  64%  ALL AVAILABLE LEAD PARAMETERS WITHIN NORMAL LIMITS  1 NSVT NOTED  PT ON METO TART & EF 60% (2016)  DR Kai Odell  NORMAL DEVICE FUNCTION   NC       Current Outpatient Medications:     amLODIPine (NORVASC) 5 mg tablet, TAKE 1 TABLET TWICE DAILY, Disp: 60 tablet, Rfl: 10    B Complex Vitamins (VITAMIN B COMPLEX PO), Take 1 tablet by mouth daily, Disp: , Rfl:     Cholecalciferol (VITAMIN D PO), Take 1 capsule by mouth daily , Disp: , Rfl:     hydrochlorothiazide (MICROZIDE) 12 5 mg capsule, TAKE 1 CAPSULE DAILY, Disp: 30 capsule, Rfl: 0    metoprolol tartrate (LOPRESSOR) 25 mg tablet, TAKE 1/2 TABLET EVERY 12 HOURS, Disp: 30 tablet, Rfl: 11    Multiple Vitamins-Minerals (MULTIVITAMIN ADULT PO), Take 1 tablet by mouth daily, Disp: , Rfl:     potassium chloride (K-DUR,KLOR-CON) 20 mEq tablet, TAKE 2 TABLETS DAILY AS DIRECTED, Disp: 60 tablet, Rfl: 0

## 2020-09-08 DIAGNOSIS — I10 ESSENTIAL (PRIMARY) HYPERTENSION: ICD-10-CM

## 2020-09-09 RX ORDER — HYDROCHLOROTHIAZIDE 12.5 MG/1
CAPSULE, GELATIN COATED ORAL
Qty: 30 CAPSULE | Refills: 0 | Status: SHIPPED | OUTPATIENT
Start: 2020-09-09 | End: 2020-10-06

## 2020-09-09 RX ORDER — POTASSIUM CHLORIDE 20 MEQ/1
TABLET, EXTENDED RELEASE ORAL
Qty: 60 TABLET | Refills: 0 | Status: SHIPPED | OUTPATIENT
Start: 2020-09-09 | End: 2020-10-06

## 2020-10-06 DIAGNOSIS — I10 ESSENTIAL (PRIMARY) HYPERTENSION: ICD-10-CM

## 2020-10-06 RX ORDER — HYDROCHLOROTHIAZIDE 12.5 MG/1
CAPSULE, GELATIN COATED ORAL
Qty: 30 CAPSULE | Refills: 0 | Status: SHIPPED | OUTPATIENT
Start: 2020-10-06 | End: 2020-11-06

## 2020-10-06 RX ORDER — POTASSIUM CHLORIDE 20 MEQ/1
TABLET, EXTENDED RELEASE ORAL
Qty: 60 TABLET | Refills: 0 | Status: SHIPPED | OUTPATIENT
Start: 2020-10-06 | End: 2020-11-06

## 2020-11-06 DIAGNOSIS — I10 ESSENTIAL (PRIMARY) HYPERTENSION: ICD-10-CM

## 2020-11-06 RX ORDER — HYDROCHLOROTHIAZIDE 12.5 MG/1
CAPSULE, GELATIN COATED ORAL
Qty: 30 CAPSULE | Refills: 0 | Status: SHIPPED | OUTPATIENT
Start: 2020-11-06 | End: 2020-12-09

## 2020-11-06 RX ORDER — POTASSIUM CHLORIDE 20 MEQ/1
TABLET, EXTENDED RELEASE ORAL
Qty: 60 TABLET | Refills: 0 | Status: SHIPPED | OUTPATIENT
Start: 2020-11-06 | End: 2020-12-09

## 2020-11-27 ENCOUNTER — REMOTE DEVICE CLINIC VISIT (OUTPATIENT)
Dept: CARDIOLOGY CLINIC | Facility: CLINIC | Age: 85
End: 2020-11-27
Payer: COMMERCIAL

## 2020-11-27 DIAGNOSIS — Z95.0 PACEMAKER: Primary | ICD-10-CM

## 2020-11-27 PROCEDURE — 93294 REM INTERROG EVL PM/LDLS PM: CPT | Performed by: INTERNAL MEDICINE

## 2020-11-27 PROCEDURE — 93296 REM INTERROG EVL PM/IDS: CPT | Performed by: INTERNAL MEDICINE

## 2020-12-09 DIAGNOSIS — I10 ESSENTIAL (PRIMARY) HYPERTENSION: ICD-10-CM

## 2020-12-09 RX ORDER — POTASSIUM CHLORIDE 20 MEQ/1
TABLET, EXTENDED RELEASE ORAL
Qty: 60 TABLET | Refills: 0 | Status: SHIPPED | OUTPATIENT
Start: 2020-12-09 | End: 2021-01-06

## 2020-12-09 RX ORDER — HYDROCHLOROTHIAZIDE 12.5 MG/1
CAPSULE, GELATIN COATED ORAL
Qty: 30 CAPSULE | Refills: 0 | Status: SHIPPED | OUTPATIENT
Start: 2020-12-09 | End: 2021-01-06

## 2021-01-06 DIAGNOSIS — I10 ESSENTIAL (PRIMARY) HYPERTENSION: ICD-10-CM

## 2021-01-06 RX ORDER — HYDROCHLOROTHIAZIDE 12.5 MG/1
CAPSULE, GELATIN COATED ORAL
Qty: 30 CAPSULE | Refills: 0 | Status: SHIPPED | OUTPATIENT
Start: 2021-01-06 | End: 2021-02-09

## 2021-01-06 RX ORDER — POTASSIUM CHLORIDE 20 MEQ/1
TABLET, EXTENDED RELEASE ORAL
Qty: 60 TABLET | Refills: 0 | Status: SHIPPED | OUTPATIENT
Start: 2021-01-06 | End: 2021-02-09

## 2021-01-19 ENCOUNTER — APPOINTMENT (EMERGENCY)
Dept: CT IMAGING | Facility: HOSPITAL | Age: 86
End: 2021-01-19
Payer: COMMERCIAL

## 2021-01-19 ENCOUNTER — HOSPITAL ENCOUNTER (EMERGENCY)
Facility: HOSPITAL | Age: 86
Discharge: HOME | End: 2021-01-19
Attending: EMERGENCY MEDICINE | Admitting: EMERGENCY MEDICINE
Payer: COMMERCIAL

## 2021-01-19 VITALS
HEART RATE: 66 BPM | RESPIRATION RATE: 18 BRPM | TEMPERATURE: 97.6 F | WEIGHT: 196.65 LBS | DIASTOLIC BLOOD PRESSURE: 81 MMHG | SYSTOLIC BLOOD PRESSURE: 172 MMHG | OXYGEN SATURATION: 98 % | BODY MASS INDEX: 25.94 KG/M2

## 2021-01-19 DIAGNOSIS — K59.00 CONSTIPATION: Primary | ICD-10-CM

## 2021-01-19 PROCEDURE — 99284 EMERGENCY DEPT VISIT MOD MDM: CPT

## 2021-01-19 PROCEDURE — G1004 CDSM NDSC: HCPCS

## 2021-01-19 PROCEDURE — 74176 CT ABD & PELVIS W/O CONTRAST: CPT

## 2021-01-19 PROCEDURE — 99282 EMERGENCY DEPT VISIT SF MDM: CPT | Performed by: EMERGENCY MEDICINE

## 2021-01-19 RX ORDER — POLYETHYLENE GLYCOL 3350 17 G/17G
17 POWDER, FOR SOLUTION ORAL DAILY PRN
Qty: 507 G | Refills: 0 | Status: SHIPPED | OUTPATIENT
Start: 2021-01-19 | End: 2021-06-10

## 2021-01-19 RX ORDER — SODIUM PHOSPHATE, DIBASIC AND SODIUM PHOSPHATE, MONOBASIC 7; 19 G/133ML; G/133ML
1 ENEMA RECTAL ONCE
Status: COMPLETED | OUTPATIENT
Start: 2021-01-19 | End: 2021-01-19

## 2021-01-19 RX ADMIN — SODIUM PHOSPHATE, DIBASIC AND SODIUM PHOSPHATE, MONOBASIC 1 ENEMA: 7; 19 ENEMA RECTAL at 17:21

## 2021-01-19 NOTE — ED PROVIDER NOTES
History  Chief Complaint   Patient presents with    Abdominal Pain     pt reports constipation x2 days, states he has a hx of this but much worse today  This is an 80-year-old male presenting for evaluation constipation the past 2 days, with intermittent mild abdominal cramping when he feels like he has approved  Otherwise denies constant abdominal pain, no nausea, vomiting, diarrhea, dysuria, fevers or chills  States that he has a chronic problem constipation and does take Colace twice a day and eats a high-fiber diet  States that he thought about trying to get up about himself but instead tried a suppository this morning which has not helped  States that he feels like who has really just there but but will come out  He states that he has not been out of the house in about 10 months since the COVID-19 pandemic began and he feels somewhat weak and deconditioned  States that he lives alone  History provided by:  Patient   used: No    Abdominal Pain  Pain location:  LLQ  Pain quality: cramping    Pain radiates to:  Does not radiate  Pain severity:  Mild  Onset quality:  Gradual  Timing:  Intermittent  Chronicity:  New  Associated symptoms: constipation        Prior to Admission Medications   Prescriptions Last Dose Informant Patient Reported? Taking?    B Complex Vitamins (VITAMIN B COMPLEX PO) 1/19/2021 at Unknown time Self Yes Yes   Sig: Take 1 tablet by mouth daily   Cholecalciferol (VITAMIN D PO) 1/19/2021 at Unknown time Self Yes Yes   Sig: Take 1 capsule by mouth daily    Multiple Vitamins-Minerals (MULTIVITAMIN ADULT PO) 1/19/2021 at Unknown time Self Yes Yes   Sig: Take 1 tablet by mouth daily   amLODIPine (NORVASC) 5 mg tablet 1/19/2021 at Unknown time  No Yes   Sig: TAKE 1 TABLET TWICE DAILY   hydrochlorothiazide (MICROZIDE) 12 5 mg capsule 1/19/2021 at Unknown time  No Yes   Sig: TAKE 1 CAPSULE DAILY   metoprolol tartrate (LOPRESSOR) 25 mg tablet 1/19/2021 at Unknown time No Yes   Sig: TAKE 1/2 TABLET EVERY 12 HOURS   potassium chloride (K-DUR,KLOR-CON) 20 mEq tablet 1/19/2021 at Unknown time  No Yes   Sig: TAKE 2 TABLETS DAILY AS DIRECTED      Facility-Administered Medications: None       Past Medical History:   Diagnosis Date    Basal cell carcinoma     Bradycardia     Degenerative joint disease (DJD) of lumbar spine     Hypertension     Kidney stones     Pacemaker        Past Surgical History:   Procedure Laterality Date    CARDIAC PACEMAKER PLACEMENT      CHOLECYSTECTOMY      EYE SURGERY      TONSILLECTOMY         Family History   Problem Relation Age of Onset    Diabetes Mother     Heart disease Mother      I have reviewed and agree with the history as documented  E-Cigarette/Vaping     E-Cigarette/Vaping Substances     Social History     Tobacco Use    Smoking status: Never Smoker    Smokeless tobacco: Never Used   Substance Use Topics    Alcohol use: Not Currently     Comment: occasionally; Per [employer]  No Alcohol use    Drug use: No       Review of Systems   Gastrointestinal: Positive for abdominal pain and constipation  All other systems reviewed and are negative  Physical Exam  Physical Exam  Vitals signs and nursing note reviewed  Constitutional:       General: He is not in acute distress  Appearance: Normal appearance  He is well-developed and normal weight  HENT:      Head: Normocephalic and atraumatic  Right Ear: External ear normal       Left Ear: External ear normal       Nose: Nose normal  No congestion or rhinorrhea  Mouth/Throat:      Mouth: Mucous membranes are moist       Pharynx: Oropharynx is clear  Eyes:      General: No scleral icterus  Right eye: No discharge  Left eye: No discharge  Conjunctiva/sclera: Conjunctivae normal    Neck:      Musculoskeletal: Normal range of motion and neck supple  Cardiovascular:      Rate and Rhythm: Normal rate and regular rhythm  Pulses: Normal pulses  Heart sounds: Normal heart sounds  Pulmonary:      Effort: Pulmonary effort is normal  No respiratory distress  Breath sounds: Normal breath sounds  Abdominal:      General: Abdomen is flat  Bowel sounds are normal       Palpations: Abdomen is soft  Tenderness: There is abdominal tenderness in the left lower quadrant  Genitourinary:     Comments: Fecal impaction w/ moderately hard brown stool  Removed small amount of stool manually  Musculoskeletal: Normal range of motion  General: No swelling  Skin:     General: Skin is warm and dry  Capillary Refill: Capillary refill takes less than 2 seconds  Neurological:      General: No focal deficit present  Mental Status: He is alert and oriented to person, place, and time  Mental status is at baseline  Psychiatric:         Mood and Affect: Mood normal          Behavior: Behavior normal          Vital Signs  ED Triage Vitals [01/19/21 1632]   Temperature Pulse Respirations Blood Pressure SpO2   97 6 °F (36 4 °C) 66 18 (!) 172/81 98 %      Temp Source Heart Rate Source Patient Position - Orthostatic VS BP Location FiO2 (%)   Oral Monitor -- -- --      Pain Score       --           Vitals:    01/19/21 1632   BP: (!) 172/81   Pulse: 66         Visual Acuity      ED Medications  Medications   sodium phosphate-biphosphate (FLEET) enema 1 enema (1 enema Rectal Given 1/19/21 1721)       Diagnostic Studies  Results Reviewed     None                 CT abdomen pelvis wo contrast   Final Result by Bridget Tomlin MD (01/19 1855)      Prominent stool throughout the colon  Mild circumferential thickening of the rectum likely on the basis of a stercoral proctitis  Workstation performed: [id number]                    Procedures  Procedures         ED Course  ED Course as of Jan 20 0053   Tue Jan 19, 2021   1705 Pt mildly tender to LLQ  Rectal exam shows impaction   Attempted manual disimpaction at bedside, did remove small amount of dark brown stool  Will follow up with fleet enema  1820 The patient states he is feeling much better, after Fleet's enema he did have a large bowel movement  CT scan abdomen pending  MDM  Number of Diagnoses or Management Options  Constipation: new and does not require workup  Diagnosis management comments: Ct scan negative  Had large BM  Start on miralax, prn dulcolax, f/u GI if needed  Amount and/or Complexity of Data Reviewed  Tests in the radiology section of CPT®: ordered and reviewed    Risk of Complications, Morbidity, and/or Mortality  Presenting problems: moderate  Diagnostic procedures: moderate  Management options: moderate    Patient Progress  Patient progress: improved      Disposition  Final diagnoses:   Constipation     Time reflects when diagnosis was documented in both MDM as applicable and the Disposition within this note     Time User Action Codes Description Comment    1/19/2021  7:09 PM Jey Cho [K59 [id number]] Constipation       ED Disposition     ED Disposition Condition Date/Time Comment    Discharge Stable Tue Jan 19, 2021  7:09 PM Christa Humphrey discharge to home/self care              Follow-up Information     Follow up With Specialties Details Why Contact Info Additional [facility] [place] [facility] In 3 days As needed [facility] [id number] [facility] [place], [address] [place] [address], [phone]    Vianney Ruano MD Family Medicine In 3 days  [address] [place] [address]  [phone]             Discharge Medication List as of 1/19/2021  7:11 PM      START taking these medications    Details   bisacodyl (DULCOLAX) 5 mg EC tablet Take 1 tablet (5 mg total) by mouth daily as needed for constipation, Starting Tue 1/19/2021, Normal polyethylene glycol (GLYCOLAX) 17 GM/SCOOP powder Take 17 g by mouth daily as needed (constipation), Starting Tue 1/19/2021, Normal         CONTINUE these medications which have NOT CHANGED    Details   amLODIPine (NORVASC) 5 mg tablet TAKE 1 TABLET TWICE DAILY, Normal      B Complex Vitamins (VITAMIN B COMPLEX PO) Take 1 tablet by mouth daily, Starting Thu 7/3/2014, Historical Med      Cholecalciferol (VITAMIN D PO) Take 1 capsule by mouth daily , Historical Med      hydrochlorothiazide (MICROZIDE) 12 5 mg capsule TAKE 1 CAPSULE DAILY, Normal      metoprolol tartrate (LOPRESSOR) 25 mg tablet TAKE 1/2 TABLET EVERY 12 HOURS, Normal      Multiple Vitamins-Minerals (MULTIVITAMIN ADULT PO) Take 1 tablet by mouth daily, Starting Thu 7/3/2014, Historical Med      potassium chloride (K-DUR,KLOR-CON) 20 mEq tablet TAKE 2 TABLETS DAILY AS DIRECTED, Normal           No discharge procedures on file      [address] Review     None          ED Provider  Electronically Signed by           Emma Brower DO  01/20/21 4032

## 2021-01-21 ENCOUNTER — VBI (OUTPATIENT)
Dept: FAMILY MEDICINE CLINIC | Facility: CLINIC | Age: 86
End: 2021-01-21

## 2021-01-21 NOTE — TELEPHONE ENCOUNTER
Pineda Klinefelter    ED Visit Information     Ed visit date: 1/19/21  Diagnosis Description: Constipation  In Network? Yes [facility]  Discharge status: Home  Discharged with meds ? Yes  Number of ED visits to date: 1  ED Severity:N/A     Outreach Information    Outreach successful: Yes 1  Date letter mailed: 0  Date Finalized:1/21/20    Care Coordination    Follow up appointment with pcp: no Patient declined at this time   Transportation issues ? No    [employer] type: 3 Day Outreach  Emergent necessity warranted by diagnosis: No  [facility] PCP: Yes  Transportation: Self Transport  Called PCP first?: No  Feels able to call PCP for urgent problems ?: Yes  Understands what emergencies can be handled by PCP ?: Yes  Ever any problems getting appointment with PCP for minor emergency/urgency problems?: No  Practice Contacted Patient ?: No  Pt had ED follow up with pcp/staff ?: No    Seen for follow-up out of network ?: No  Reason Patient went to ED instead of Urgent Care or PCP?: Perceived Severity of Illness  Urgent care Education?: Yes  01/21/2021 01:03 PM Phone (VBI) Ignacio Perez (Self) [phone] (H) Remove  By [address] communication with patient regarding recent ED visit on 1/19/21  Patient stated that he is doing much better    Patient declined PCP follow-up at this time, patient states he is taking miralax and that seems to be helping  Patient does not meet OPCM criteria  Patient is aware of PCP after hour's on-call service, education not given  Patient was unaware of her nearest [place] [address] urgent care facility, education given  Patient does feel comfortable contacting PCP office for medical advice and does not have issues with getting a [address] [facility] or next day appointment

## 2021-01-26 ENCOUNTER — VBI (OUTPATIENT)
Dept: ADMINISTRATIVE | Facility: OTHER | Age: 86
End: 2021-01-26

## 2021-01-26 NOTE — TELEPHONE ENCOUNTER
Personal communication with patient regarding a My Chart issue he is currently experiencing  Patient and I were in communication regarding his most recent ED visit on 1/19/21  Patient states he is currently having some issues with his My Chart and he is now locked out  Patient states he has tried to do everything MY Chart has asked him to do so he can access his chart and he still is not having any luck  He states he was on hold with My Chart but no one answers his call, he also tried to put in a new code and he still cannot access his account  I will follow up with management to see what is the next step to help patient  I will also follow up with patient tomorrow with any updates

## 2021-01-29 NOTE — TELEPHONE ENCOUNTER
I received a voicemail from Mr Zaida Lopez stating he still has not heard from anyone regarding his My Chart  Patient states due to not having access he was unable to register for his Covid-19 vaccination (called PCP and was able to schedule) and he is also unable to complete any follow up care through My Chart  Patient is a 80year old who lives alone and does not like to go out because of Covid-19  Patient states 1/19/21 was the first time he went out since the pandemic and that was due to medical reasons  Email was sent for a follow up on patients My Chart

## 2021-02-09 DIAGNOSIS — I10 ESSENTIAL (PRIMARY) HYPERTENSION: ICD-10-CM

## 2021-02-09 RX ORDER — POTASSIUM CHLORIDE 20 MEQ/1
TABLET, EXTENDED RELEASE ORAL
Qty: 60 TABLET | Refills: 0 | Status: SHIPPED | OUTPATIENT
Start: 2021-02-09 | End: 2021-03-08

## 2021-02-09 RX ORDER — HYDROCHLOROTHIAZIDE 12.5 MG/1
CAPSULE, GELATIN COATED ORAL
Qty: 30 CAPSULE | Refills: 0 | Status: SHIPPED | OUTPATIENT
Start: 2021-02-09 | End: 2021-03-08

## 2021-02-11 DIAGNOSIS — Z23 ENCOUNTER FOR IMMUNIZATION: ICD-10-CM

## 2021-02-12 ENCOUNTER — IMMUNIZATIONS (OUTPATIENT)
Dept: FAMILY MEDICINE CLINIC | Facility: HOSPITAL | Age: 86
End: 2021-02-12

## 2021-02-12 DIAGNOSIS — Z23 ENCOUNTER FOR IMMUNIZATION: Primary | ICD-10-CM

## 2021-02-12 PROCEDURE — 0001A SARS-COV-2 / COVID-19 MRNA VACCINE (PFIZER-BIONTECH) 30 MCG: CPT

## 2021-02-12 PROCEDURE — 91300 SARS-COV-2 / COVID-19 MRNA VACCINE (PFIZER-BIONTECH) 30 MCG: CPT

## 2021-02-25 ENCOUNTER — TELEPHONE (OUTPATIENT)
Dept: FAMILY MEDICINE CLINIC | Facility: CLINIC | Age: 86
End: 2021-02-25

## 2021-02-25 NOTE — TELEPHONE ENCOUNTER
Called pt- N/A, ANTHONY to for pt to please call back and schedule his Annual Wellness Exam   Last AWV was on 07/09/2019

## 2021-03-04 ENCOUNTER — IMMUNIZATIONS (OUTPATIENT)
Dept: FAMILY MEDICINE CLINIC | Facility: HOSPITAL | Age: 86
End: 2021-03-04

## 2021-03-04 DIAGNOSIS — Z23 ENCOUNTER FOR IMMUNIZATION: Primary | ICD-10-CM

## 2021-03-04 PROCEDURE — 0002A SARS-COV-2 / COVID-19 MRNA VACCINE (PFIZER-BIONTECH) 30 MCG: CPT

## 2021-03-04 PROCEDURE — 91300 SARS-COV-2 / COVID-19 MRNA VACCINE (PFIZER-BIONTECH) 30 MCG: CPT

## 2021-03-08 DIAGNOSIS — I47.1 SVT (SUPRAVENTRICULAR TACHYCARDIA) (HCC): ICD-10-CM

## 2021-03-08 DIAGNOSIS — I10 ESSENTIAL (PRIMARY) HYPERTENSION: ICD-10-CM

## 2021-03-08 RX ORDER — HYDROCHLOROTHIAZIDE 12.5 MG/1
CAPSULE, GELATIN COATED ORAL
Qty: 30 CAPSULE | Refills: 0 | Status: SHIPPED | OUTPATIENT
Start: 2021-03-08 | End: 2021-04-08

## 2021-03-08 RX ORDER — POTASSIUM CHLORIDE 20 MEQ/1
TABLET, EXTENDED RELEASE ORAL
Qty: 60 TABLET | Refills: 0 | Status: SHIPPED | OUTPATIENT
Start: 2021-03-08 | End: 2021-04-08

## 2021-03-17 ENCOUNTER — IN-CLINIC DEVICE VISIT (OUTPATIENT)
Dept: CARDIOLOGY CLINIC | Facility: CLINIC | Age: 86
End: 2021-03-17
Payer: COMMERCIAL

## 2021-03-17 DIAGNOSIS — Z95.0 PACEMAKER: Primary | ICD-10-CM

## 2021-03-17 PROCEDURE — 93280 PM DEVICE PROGR EVAL DUAL: CPT | Performed by: INTERNAL MEDICINE

## 2021-03-17 NOTE — PROGRESS NOTES
Results for orders placed or performed in visit on 03/17/21   Cardiac EP device report    Narrative    BSC-DUAL CHAMBER PPM (DDD MODE)/ NOT MRI CONDITIONAL  [facility]  BATTERY ADEQUATE (3 5 YRS)  AP 47%;  66% (CHB/DDD 50)  ALL LEAD PARAMETERS WITHIN NORMAL LIMITS  NO EPISODES  NO PROGRAMMING CHANGES MADE TO DEVICE PARAMETERS  NORMAL DEVICE FUNCTION   PL

## 2021-04-01 ENCOUNTER — OFFICE VISIT (OUTPATIENT)
Dept: FAMILY MEDICINE CLINIC | Facility: CLINIC | Age: 86
End: 2021-04-01
Payer: COMMERCIAL

## 2021-04-01 VITALS
TEMPERATURE: 97.6 F | OXYGEN SATURATION: 94 % | HEIGHT: 73 IN | WEIGHT: 193.8 LBS | DIASTOLIC BLOOD PRESSURE: 80 MMHG | HEART RATE: 50 BPM | RESPIRATION RATE: 15 BRPM | SYSTOLIC BLOOD PRESSURE: 140 MMHG | BODY MASS INDEX: 25.69 KG/M2

## 2021-04-01 DIAGNOSIS — R35.1 NOCTURIA: ICD-10-CM

## 2021-04-01 DIAGNOSIS — I10 ESSENTIAL HYPERTENSION: Primary | ICD-10-CM

## 2021-04-01 PROBLEM — I47.10 SVT (SUPRAVENTRICULAR TACHYCARDIA): Status: ACTIVE | Noted: 2021-04-01

## 2021-04-01 PROBLEM — I47.1 SVT (SUPRAVENTRICULAR TACHYCARDIA) (HCC): Status: ACTIVE | Noted: 2021-04-01

## 2021-04-01 PROCEDURE — 3288F FALL RISK ASSESSMENT DOCD: CPT | Performed by: FAMILY MEDICINE

## 2021-04-01 PROCEDURE — 99214 OFFICE O/P EST MOD 30 MIN: CPT | Performed by: FAMILY MEDICINE

## 2021-04-01 PROCEDURE — G0439 PPPS, SUBSEQ VISIT: HCPCS | Performed by: FAMILY MEDICINE

## 2021-04-01 RX ORDER — TAMSULOSIN HYDROCHLORIDE 0.4 MG/1
0.4 CAPSULE ORAL
Qty: 30 CAPSULE | Refills: 5 | Status: SHIPPED | OUTPATIENT
Start: 2021-04-01 | End: 2021-09-07

## 2021-04-01 NOTE — PATIENT INSTRUCTIONS
Medicare Preventive Visit Patient Instructions  Thank you for completing your Welcome to Medicare Visit or Medicare Annual Wellness Visit today  Your next wellness visit will be due in one year (4/2/2022)  The screening/preventive services that you may require over the next 5-10 years are detailed below  Some tests may not apply to you based off risk factors and/or age  Screening tests ordered at today's visit but not completed yet may show as past due  Also, please note that scanned in results may not display below  Preventive Screenings:  Service Recommendations Previous Testing/Comments   Colorectal Cancer Screening  · Colonoscopy    · Fecal Occult Blood Test (FOBT)/Fecal Immunochemical Test (FIT)  · Fecal DNA/Cologuard Test  · Flexible Sigmoidoscopy Age: 54-65 years old   Colonoscopy: every 10 years (May be performed more frequently if at higher risk)  OR  FOBT/FIT: every 1 year  OR  Cologuard: every 3 years  OR  Sigmoidoscopy: every 5 years  Screening may be recommended earlier than age 48 if at higher risk for colorectal cancer  Also, an individualized decision between you and your healthcare provider will decide whether screening between the ages of 74-80 would be appropriate   Colonoscopy: Not on file  FOBT/FIT: Not on file  Cologuard: Not on file  Sigmoidoscopy: Not on file    Screening Not Indicated     Prostate Cancer Screening Individualized decision between patient and health care provider in men between ages of 53-78   Medicare will cover every 12 months beginning on the day after your 50th birthday PSA: No results in last 5 years     Screening Not Indicated     Hepatitis C Screening Once for adults born between [facility]  More frequently in patients at high risk for Hepatitis C Hep C Antibody: Not on file        Diabetes Screening 1-2 times per year if you're at risk for diabetes or have pre-diabetes Fasting glucose: 98 mg/dL   A1C: No results in last 5 years        Cholesterol Screening Once every 5 years if you don't have a lipid disorder  May order more often based on risk factors  Lipid panel: 07/10/2019    Screening Current      Other Preventive Screenings Covered by Medicare:  1  Abdominal Aortic Aneurysm (AAA) Screening: covered once if your at risk  You're considered to be at risk if you have a family history of AAA or a male between the age of 73-68 who smoking at least 100 cigarettes in your lifetime  2  Lung Cancer Screening: covers low dose CT scan once per year if you meet all of the following conditions: (1) Age 50-69; (2) No signs or symptoms of lung cancer; (3) Current smoker or have quit smoking within the last 15 years; (4) You have a tobacco smoking history of at least 30 pack years (packs per day x number of years you smoked); (5) You get a written order from a healthcare provider  3  Glaucoma Screening: covered annually if you're considered high risk: (1) You have diabetes OR (2) Family history of glaucoma OR (3)  aged 48 and older OR (3)  American aged 72 and older  3  Osteoporosis Screening: covered every 2 years if you meet one of the following conditions: (1) Have a vertebral abnormality; (2) On glucocorticoid therapy for more than 3 months; (3) Have primary hyperparathyroidism; (4) On osteoporosis medications and need to assess response to drug therapy  5  HIV Screening: covered annually if you're between the age of 12-76  Also covered annually if you are younger than 13 and older than 72 with risk factors for HIV infection  For pregnant patients, it is covered up to 3 times per pregnancy      Immunizations:  Immunization Recommendations   Influenza Vaccine Annual influenza vaccination during flu season is recommended for all persons aged >= 6 months who do not have contraindications   Pneumococcal Vaccine (Prevnar and Pneumovax)  * Prevnar = PCV13  * Pneumovax = PPSV23 Adults 25-60 years old: 1-3 doses may be recommended based on certain risk factors  Adults 72 years old: Prevnar (PCV13) vaccine recommended followed by Pneumovax (PPSV23) vaccine  If already received PPSV23 since turning 65, then PCV13 recommended at least one year after PPSV23 dose  Hepatitis B Vaccine 3 dose series if at intermediate or high risk (ex: diabetes, end stage renal disease, liver disease)   Tetanus (Td) Vaccine - COST NOT COVERED BY MEDICARE PART B Following completion of primary series, a booster dose should be given every 10 years to maintain immunity against tetanus  Td may also be given as tetanus wound prophylaxis  Tdap Vaccine - COST NOT COVERED BY MEDICARE PART B Recommended at least once for all adults  For pregnant patients, recommended with each pregnancy  Shingles Vaccine (Shingrix) - COST NOT COVERED BY MEDICARE PART B  2 shot series recommended in those aged 48 and above     Health Maintenance Due:  There are no preventive care reminders to display for this patient  Immunizations Due:  There are no preventive care reminders to display for this patient  Advance Directives   What are advance directives? Advance directives are legal documents that state your wishes and plans for medical care  These plans are made ahead of time in case you lose your ability to make decisions for yourself  Advance directives can apply to any medical decision, such as the treatments you want, and if you want to donate organs  What are the types of advance directives? There are many types of advance directives, and each state has rules about how to use them  You may choose a combination of any of the following:  · Living will: This is a written record of the treatment you want  You can also choose which treatments you do not want, which to limit, and which to stop at a certain time  This includes surgery, medicine, IV fluid, and tube feedings  · Durable power of  for healthcare [facility]):   This is a written record that states who you want to make healthcare choices for you when you are unable to make them for yourself  This person, called a proxy, is usually a family member or a friend  You may choose more than 1 proxy  · Do not resuscitate (DNR) order:  A DNR order is used in case your heart stops beating or you stop breathing  It is a request not to have certain forms of treatment, such as CPR  A DNR order may be included in other types of advance directives  · Medical directive: This covers the care that you want if you are in a coma, near death, or unable to make decisions for yourself  You can list the treatments you want for each condition  Treatment may include pain medicine, surgery, blood transfusions, dialysis, IV or tube feedings, and a ventilator (breathing machine)  · Values history: This document has questions about your views, beliefs, and how you feel and think about life  This information can help others choose the care that you would choose  Why are advance directives important? An advance directive helps you control your care  Although spoken wishes may be used, it is better to have your wishes written down  Spoken wishes can be misunderstood, or not followed  Treatments may be given even if you do not want them  An advance directive may make it easier for your family to make difficult choices about your care  Fall Prevention    Fall prevention  includes ways to make your home and other areas safer  It also includes ways you can move more carefully to prevent a fall  Health conditions that cause changes in your blood pressure, vision, or muscle strength and coordination may increase your risk for falls  Medicines may also increase your risk for falls if they make you dizzy, weak, or sleepy  Fall prevention tips:   · Stand or sit up slowly  · Use assistive devices as directed  · Wear shoes that fit well and have soles that   · Wear a personal alarm  · Stay active  · Manage your medical conditions      Home Safety Tips:  · Add items to prevent falls in the bathroom  · Keep paths clear  · Install bright lights in your home  · Keep items you use often on shelves within reach  · Paint or place reflective tape on the edges of your stairs  Weight Management   Why it is important to manage your weight:  Being overweight increases your risk of health conditions such as heart disease, high blood pressure, type 2 diabetes, and certain types of cancer  It can also increase your risk for osteoarthritis, sleep apnea, and other respiratory problems  Aim for a slow, steady weight loss  Even a small amount of weight loss can lower your risk of health problems  How to lose weight safely:  A safe and healthy way to lose weight is to eat fewer calories and get regular exercise  You can lose up about 1 pound a week by decreasing the number of calories you eat by 500 calories each day  Healthy meal plan for weight management:  A healthy meal plan includes a variety of foods, contains fewer calories, and helps you stay healthy  A healthy meal plan includes the following:  · Eat whole-grain foods more often  A healthy meal plan should contain fiber  Fiber is the part of grains, fruits, and vegetables that is not broken down by your body  Whole-grain foods are healthy and provide extra fiber in your diet  Some examples of whole-grain foods are whole-wheat breads and pastas, oatmeal, brown rice, and bulgur  · Eat a variety of vegetables every day  Include dark, leafy greens such as spinach, kale, emeterio greens, and mustard greens  Eat yellow and orange vegetables such as carrots, sweet potatoes, and winter squash  · Eat a variety of fruits every day  Choose fresh or canned fruit (canned in its own juice or light syrup) instead of juice  Fruit juice has very little or no fiber  · Eat low-fat dairy foods  Drink fat-free (skim) milk or 1% milk  Eat fat-free yogurt and low-fat cottage cheese   Try low-fat cheeses such as mozzarella and other reduced-fat cheeses  · Choose meat and other protein foods that are low in fat  Choose beans or other legumes such as split peas or lentils  Choose fish, skinless poultry (chicken or turkey), or lean cuts of red meat (beef or pork)  Before you cook meat or poultry, cut off any visible fat  · Use less fat and oil  Try baking foods instead of frying them  Add less fat, such as margarine, sour cream, regular salad dressing and mayonnaise to foods  Eat fewer high-fat foods  Some examples of high-fat foods include french fries, doughnuts, ice cream, and cakes  · Eat fewer sweets  Limit foods and drinks that are high in sugar  This includes candy, cookies, regular soda, and sweetened drinks  Exercise:  Exercise at least 30 minutes per day on most days of the week  Some examples of exercise include walking, biking, dancing, and swimming  You can also fit in more physical activity by taking the stairs instead of the elevator or parking farther away from stores  Ask your healthcare provider about the best exercise plan for you  © Copyright [employer] 2018 Information is for End User's use only and may not be sold, redistributed or otherwise used for commercial purposes   All illustrations and images included in CareNotes® are the copyrighted property of A D A M , Inc  or [address] [place] [address] [employer]

## 2021-04-01 NOTE — PROGRESS NOTES
Assessment and Plan:     Problem List Items Addressed This Visit     None           Preventive health issues were discussed with patient, and age appropriate screening tests were ordered as noted in patient's After Visit Summary  Personalized health advice and appropriate referrals for health education or preventive services given if needed, as noted in patient's After Visit Summary  History of Present Illness:     Patient presents for Medicare Annual Wellness visit    Patient Care Team:  Cindi Mac MD as PCP - General  MD José Mejia MD     Problem List:     Patient Active Problem List   Diagnosis    Aortic valve disease    Cervical radiculopathy    Hypertension    LVH (left ventricular hypertrophy)    Other malignant neoplasm of unspecified site    Spinal stenosis    Obsessive compulsive disorder    Dysthymic disorder    Gait disturbance    Dry eye    Laceration of scalp    Balanitis      Past Medical and Surgical History:     Past Medical History:   Diagnosis Date    Basal cell carcinoma     Bradycardia     Degenerative joint disease (DJD) of lumbar spine     Hypertension     Kidney stones     Pacemaker      Past Surgical History:   Procedure Laterality Date    CARDIAC PACEMAKER PLACEMENT      CHOLECYSTECTOMY      EYE SURGERY      TONSILLECTOMY        Family History:     Family History   Problem Relation Age of Onset    Diabetes Mother     Heart disease Mother       Social History:     E-Cigarette/Vaping    E-Cigarette Use Never User      E-Cigarette/Vaping Substances    Nicotine No     THC No     CBD No     Flavoring No     Other No     Unknown No      Social History     Socioeconomic History    Marital status:       Spouse name: None    Number of children: None    Years of education: None    Highest education level: None   Occupational History    Occupation: Retired   Social Needs    Financial resource strain: None    Food insecurity Worry: None     Inability: None    Transportation needs     Medical: None     Non-medical: None   Tobacco Use    Smoking status: Never Smoker    Smokeless tobacco: Never Used   Substance and Sexual Activity    Alcohol use: Not Currently     Comment: occasionally; Per [employer]  No Alcohol use    Drug use: No    Sexual activity: None   Lifestyle    Physical activity     Days per week: None     Minutes per session: None    Stress: None   Relationships    Social connections     Talks on phone: None     Gets together: None     Attends Hinduism service: None     Active member of club or organization: None     Attends meetings of clubs or organizations: None     Relationship status: None    Intimate partner violence     Fear of current or ex partner: None     Emotionally abused: None     Physically abused: None     Forced sexual activity: None   Other Topics Concern    None   Social History Narrative    None      Medications and Allergies:     Current Outpatient Medications   Medication Sig Dispense Refill    amLODIPine (NORVASC) 5 mg tablet TAKE 1 TABLET TWICE DAILY 60 tablet 10    B Complex Vitamins (VITAMIN B COMPLEX PO) Take 1 tablet by mouth daily      bisacodyl (DULCOLAX) 5 mg EC tablet Take 1 tablet (5 mg total) by mouth daily as needed for constipation 10 tablet 0    Cholecalciferol (VITAMIN D PO) Take 1 capsule by mouth daily       hydrochlorothiazide (MICROZIDE) 12 5 mg capsule TAKE 1 CAPSULE DAILY 30 capsule 0    metoprolol tartrate (LOPRESSOR) 25 mg tablet TAKE 1/2 TABLET EVERY 12 HOURS 30 tablet 11    Multiple Vitamins-Minerals (MULTIVITAMIN ADULT PO) Take 1 tablet by mouth daily      polyethylene glycol (GLYCOLAX) 17 GM/SCOOP powder Take 17 g by mouth daily as needed (constipation) 507 g 0    potassium chloride (K-DUR,KLOR-CON) 20 mEq tablet TAKE 2 TABLETS DAILY AS DIRECTED 60 tablet 0     No current facility-administered medications for this visit        No Known Allergies Immunizations:     Immunization History   Administered Date(s) Administered    INFLUENZA 09/20/2014, 10/14/2016, 10/16/2017, 08/19/2018, 10/07/2019    Influenza Split High Dose Preservative Free IM 09/20/2014, 10/14/2016, 10/16/2017    Influenza, seasonal, injectable 11/04/2013    Influenza, seasonal, injectable, preservative free 10/07/2019    Pneumococcal Conjugate 13-Valent 04/16/2015    Pneumococcal Polysaccharide PPV23 10/01/2009    SARS-CoV-2 / COVID-19 mRNA IM ([facility]) 02/12/2021, 03/04/2021    Td (adult), adsorbed 08/01/2010    Tdap 04/16/2015    Zoster Vaccine Recombinant 07/09/2019, 09/07/2019    influenza, trivalent, adjuvanted 09/14/2019      Health Maintenance: There are no preventive care reminders to display for this patient  Topic Date Due    Influenza Vaccine (1) 09/01/2020      Medicare Health Risk Assessment:     Temp 97 6 °F (36 4 °C) (Temporal)   Resp 15   Ht 6' 1" (1 854 m)   Wt 87 9 kg (193 lb 12 8 oz)   BMI 25 57 kg/m²      Polly Hernadez is here for his Subsequent Wellness visit  Health Risk Assessment:   Patient rates overall health as good  Patient feels that their physical health rating is slightly worse  Patient is satisfied with their life  Eyesight was rated as slightly worse  Hearing was rated as same  Patient feels that their emotional and mental health rating is slightly worse  Patients states they are sometimes angry  Patient states they are often unusually tired/fatigued  Pain experienced in the last 7 days has been some  Patient's pain rating has been 2/10  Patient states that he has experienced no weight loss or gain in last 6 months  Depression Screening:   PHQ-2 Score: 0  PHQ-9 Score: 2      Fall Risk Screening:    In the past year, patient has experienced: history of falling in past year    Number of falls: 2 or more  Injured during fall?: Yes    Feels unsteady when standing or walking?: Yes    Worried about falling?: Yes      Home Safety:  Patient has trouble with stairs inside or outside of their home  Patient has working smoke alarms and has working carbon monoxide detector  Home safety hazards include: none  Nutrition:   Current diet is Regular  Medications:   Patient is currently taking over-the-counter supplements  OTC medications include: see medication list  Patient is able to manage medications  Activities of Daily Living (ADLs)/Instrumental Activities of Daily Living (IADLs):   Walk and transfer into and out of bed and chair?: Yes  Dress and groom yourself?: Yes    Bathe or shower yourself?: Yes    Feed yourself? Yes  Do your laundry/housekeeping?: Yes  Manage your money, pay your bills and track your expenses?: Yes  Make your own meals?: Yes    Do your own shopping?: Yes    Previous Hospitalizations:   Any hospitalizations or ED visits within the last 12 months?: No      Advance Care Planning:   Living will: Yes    Durable POA for healthcare: Yes    Advanced directive: Yes      PREVENTIVE SCREENINGS      Cardiovascular Screening:    General: Screening Current      Diabetes Screening:     General: Risks and Benefits Discussed    Due for: Blood Glucose      Colorectal Cancer Screening:     General: Screening Not Indicated      Prostate Cancer Screening:    General: Screening Not Indicated      Lung Cancer Screening:     General: Screening Not Indicated    Screening, Brief Intervention, and Referral to Treatment (SBIRT)    Screening  Typical number of drinks in a day: 0  Typical number of drinks in a week: 0  Interpretation: Low risk drinking behavior  Single Item Drug Screening:  How often have you used an illegal drug (including marijuana) or a prescription medication for non-medical reasons in the past year? never    Single Item Drug Screen Score: 0  Interpretation: Negative screen for possible drug use disorder    Brief Intervention  Alcohol & drug use screenings were reviewed   No concerns regarding substance use disorder identified         Albert Ellis MD

## 2021-04-01 NOTE — ASSESSMENT & PLAN NOTE
Nocturia  Patient will obtain blood work as ordered  He was given prescription to initiate Flomax 0 4 milligrams in the evenings in hopes that this will help with this frequent urination  He is aware that medication may cause some  Feelings of being off balance and should take precautions when going to the bathroom at night  Patient will call if he has any significant side effects

## 2021-04-01 NOTE — PROGRESS NOTES
FAMILY PRACTICE OFFICE VISIT       NAME: Adam Storm  AGE: 80 y o  SEX: male       : 1935        MRN: [id number]    DATE: 2021  TIME: 12:40 PM    Assessment and Plan     Problem List Items Addressed This Visit        Cardiovascular and Mediastinum    Hypertension - Primary      Hypertension  Patient blood pressure is stable at this time he will continue with current dose of amlodipine  He will obtain blood work as ordered for further evaluation         Relevant Orders    CBC    Comprehensive metabolic panel    Lipid panel    TSH, 3rd generation    PSA, Total Screen       Other    Nocturia      Nocturia  Patient will obtain blood work as ordered  He was given prescription to initiate Flomax 0 4 milligrams in the evenings in hopes that this will help with this frequent urination  He is aware that medication may cause some  Feelings of being off balance and should take precautions when going to the bathroom at night  Patient will call if he has any significant side effects  Relevant Medications    tamsulosin (FLOMAX) 0 4 mg              Chief Complaint   No chief complaint on file  History of Present Illness      Patient in the office to discuss chronic medical condition  He denies any recent illness and has received his COVID vaccinations  Patient's biggest complaint at this time is of frequent urinating every 2 hours throughout the day and night  He has decreased urine flow but denies any dysuria  Overall he feels he is slowing down due to his age in terms of his stamina and his balance which sometimes makes ambulating difficult  When he is feeling off balance he uses a walker with wheels for stability  Mentally he feels he is sharp but does become forgetful and needs to write things down  He has not seen any specialists in the past year      Review of Systems   Review of Systems   Constitutional: Positive for fatigue  HENT: Negative  Respiratory: Negative  Cardiovascular: Negative  Gastrointestinal: Negative  Genitourinary:        As per HPI   Musculoskeletal: Positive for arthralgias and gait problem  Neurological:        As per HPI   Psychiatric/Behavioral: Negative  Active Problem List     Patient Active Problem List   Diagnosis    Aortic valve disease    Cervical radiculopathy    Hypertension    LVH (left ventricular hypertrophy)    Other malignant neoplasm of unspecified site    Spinal stenosis    Obsessive compulsive disorder    Dysthymic disorder    Gait disturbance    Dry eye    Laceration of scalp    Balanitis    SVT (supraventricular tachycardia) (HCC)    Nocturia       Past Medical History:  Past Medical History:   Diagnosis Date    Basal cell carcinoma     Bradycardia     Degenerative joint disease (DJD) of lumbar spine     Hypertension     Kidney stones     Pacemaker        Past Surgical History:  Past Surgical History:   Procedure Laterality Date    CARDIAC PACEMAKER PLACEMENT      CHOLECYSTECTOMY      EYE SURGERY      TONSILLECTOMY         Family History:  Family History   Problem Relation Age of Onset    Diabetes Mother     Heart disease Mother        Social History:  Social History     Socioeconomic History    Marital status:       Spouse name: Not on file    Number of children: Not on file    Years of education: Not on file    Highest education level: Not on file   Occupational History    Occupation: Retired   Social Needs    Financial resource strain: Not on file    Food insecurity     Worry: Not on file     Inability: Not on file   [employer] needs     Medical: Not on file     Non-medical: Not on file   Tobacco Use    Smoking status: Never Smoker    Smokeless tobacco: Never Used   Substance and Sexual Activity    Alcohol use: Not Currently     Comment: occasionally; Per [employer]  No Alcohol use    Drug use: No    Sexual activity: Not on file   Lifestyle    Physical activity Days per week: Not on file     Minutes per session: Not on file    Stress: Not on file   Relationships    Social connections     Talks on phone: Not on file     Gets together: Not on file     Attends Episcopal service: Not on file     Active member of club or organization: Not on file     Attends meetings of clubs or organizations: Not on file     Relationship status: Not on file    Intimate partner violence     Fear of current or ex partner: Not on file     Emotionally abused: Not on file     Physically abused: Not on file     Forced sexual activity: Not on file   Other Topics Concern    Not on file   Social History Narrative    Not on file       Objective     Vitals:    04/01/21 1058   BP: 140/80   Pulse: (!) 50   Resp: 15   Temp: 97 6 °F (36 4 °C)   SpO2: 94%     Wt Readings from Last 3 Encounters:   04/01/21 87 9 kg (193 lb 12 8 oz)   01/19/21 89 2 kg (196 lb 10 4 oz)   01/02/20 94 kg (207 lb 3 2 oz)       Physical Exam  Constitutional:       General: He is not in acute distress  Appearance: Normal appearance  He is not ill-appearing  HENT:      Head: Normocephalic and atraumatic  Eyes:      General:         Right eye: No discharge  Left eye: No discharge  Extraocular Movements: Extraocular movements intact  Conjunctiva/sclera: Conjunctivae normal       Pupils: Pupils are equal, round, and reactive to light  Cardiovascular:      Rate and Rhythm: Normal rate and regular rhythm  Heart sounds: Normal heart sounds  No murmur  Pulmonary:      Effort: Pulmonary effort is normal  No respiratory distress  Breath sounds: Normal breath sounds  No wheezing or rhonchi  Abdominal:      General: Abdomen is flat  Bowel sounds are normal  There is no distension  Palpations: Abdomen is soft  Tenderness: There is no abdominal tenderness  There is no guarding or rebound  Musculoskeletal:      Right lower leg: No edema  Left lower leg: No edema        Comments: Patient ambulating with a walking cane   Neurological:      General: No focal deficit present  Mental Status: He is alert and oriented to person, place, and time  Mental status is at baseline  Psychiatric:         Mood and Affect: Mood normal          Behavior: Behavior normal          Thought Content:  Thought content normal          Judgment: Judgment normal          Pertinent Laboratory/Diagnostic Studies:  Lab Results   Component Value Date    GLUCOSE 114 04/08/2015    BUN 16 07/10/2019    CREATININE 0 70 07/10/2019    CALCIUM 8 6 07/10/2019     04/08/2015    K 3 6 07/10/2019    CO2 26 07/10/2019     07/10/2019     Lab Results   Component Value Date    ALT 24 07/10/2019    AST 20 07/10/2019    ALKPHOS 57 07/10/2019    BILITOT 0 78 04/07/2015       Lab Results   Component Value Date    WBC 12 56 (H) 07/10/2019    HGB 12 8 07/10/2019    HCT 38 9 07/10/2019     (H) 07/10/2019     07/10/2019       No results found for: TSH    Lab Results   Component Value Date    CHOL 132 03/24/2014     Lab Results   Component Value Date    TRIG 63 07/10/2019     Lab Results   Component Value Date    HDL 49 07/10/2019     Lab Results   Component Value Date    LDLCALC 81 07/10/2019     No results found for: HGBA1C    Results for orders placed or performed in visit on 07/10/19   CBC   Result Value Ref Range    WBC 12 56 (H) 4 31 - 10 16 Thousand/uL    RBC 3 85 (L) 3 88 - 5 62 Million/uL    Hemoglobin 12 8 12 0 - 17 0 g/dL    Hematocrit 38 9 36 5 - 49 3 %     (H) 82 - 98 fL    MCH 33 2 26 8 - 34 3 pg    MCHC 32 9 31 4 - 37 4 g/dL    RDW 13 4 11 6 - 15 1 %    Platelets 760 041 - 216 Thousands/uL    MPV 11 4 8 9 - 12 7 fL   Comprehensive metabolic panel   Result Value Ref Range    Sodium 138 136 - 145 mmol/L    Potassium 3 6 3 5 - 5 3 mmol/L    Chloride 106 100 - 108 mmol/L    CO2 26 21 - 32 mmol/L    ANION GAP 6 4 - 13 mmol/L    BUN 16 5 - 25 mg/dL    Creatinine 0 70 0 60 - 1 30 mg/dL    Glucose, Fasting 98 65 - 99 mg/dL    Calcium 8 6 8 3 - 10 1 mg/dL    AST 20 5 - 45 U/L    ALT 24 12 - 78 U/L    Alkaline Phosphatase 57 46 - 116 U/L    Total Protein 6 9 6 4 - 8 2 g/dL    Albumin 3 5 3 5 - 5 0 g/dL    Total Bilirubin 0 97 0 20 - 1 00 mg/dL    eGFR 87 ml/min/1 73sq m   TSH, 3rd generation   Result Value Ref Range    TSH 3RD GENERATON 2 430 0 358 - 3 740 uIU/mL   Lipid panel   Result Value Ref Range    Cholesterol 143 50 - 200 mg/dL    Triglycerides 63 <=150 mg/dL    HDL, Direct 49 40 - 60 mg/dL    LDL Calculated 81 0 - 100 mg/dL    Non-HDL-Chol (CHOL-HDL) 94 mg/dl       Orders Placed This Encounter   Procedures    CBC    Comprehensive metabolic panel    Lipid panel    TSH, 3rd generation    PSA, Total Screen       ALLERGIES:  No Known Allergies    Current Medications     Current Outpatient Medications   Medication Sig Dispense Refill    amLODIPine (NORVASC) 5 mg tablet TAKE 1 TABLET TWICE DAILY 60 tablet 10    B Complex Vitamins (VITAMIN B COMPLEX PO) Take 1 tablet by mouth daily      bisacodyl (DULCOLAX) 5 mg EC tablet Take 1 tablet (5 mg total) by mouth daily as needed for constipation 10 tablet 0    Cholecalciferol (VITAMIN D PO) Take 1 capsule by mouth daily       hydrochlorothiazide (MICROZIDE) 12 5 mg capsule TAKE 1 CAPSULE DAILY 30 capsule 0    metoprolol tartrate (LOPRESSOR) 25 mg tablet TAKE 1/2 TABLET EVERY 12 HOURS 30 tablet 11    Multiple Vitamins-Minerals (MULTIVITAMIN ADULT PO) Take 1 tablet by mouth daily      polyethylene glycol (GLYCOLAX) 17 GM/SCOOP powder Take 17 g by mouth daily as needed (constipation) 507 g 0    potassium chloride (K-DUR,KLOR-CON) 20 mEq tablet TAKE 2 TABLETS DAILY AS DIRECTED 60 tablet 0    tamsulosin (FLOMAX) 0 4 mg Take 1 capsule (0 4 mg total) by mouth daily at bedtime 30 capsule 5     No current facility-administered medications for this visit            Health Maintenance     Health Maintenance   Topic Date Due    SLP PLAN OF CARE  Never done   [facility] Annual Wellness Visit (AWV)  07/09/2020    Depression Remission PHQ  07/09/2020    BMI: Followup Plan  12/09/2020    Influenza Vaccine (1) 06/30/2021 (Originally 9/1/2020)    Fall Risk  04/01/2022    BMI: Adult  04/01/2022    DTaP,Tdap,and Td Vaccines (2 - Td) 04/16/2025    Pneumococcal Vaccine: 65+ Years  Completed    COVID-19 Vaccine  Completed    HIB Vaccine  Aged Out    Hepatitis B Vaccine  Aged Out    IPV Vaccine  Aged Out    Hepatitis A Vaccine  Aged Out    Meningococcal ACWY Vaccine  Aged Out    HPV Vaccine  Aged Out     Immunization History   Administered Date(s) Administered    INFLUENZA 09/20/2014, 10/14/2016, 10/16/2017, 08/19/2018, 10/07/2019    Influenza Split High Dose Preservative Free IM 09/20/2014, 10/14/2016, 10/16/2017    Influenza, seasonal, injectable 11/04/2013    Influenza, seasonal, injectable, preservative free 10/07/2019    Pneumococcal Conjugate 13-Valent 04/16/2015    Pneumococcal Polysaccharide PPV23 10/01/2009    SARS-CoV-2 / COVID-19 mRNA IM ([facility]) 02/12/2021, 03/04/2021    Td (adult), adsorbed 08/01/2010    Tdap 04/16/2015    Zoster Vaccine Recombinant 07/09/2019, 09/07/2019    influenza, trivalent, adjuvanted 25/08/6618       Greyson Kelly MD      Spent 25 minutes with this patient which greater than 50 percent was spent counseling

## 2021-04-01 NOTE — ASSESSMENT & PLAN NOTE
Hypertension  Patient blood pressure is stable at this time he will continue with current dose of amlodipine   He will obtain blood work as ordered for further evaluation

## 2021-04-05 ENCOUNTER — APPOINTMENT (OUTPATIENT)
Dept: LAB | Facility: CLINIC | Age: 86
End: 2021-04-05
Payer: COMMERCIAL

## 2021-04-05 DIAGNOSIS — I10 ESSENTIAL HYPERTENSION: ICD-10-CM

## 2021-04-05 LAB
ALBUMIN SERPL BCP-MCNC: 3.8 G/DL (ref 3.5–5)
ALP SERPL-CCNC: 72 U/L (ref 46–116)
ALT SERPL W P-5'-P-CCNC: 31 U/L (ref 12–78)
ANION GAP SERPL CALCULATED.3IONS-SCNC: 7 MMOL/L (ref 4–13)
AST SERPL W P-5'-P-CCNC: 22 U/L (ref 5–45)
BILIRUB SERPL-MCNC: 0.65 MG/DL (ref 0.2–1)
BUN SERPL-MCNC: 15 MG/DL (ref 5–25)
CALCIUM SERPL-MCNC: 8.9 MG/DL (ref 8.3–10.1)
CHLORIDE SERPL-SCNC: 108 MMOL/L (ref 100–108)
CHOLEST SERPL-MCNC: 135 MG/DL (ref 50–200)
CO2 SERPL-SCNC: 25 MMOL/L (ref 21–32)
CREAT SERPL-MCNC: 0.75 MG/DL (ref 0.6–1.3)
ERYTHROCYTE [DISTWIDTH] IN BLOOD BY AUTOMATED COUNT: 14 % (ref 11.6–15.1)
GFR SERPL CREATININE-BSD FRML MDRD: 84 ML/MIN/1.73SQ M
GLUCOSE P FAST SERPL-MCNC: 100 MG/DL (ref 65–99)
HCT VFR BLD AUTO: 38.3 % (ref 36.5–49.3)
HDLC SERPL-MCNC: 64 MG/DL
HGB BLD-MCNC: 12.8 G/DL (ref 12–17)
LDLC SERPL CALC-MCNC: 61 MG/DL (ref 0–100)
MCH RBC QN AUTO: 33.2 PG (ref 26.8–34.3)
MCHC RBC AUTO-ENTMCNC: 33.4 G/DL (ref 31.4–37.4)
MCV RBC AUTO: 99 FL (ref 82–98)
NONHDLC SERPL-MCNC: 71 MG/DL
PLATELET # BLD AUTO: 202 THOUSANDS/UL (ref 149–390)
PMV BLD AUTO: 11.9 FL (ref 8.9–12.7)
POTASSIUM SERPL-SCNC: 3.6 MMOL/L (ref 3.5–5.3)
PROT SERPL-MCNC: 7.4 G/DL (ref 6.4–8.2)
PSA SERPL-MCNC: 2 NG/ML (ref 0–4)
RBC # BLD AUTO: 3.86 MILLION/UL (ref 3.88–5.62)
SODIUM SERPL-SCNC: 140 MMOL/L (ref 136–145)
TRIGL SERPL-MCNC: 48 MG/DL
TSH SERPL DL<=0.05 MIU/L-ACNC: 3.89 UIU/ML (ref 0.36–3.74)
WBC # BLD AUTO: 6.88 THOUSAND/UL (ref 4.31–10.16)

## 2021-04-05 PROCEDURE — 36415 COLL VENOUS BLD VENIPUNCTURE: CPT

## 2021-04-05 PROCEDURE — 84443 ASSAY THYROID STIM HORMONE: CPT

## 2021-04-05 PROCEDURE — 85027 COMPLETE CBC AUTOMATED: CPT

## 2021-04-05 PROCEDURE — 80061 LIPID PANEL: CPT

## 2021-04-05 PROCEDURE — G0103 PSA SCREENING: HCPCS

## 2021-04-05 PROCEDURE — 80053 COMPREHEN METABOLIC PANEL: CPT

## 2021-04-08 DIAGNOSIS — I10 ESSENTIAL (PRIMARY) HYPERTENSION: ICD-10-CM

## 2021-04-08 RX ORDER — POTASSIUM CHLORIDE 20 MEQ/1
TABLET, EXTENDED RELEASE ORAL
Qty: 60 TABLET | Refills: 0 | Status: SHIPPED | OUTPATIENT
Start: 2021-04-08 | End: 2021-05-12

## 2021-04-08 RX ORDER — HYDROCHLOROTHIAZIDE 12.5 MG/1
CAPSULE, GELATIN COATED ORAL
Qty: 30 CAPSULE | Refills: 0 | Status: SHIPPED | OUTPATIENT
Start: 2021-04-08 | End: 2021-05-12

## 2021-05-12 DIAGNOSIS — I10 ESSENTIAL (PRIMARY) HYPERTENSION: ICD-10-CM

## 2021-05-12 RX ORDER — HYDROCHLOROTHIAZIDE 12.5 MG/1
CAPSULE, GELATIN COATED ORAL
Qty: 30 CAPSULE | Refills: 0 | Status: SHIPPED | OUTPATIENT
Start: 2021-05-12 | End: 2021-06-09

## 2021-05-12 RX ORDER — POTASSIUM CHLORIDE 20 MEQ/1
TABLET, EXTENDED RELEASE ORAL
Qty: 60 TABLET | Refills: 0 | Status: SHIPPED | OUTPATIENT
Start: 2021-05-12 | End: 2021-06-09

## 2021-06-09 DIAGNOSIS — K59.00 CONSTIPATION: ICD-10-CM

## 2021-06-09 DIAGNOSIS — I35.9 AORTIC VALVE DISEASE: ICD-10-CM

## 2021-06-09 DIAGNOSIS — I10 ESSENTIAL (PRIMARY) HYPERTENSION: ICD-10-CM

## 2021-06-09 RX ORDER — HYDROCHLOROTHIAZIDE 12.5 MG/1
CAPSULE, GELATIN COATED ORAL
Qty: 30 CAPSULE | Refills: 0 | Status: SHIPPED | OUTPATIENT
Start: 2021-06-09 | End: 2021-07-08

## 2021-06-09 RX ORDER — POTASSIUM CHLORIDE 20 MEQ/1
TABLET, EXTENDED RELEASE ORAL
Qty: 60 TABLET | Refills: 0 | Status: SHIPPED | OUTPATIENT
Start: 2021-06-09 | End: 2021-07-08

## 2021-06-09 RX ORDER — AMLODIPINE BESYLATE 5 MG/1
TABLET ORAL
Qty: 60 TABLET | Refills: 10 | Status: SHIPPED | OUTPATIENT
Start: 2021-06-09 | End: 2022-06-08

## 2021-06-10 RX ORDER — POLYETHYLENE GLYCOL 3350 17 G/17G
POWDER, FOR SOLUTION ORAL
Qty: 510 G | Refills: 5 | Status: SHIPPED | OUTPATIENT
Start: 2021-06-10 | End: 2021-10-16 | Stop reason: ALTCHOICE

## 2021-06-14 NOTE — PROGRESS NOTES
Cardiology Follow Up    Halina Valle  1935  [phone]  [address] [facility]  [address] [place] [address]  [address] [facility]  [phone]    1  Essential (primary) hypertension  POCT ECG    Echo complete with contrast if indicated   2  Aortic valve disease  Echo complete with contrast if indicated   3  Pacemaker  Echo complete with contrast if indicated   4  SVT (supraventricular tachycardia) (HCC)  Echo complete with contrast if indicated   5  LVH (left ventricular hypertrophy)  Echo complete with contrast if indicated       Interval History:  Patient is here for a follow-up visit  He has a [employer] PPM   His most recent interrogation June 2021  demonstrated an appropriately functioning device with no significant high rate episodes noted  A prior pacer interrogation in November of 2018 suggested the possibility of atrial flutter  A HM after that demonstrated sinus rhythm  An echocardiogram done September 2016 demonstrated preserved LV systolic function with mild LVH and no significant valvular heart disease  On a chest CT scan done in 2018 the patient was noted to have a 4 4 cm ascending aortic aneurysm  Will check a follow-up echocardiogram to assess this  In general however he has been fine  He has had no chest pain or significant dyspnea  EKG today demonstrates sinus rhythm with first-degree AV block with  Inferolateral T-wave changes  Inferior Q-waves are noted and were noted on a prior EKG done September 10, 2018      Patient Active Problem List   Diagnosis    Aortic valve disease    Cervical radiculopathy    Hypertension    LVH (left ventricular hypertrophy)    Other malignant neoplasm of unspecified site    Spinal stenosis    Obsessive compulsive disorder    Dysthymic disorder    Gait disturbance    Dry eye    Laceration of scalp    Balanitis    SVT (supraventricular tachycardia) (HCC)    Nocturia Past Medical History:   Diagnosis Date    Basal cell carcinoma     Bradycardia     Degenerative joint disease (DJD) of lumbar spine     Hypertension     Kidney stones     Pacemaker      Social History     Socioeconomic History    Marital status:      Spouse name: Not on file    Number of children: Not on file    Years of education: Not on file    Highest education level: Not on file   Occupational History    Occupation: Retired   Tobacco Use    Smoking status: Never Smoker    Smokeless tobacco: Never Used   Vaping Use    Vaping Use: Never used   Substance and Sexual Activity    Alcohol use: Not Currently     Comment: occasionally; Per [employer]  No Alcohol use    Drug use: No    Sexual activity: Not on file   Other Topics Concern    Not on file   Social History Narrative    Not on file     Social Determinants of Health     Financial Resource Strain:     Difficulty of Paying Living Expenses:    Food Insecurity:     Worried About [address] in the Last Year:     [employer] in the Last Year:    Transportation Needs:     Lack of Transportation (Medical):      Lack of Transportation (Non-Medical):    Physical Activity:     Days of Exercise per Week:     Minutes of Exercise per Session:    Stress:     Feeling of Stress :    Social Connections:     Frequency of Communication with Friends and Family:     Frequency of Social Gatherings with Friends and Family:     Attends Christian Services:     Active Member of Clubs or Organizations:     Attends Club or Organization Meetings:     Marital Status:    Intimate Partner Violence:     Fear of Current or Ex-Partner:     Emotionally Abused:     Physically Abused:     Sexually Abused:       Family History   Problem Relation Age of Onset    Diabetes Mother     Heart disease Mother      Past Surgical History:   Procedure Laterality Date    CARDIAC PACEMAKER PLACEMENT     [address] [facility] TONSILLECTOMY         Current Outpatient Medications:     amLODIPine (NORVASC) 5 mg tablet, TAKE 1 TABLET TWICE DAILY, Disp: 60 tablet, Rfl: 10    B Complex Vitamins (VITAMIN B COMPLEX PO), Take 1 tablet by mouth daily, Disp: , Rfl:     bisacodyl (DULCOLAX) 5 mg EC tablet, Take 1 tablet (5 mg total) by mouth daily as needed for constipation, Disp: 10 tablet, Rfl: 0    Cholecalciferol (VITAMIN D PO), Take 1 capsule by mouth daily , Disp: , Rfl:     GaviLAX 17 GM/SCOOP powder, TAKE 17 G (1 CAPFUL) BY MOUTH DAILY AS NEEDED (CONSTIPATION) - MIX WITH LIQUID BEFORE TAKING , Disp: 510 g, Rfl: 5    hydrochlorothiazide (MICROZIDE) 12 5 mg capsule, TAKE 1 CAPSULE DAILY, Disp: 30 capsule, Rfl: 0    metoprolol tartrate (LOPRESSOR) 25 mg tablet, TAKE 1/2 TABLET EVERY 12 HOURS, Disp: 30 tablet, Rfl: 11    Multiple Vitamins-Minerals (MULTIVITAMIN ADULT PO), Take 1 tablet by mouth daily, Disp: , Rfl:     potassium chloride (K-DUR,KLOR-CON) 20 mEq tablet, TAKE 2 TABLETS DAILY AS DIRECTED (Patient taking differently: 1 5 tablets daily), Disp: 60 tablet, Rfl: 0    tamsulosin (FLOMAX) 0 4 mg, Take 1 capsule (0 4 mg total) by mouth daily at bedtime, Disp: 30 capsule, Rfl: 5  No Known Allergies    Labs:not applicable  Imaging: No results found  Review of Systems:  Review of Systems   All other systems reviewed and are negative  Physical Exam:  /78 (BP Location: Right arm, Patient Position: Sitting, Cuff Size: Adult)   Pulse 74   Ht 6' 1" (1 854 m)   Wt 89 4 kg (197 lb)   SpO2 98%   BMI 25 99 kg/m²   Physical Exam  Vitals reviewed  Constitutional:       Appearance: He is well-developed  HENT:      Head: Normocephalic and atraumatic  Eyes:      Conjunctiva/sclera: Conjunctivae normal       Pupils: Pupils are equal, round, and reactive to light  Cardiovascular:      Rate and Rhythm: Normal rate  Heart sounds: Normal heart sounds     Pulmonary:      Effort: Pulmonary effort is normal       Breath sounds: Normal breath sounds  Musculoskeletal:      Cervical back: Normal range of motion and neck supple  Skin:     General: Skin is warm and dry  Neurological:      Mental Status: He is alert and oriented to person, place, and time  Discussion/Summary:I will continue the patient's current medical regimen  The patient appears well compensated  I have asked the patient to call if there is a problem in the interim otherwise I will see the patient in six months time  The patient is due for an echo prior to the next visit to assess LV wall thickness and systolic function

## 2021-06-17 ENCOUNTER — REMOTE DEVICE CLINIC VISIT (OUTPATIENT)
Dept: CARDIOLOGY CLINIC | Facility: CLINIC | Age: 86
End: 2021-06-17
Payer: COMMERCIAL

## 2021-06-17 DIAGNOSIS — Z95.0 CARDIAC PACEMAKER IN SITU: Primary | ICD-10-CM

## 2021-06-17 PROCEDURE — 93296 REM INTERROG EVL PM/IDS: CPT | Performed by: INTERNAL MEDICINE

## 2021-06-17 PROCEDURE — 93294 REM INTERROG EVL PM/LDLS PM: CPT | Performed by: INTERNAL MEDICINE

## 2021-06-17 NOTE — PROGRESS NOTES
Results for orders placed or performed in visit on 06/17/21   Cardiac EP device report    Narrative    BSC-DUAL CHAMBER PPM (DDD MODE)/ NOT MRI CONDITIONAL  LATITUDE TRANSMISSION: BATTERY STATUS "3 5 YRS " AP 40%  66%  ALL AVAILABLE LEAD PARAMETERS WITHIN NORMAL LIMITS  NO SIGNIFICANT HIGH RATE EPISODES  NORMAL DEVICE FUNCTION   NC         Current Outpatient Medications:     amLODIPine (NORVASC) 5 mg tablet, TAKE 1 TABLET TWICE DAILY, Disp: 60 tablet, Rfl: 10    B Complex Vitamins (VITAMIN B COMPLEX PO), Take 1 tablet by mouth daily, Disp: , Rfl:     bisacodyl (DULCOLAX) 5 mg EC tablet, Take 1 tablet (5 mg total) by mouth daily as needed for constipation, Disp: 10 tablet, Rfl: 0    Cholecalciferol (VITAMIN D PO), Take 1 capsule by mouth daily , Disp: , Rfl:     GaviLAX 17 GM/SCOOP powder, TAKE 17 G (1 CAPFUL) BY MOUTH DAILY AS NEEDED (CONSTIPATION) - MIX WITH LIQUID BEFORE TAKING , Disp: 510 g, Rfl: 5    hydrochlorothiazide (MICROZIDE) 12 5 mg capsule, TAKE 1 CAPSULE DAILY, Disp: 30 capsule, Rfl: 0    metoprolol tartrate (LOPRESSOR) 25 mg tablet, TAKE 1/2 TABLET EVERY 12 HOURS, Disp: 30 tablet, Rfl: 11    Multiple Vitamins-Minerals (MULTIVITAMIN ADULT PO), Take 1 tablet by mouth daily, Disp: , Rfl:     potassium chloride (K-DUR,KLOR-CON) 20 mEq tablet, TAKE 2 TABLETS DAILY AS DIRECTED, Disp: 60 tablet, Rfl: 0    tamsulosin (FLOMAX) 0 4 mg, Take 1 capsule (0 4 mg total) by mouth daily at bedtime, Disp: 30 capsule, Rfl: 5

## 2021-06-22 ENCOUNTER — OFFICE VISIT (OUTPATIENT)
Dept: CARDIOLOGY CLINIC | Facility: CLINIC | Age: 86
End: 2021-06-22
Payer: COMMERCIAL

## 2021-06-22 VITALS
HEART RATE: 74 BPM | OXYGEN SATURATION: 98 % | HEIGHT: 73 IN | DIASTOLIC BLOOD PRESSURE: 78 MMHG | BODY MASS INDEX: 26.11 KG/M2 | SYSTOLIC BLOOD PRESSURE: 124 MMHG | WEIGHT: 197 LBS

## 2021-06-22 DIAGNOSIS — Z95.0 PACEMAKER: ICD-10-CM

## 2021-06-22 DIAGNOSIS — I51.7 LVH (LEFT VENTRICULAR HYPERTROPHY): ICD-10-CM

## 2021-06-22 DIAGNOSIS — I47.1 SVT (SUPRAVENTRICULAR TACHYCARDIA) (HCC): ICD-10-CM

## 2021-06-22 DIAGNOSIS — I35.9 AORTIC VALVE DISEASE: ICD-10-CM

## 2021-06-22 DIAGNOSIS — I10 ESSENTIAL (PRIMARY) HYPERTENSION: Primary | ICD-10-CM

## 2021-06-22 PROCEDURE — 99214 OFFICE O/P EST MOD 30 MIN: CPT | Performed by: INTERNAL MEDICINE

## 2021-06-22 PROCEDURE — 3078F DIAST BP <80 MM HG: CPT | Performed by: INTERNAL MEDICINE

## 2021-06-22 PROCEDURE — 93000 ELECTROCARDIOGRAM COMPLETE: CPT | Performed by: INTERNAL MEDICINE

## 2021-06-22 PROCEDURE — 3074F SYST BP LT 130 MM HG: CPT | Performed by: INTERNAL MEDICINE

## 2021-06-22 PROCEDURE — 1160F RVW MEDS BY RX/DR IN RCRD: CPT | Performed by: INTERNAL MEDICINE

## 2021-07-08 DIAGNOSIS — I10 ESSENTIAL (PRIMARY) HYPERTENSION: ICD-10-CM

## 2021-07-08 RX ORDER — POTASSIUM CHLORIDE 20 MEQ/1
TABLET, EXTENDED RELEASE ORAL
Qty: 60 TABLET | Refills: 0 | Status: SHIPPED | OUTPATIENT
Start: 2021-07-08 | End: 2021-08-09

## 2021-07-08 RX ORDER — HYDROCHLOROTHIAZIDE 12.5 MG/1
CAPSULE, GELATIN COATED ORAL
Qty: 30 CAPSULE | Refills: 0 | Status: SHIPPED | OUTPATIENT
Start: 2021-07-08 | End: 2021-08-09

## 2021-08-09 DIAGNOSIS — I10 ESSENTIAL (PRIMARY) HYPERTENSION: ICD-10-CM

## 2021-08-09 RX ORDER — HYDROCHLOROTHIAZIDE 12.5 MG/1
CAPSULE, GELATIN COATED ORAL
Qty: 30 CAPSULE | Refills: 0 | Status: SHIPPED | OUTPATIENT
Start: 2021-08-09 | End: 2021-09-07

## 2021-08-09 RX ORDER — POTASSIUM CHLORIDE 20 MEQ/1
TABLET, EXTENDED RELEASE ORAL
Qty: 60 TABLET | Refills: 0 | Status: SHIPPED | OUTPATIENT
Start: 2021-08-09 | End: 2021-09-07

## 2021-08-20 ENCOUNTER — HOSPITAL ENCOUNTER (OUTPATIENT)
Dept: NON INVASIVE DIAGNOSTICS | Facility: CLINIC | Age: 86
Discharge: HOME | End: 2021-08-20
Payer: COMMERCIAL

## 2021-08-20 DIAGNOSIS — I51.7 LVH (LEFT VENTRICULAR HYPERTROPHY): ICD-10-CM

## 2021-08-20 DIAGNOSIS — I10 ESSENTIAL (PRIMARY) HYPERTENSION: ICD-10-CM

## 2021-08-20 DIAGNOSIS — I35.9 AORTIC VALVE DISEASE: ICD-10-CM

## 2021-08-20 DIAGNOSIS — I47.1 SVT (SUPRAVENTRICULAR TACHYCARDIA) (HCC): ICD-10-CM

## 2021-08-20 DIAGNOSIS — Z95.0 PACEMAKER: ICD-10-CM

## 2021-08-20 PROCEDURE — 93306 TTE W/DOPPLER COMPLETE: CPT

## 2021-08-20 PROCEDURE — 93306 TTE W/DOPPLER COMPLETE: CPT | Performed by: INTERNAL MEDICINE

## 2021-09-07 DIAGNOSIS — I10 ESSENTIAL (PRIMARY) HYPERTENSION: ICD-10-CM

## 2021-09-07 DIAGNOSIS — R35.1 NOCTURIA: ICD-10-CM

## 2021-09-07 RX ORDER — POTASSIUM CHLORIDE 20 MEQ/1
TABLET, EXTENDED RELEASE ORAL
Qty: 60 TABLET | Refills: 0 | Status: SHIPPED | OUTPATIENT
Start: 2021-09-07 | End: 2021-11-04

## 2021-09-07 RX ORDER — HYDROCHLOROTHIAZIDE 12.5 MG/1
CAPSULE, GELATIN COATED ORAL
Qty: 30 CAPSULE | Refills: 0 | Status: SHIPPED | OUTPATIENT
Start: 2021-09-07 | End: 2021-11-04

## 2021-09-07 RX ORDER — TAMSULOSIN HYDROCHLORIDE 0.4 MG/1
0.4 CAPSULE ORAL
Qty: 30 CAPSULE | Refills: 5 | Status: SHIPPED | OUTPATIENT
Start: 2021-09-07

## 2021-09-16 ENCOUNTER — REMOTE DEVICE CLINIC VISIT (OUTPATIENT)
Dept: CARDIOLOGY CLINIC | Facility: CLINIC | Age: 86
End: 2021-09-16
Payer: COMMERCIAL

## 2021-09-16 DIAGNOSIS — Z95.0 PRESENCE OF PERMANENT CARDIAC PACEMAKER: Primary | ICD-10-CM

## 2021-09-16 PROCEDURE — 93294 REM INTERROG EVL PM/LDLS PM: CPT | Performed by: INTERNAL MEDICINE

## 2021-09-16 PROCEDURE — 93296 REM INTERROG EVL PM/IDS: CPT | Performed by: INTERNAL MEDICINE

## 2021-09-16 NOTE — PROGRESS NOTES
BSC-DUAL CHAMBER PPM (DDD MODE)/ NOT MRI CONDITIONAL   LATITUDE TRANSMISSION:  BATTERY VOLTAGE ADEQUATE (3 5 YR)    AP 39%  68% (>40%/CHB)   ALL LEAD PARAMETERS WITHIN NORMAL LIMITS   NO HIGH RATE EPISODES   NORMAL DEVICE FUNCTION   RG

## 2021-10-10 ENCOUNTER — APPOINTMENT (INPATIENT)
Dept: RADIOLOGY | Facility: HOSPITAL | Age: 86
DRG: 522 | End: 2021-10-10
Payer: COMMERCIAL

## 2021-10-10 ENCOUNTER — ANESTHESIA EVENT (INPATIENT)
Dept: PERIOP | Facility: HOSPITAL | Age: 86
DRG: 522 | End: 2021-10-10
Payer: COMMERCIAL

## 2021-10-10 ENCOUNTER — HOSPITAL ENCOUNTER (INPATIENT)
Facility: HOSPITAL | Age: 86
LOS: 4 days | Discharge: NURSING, REHAB OR LONG-TERM CARE | DRG: 522 | End: 2021-10-14
Attending: EMERGENCY MEDICINE | Admitting: SURGERY
Payer: COMMERCIAL

## 2021-10-10 ENCOUNTER — ANESTHESIA (INPATIENT)
Dept: PERIOP | Facility: HOSPITAL | Age: 86
DRG: 522 | End: 2021-10-10
Payer: COMMERCIAL

## 2021-10-10 ENCOUNTER — APPOINTMENT (EMERGENCY)
Dept: RADIOLOGY | Facility: HOSPITAL | Age: 86
DRG: 522 | End: 2021-10-10
Payer: COMMERCIAL

## 2021-10-10 ENCOUNTER — APPOINTMENT (EMERGENCY)
Dept: CT IMAGING | Facility: HOSPITAL | Age: 86
DRG: 522 | End: 2021-10-10
Payer: COMMERCIAL

## 2021-10-10 DIAGNOSIS — S72.001A CLOSED DISPLACED FRACTURE OF RIGHT FEMORAL NECK (HCC): Primary | ICD-10-CM
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
## 2021-10-10 PROBLEM — R93.89 ABNORMAL FINDING ON CT SCAN: Status: ACTIVE | Noted: 2021-10-10

## 2021-10-10 LAB
ABO GROUP BLD: NORMAL
ANION GAP SERPL CALCULATED.3IONS-SCNC: 9 MMOL/L (ref 4–13)
APTT PPP: 32 SECONDS (ref 23–37)
ATRIAL RATE: 52 BPM
ATRIAL RATE: 55 BPM
BASOPHILS # BLD AUTO: 0.04 THOUSANDS/ΜL (ref 0–0.1)
BASOPHILS NFR BLD AUTO: 1 % (ref 0–1)
BLD GP AB SCN SERPL QL: NEGATIVE
BUN SERPL-MCNC: 22 MG/DL (ref 5–25)
CALCIUM SERPL-MCNC: 9 MG/DL (ref 8.3–10.1)
CHLORIDE SERPL-SCNC: 106 MMOL/L (ref 100–108)
CO2 SERPL-SCNC: 25 MMOL/L (ref 21–32)
CREAT SERPL-MCNC: 0.76 MG/DL (ref 0.6–1.3)
EOSINOPHIL # BLD AUTO: 0.3 THOUSAND/ΜL (ref 0–0.61)
EOSINOPHIL NFR BLD AUTO: 4 % (ref 0–6)
ERYTHROCYTE [DISTWIDTH] IN BLOOD BY AUTOMATED COUNT: 14 % (ref 11.6–15.1)
GFR SERPL CREATININE-BSD FRML MDRD: 83 ML/MIN/1.73SQ M
GLUCOSE SERPL-MCNC: 130 MG/DL (ref 65–140)
HCT VFR BLD AUTO: 37.9 % (ref 36.5–49.3)
HGB BLD-MCNC: 12.5 G/DL (ref 12–17)
IMM GRANULOCYTES # BLD AUTO: 0.1 THOUSAND/UL (ref 0–0.2)
IMM GRANULOCYTES NFR BLD AUTO: 1 % (ref 0–2)
INR PPP: 1.05 (ref 0.84–1.19)
LYMPHOCYTES # BLD AUTO: 1.57 THOUSANDS/ΜL (ref 0.6–4.47)
LYMPHOCYTES NFR BLD AUTO: 20 % (ref 14–44)
MCH RBC QN AUTO: 34.1 PG (ref 26.8–34.3)
MCHC RBC AUTO-ENTMCNC: 33 G/DL (ref 31.4–37.4)
MCV RBC AUTO: 103 FL (ref 82–98)
MONOCYTES # BLD AUTO: 0.67 THOUSAND/ΜL (ref 0.17–1.22)
MONOCYTES NFR BLD AUTO: 9 % (ref 4–12)
NEUTROPHILS # BLD AUTO: 5.18 THOUSANDS/ΜL (ref 1.85–7.62)
NEUTS SEG NFR BLD AUTO: 65 % (ref 43–75)
NRBC BLD AUTO-RTO: 0 /100 WBCS
P AXIS: 52 DEGREES
PLATELET # BLD AUTO: 210 THOUSANDS/UL (ref 149–390)
PMV BLD AUTO: 11 FL (ref 8.9–12.7)
POTASSIUM SERPL-SCNC: 4.1 MMOL/L (ref 3.5–5.3)
PR INTERVAL: 144 MS
PR INTERVAL: 244 MS
PROTHROMBIN TIME: 13.7 SECONDS (ref 11.6–14.5)
QRS AXIS: -70 DEGREES
QRS AXIS: 29 DEGREES
QRSD INTERVAL: 12 MS
QRSD INTERVAL: 190 MS
QT INTERVAL: 296 MS
QT INTERVAL: 520 MS
QTC INTERVAL: 471 MS
QTC INTERVAL: 489 MS
RBC # BLD AUTO: 3.67 MILLION/UL (ref 3.88–5.62)
RH BLD: POSITIVE
SODIUM SERPL-SCNC: 140 MMOL/L (ref 136–145)
SPECIMEN EXPIRATION DATE: NORMAL
T WAVE AXIS: -61 DEGREES
T WAVE AXIS: 90 DEGREES
TROPONIN I SERPL-MCNC: <0.02 NG/ML
VENTRICULAR RATE: 152 BPM
VENTRICULAR RATE: 53 BPM
WBC # BLD AUTO: 7.86 THOUSAND/UL (ref 4.31–10.16)

## 2021-10-10 PROCEDURE — 74177 CT ABD & PELVIS W/CONTRAST: CPT

## 2021-10-10 PROCEDURE — 27236 TREAT THIGH FRACTURE: CPT | Performed by: PHYSICIAN ASSISTANT

## 2021-10-10 PROCEDURE — NC001 PR NO CHARGE: Performed by: PHYSICIAN ASSISTANT

## 2021-10-10 PROCEDURE — 70450 CT HEAD/BRAIN W/O DYE: CPT

## 2021-10-10 PROCEDURE — 86900 BLOOD TYPING SEROLOGIC ABO: CPT

## 2021-10-10 PROCEDURE — 99222 1ST HOSP IP/OBS MODERATE 55: CPT | Performed by: SURGERY

## 2021-10-10 PROCEDURE — C1776 JOINT DEVICE (IMPLANTABLE): HCPCS | Performed by: ORTHOPAEDIC SURGERY

## 2021-10-10 PROCEDURE — 99285 EMERGENCY DEPT VISIT HI MDM: CPT

## 2021-10-10 PROCEDURE — 73700 CT LOWER EXTREMITY W/O DYE: CPT

## 2021-10-10 PROCEDURE — 0SRR0JA REPLACEMENT OF RIGHT HIP JOINT, FEMORAL SURFACE WITH SYNTHETIC SUBSTITUTE, UNCEMENTED, OPEN APPROACH: ICD-10-PCS | Performed by: ORTHOPAEDIC SURGERY

## 2021-10-10 PROCEDURE — 72125 CT NECK SPINE W/O DYE: CPT

## 2021-10-10 PROCEDURE — 93005 ELECTROCARDIOGRAM TRACING: CPT

## 2021-10-10 PROCEDURE — 36415 COLL VENOUS BLD VENIPUNCTURE: CPT

## 2021-10-10 PROCEDURE — 80048 BASIC METABOLIC PNL TOTAL CA: CPT

## 2021-10-10 PROCEDURE — 84484 ASSAY OF TROPONIN QUANT: CPT

## 2021-10-10 PROCEDURE — 71260 CT THORAX DX C+: CPT

## 2021-10-10 PROCEDURE — 85610 PROTHROMBIN TIME: CPT

## 2021-10-10 PROCEDURE — 86901 BLOOD TYPING SEROLOGIC RH(D): CPT

## 2021-10-10 PROCEDURE — 72170 X-RAY EXAM OF PELVIS: CPT

## 2021-10-10 PROCEDURE — 27236 TREAT THIGH FRACTURE: CPT | Performed by: ORTHOPAEDIC SURGERY

## 2021-10-10 PROCEDURE — 86850 RBC ANTIBODY SCREEN: CPT

## 2021-10-10 PROCEDURE — 85730 THROMBOPLASTIN TIME PARTIAL: CPT

## 2021-10-10 PROCEDURE — 99222 1ST HOSP IP/OBS MODERATE 55: CPT | Performed by: ORTHOPAEDIC SURGERY

## 2021-10-10 PROCEDURE — 85025 COMPLETE CBC W/AUTO DIFF WBC: CPT

## 2021-10-10 PROCEDURE — 93010 ELECTROCARDIOGRAM REPORT: CPT | Performed by: INTERNAL MEDICINE

## 2021-10-10 PROCEDURE — 73502 X-RAY EXAM HIP UNI 2-3 VIEWS: CPT

## 2021-10-10 DEVICE — ARTICUL/EZE FEMORAL HEAD DIAMETER 28MM +1.5 12/14 TAPER
Type: IMPLANTABLE DEVICE | Site: HIP | Status: FUNCTIONAL
Brand: ARTICUL/EZE

## 2021-10-10 DEVICE — SELF CENTERING BI-POLAR HEAD 28MM ID 57MM OD
Type: IMPLANTABLE DEVICE | Site: HIP | Status: FUNCTIONAL
Brand: SELF CENTERING

## 2021-10-10 DEVICE — TRI-LOCK BPS FEMORAL STEM 12/14 TAPER TRI-LOCK BPS W/GRIPTION SIZE 8 STD 111MM
Type: IMPLANTABLE DEVICE | Site: HIP | Status: FUNCTIONAL
Brand: TRI-LOCK GRIPTION

## 2021-10-10 RX ORDER — MAGNESIUM HYDROXIDE 1200 MG/15ML
LIQUID ORAL AS NEEDED
Status: DISCONTINUED | OUTPATIENT
Start: 2021-10-10 | End: 2021-10-10 | Stop reason: HOSPADM

## 2021-10-10 RX ORDER — AMLODIPINE BESYLATE 5 MG/1
5 TABLET ORAL 2 TIMES DAILY
Status: DISCONTINUED | OUTPATIENT
Start: 2021-10-10 | End: 2021-10-14 | Stop reason: HOSPADM

## 2021-10-10 RX ORDER — FENTANYL CITRATE 50 UG/ML
INJECTION, SOLUTION INTRAMUSCULAR; INTRAVENOUS AS NEEDED
Status: DISCONTINUED | OUTPATIENT
Start: 2021-10-10 | End: 2021-10-10

## 2021-10-10 RX ORDER — HYDROMORPHONE HCL/PF 1 MG/ML
0.2 SYRINGE (ML) INJECTION EVERY 4 HOURS PRN
Status: DISCONTINUED | OUTPATIENT
Start: 2021-10-10 | End: 2021-10-14 | Stop reason: HOSPADM

## 2021-10-10 RX ORDER — GLYCOPYRROLATE 0.2 MG/ML
INJECTION INTRAMUSCULAR; INTRAVENOUS AS NEEDED
Status: DISCONTINUED | OUTPATIENT
Start: 2021-10-10 | End: 2021-10-10

## 2021-10-10 RX ORDER — NEOSTIGMINE METHYLSULFATE 1 MG/ML
INJECTION INTRAVENOUS AS NEEDED
Status: DISCONTINUED | OUTPATIENT
Start: 2021-10-10 | End: 2021-10-10

## 2021-10-10 RX ORDER — DEXAMETHASONE SODIUM PHOSPHATE 10 MG/ML
INJECTION, SOLUTION INTRAMUSCULAR; INTRAVENOUS AS NEEDED
Status: DISCONTINUED | OUTPATIENT
Start: 2021-10-10 | End: 2021-10-10

## 2021-10-10 RX ORDER — AMOXICILLIN 250 MG
1 CAPSULE ORAL 2 TIMES DAILY
Status: DISCONTINUED | OUTPATIENT
Start: 2021-10-10 | End: 2021-10-14 | Stop reason: HOSPADM

## 2021-10-10 RX ORDER — HYDROMORPHONE HCL/PF 1 MG/ML
0.5 SYRINGE (ML) INJECTION
Status: DISCONTINUED | OUTPATIENT
Start: 2021-10-10 | End: 2021-10-10 | Stop reason: HOSPADM

## 2021-10-10 RX ORDER — LIDOCAINE HYDROCHLORIDE 10 MG/ML
INJECTION, SOLUTION EPIDURAL; INFILTRATION; INTRACAUDAL; PERINEURAL AS NEEDED
Status: DISCONTINUED | OUTPATIENT
Start: 2021-10-10 | End: 2021-10-10

## 2021-10-10 RX ORDER — CEFAZOLIN SODIUM 1 G/3ML
INJECTION, POWDER, FOR SOLUTION INTRAMUSCULAR; INTRAVENOUS AS NEEDED
Status: DISCONTINUED | OUTPATIENT
Start: 2021-10-10 | End: 2021-10-10

## 2021-10-10 RX ORDER — ONDANSETRON 2 MG/ML
INJECTION INTRAMUSCULAR; INTRAVENOUS AS NEEDED
Status: DISCONTINUED | OUTPATIENT
Start: 2021-10-10 | End: 2021-10-10

## 2021-10-10 RX ORDER — LABETALOL 20 MG/4 ML (5 MG/ML) INTRAVENOUS SYRINGE
5
Status: DISCONTINUED | OUTPATIENT
Start: 2021-10-10 | End: 2021-10-10 | Stop reason: HOSPADM

## 2021-10-10 RX ORDER — ALBUMIN, HUMAN INJ 5% 5 %
SOLUTION INTRAVENOUS CONTINUOUS PRN
Status: DISCONTINUED | OUTPATIENT
Start: 2021-10-10 | End: 2021-10-10

## 2021-10-10 RX ORDER — OXYCODONE HYDROCHLORIDE 5 MG/1
2.5 TABLET ORAL EVERY 4 HOURS PRN
Status: DISCONTINUED | OUTPATIENT
Start: 2021-10-10 | End: 2021-10-14 | Stop reason: HOSPADM

## 2021-10-10 RX ORDER — TRANEXAMIC ACID 100 MG/ML
INJECTION, SOLUTION INTRAVENOUS AS NEEDED
Status: DISCONTINUED | OUTPATIENT
Start: 2021-10-10 | End: 2021-10-10

## 2021-10-10 RX ORDER — ONDANSETRON 2 MG/ML
4 INJECTION INTRAMUSCULAR; INTRAVENOUS ONCE AS NEEDED
Status: COMPLETED | OUTPATIENT
Start: 2021-10-10 | End: 2021-10-10

## 2021-10-10 RX ORDER — ONDANSETRON 2 MG/ML
4 INJECTION INTRAMUSCULAR; INTRAVENOUS EVERY 6 HOURS PRN
Status: DISCONTINUED | OUTPATIENT
Start: 2021-10-10 | End: 2021-10-10

## 2021-10-10 RX ORDER — POTASSIUM CHLORIDE 20 MEQ/1
40 TABLET, EXTENDED RELEASE ORAL DAILY
Status: DISCONTINUED | OUTPATIENT
Start: 2021-10-10 | End: 2021-10-14 | Stop reason: HOSPADM

## 2021-10-10 RX ORDER — FENTANYL CITRATE/PF 50 MCG/ML
25 SYRINGE (ML) INJECTION
Status: DISCONTINUED | OUTPATIENT
Start: 2021-10-10 | End: 2021-10-10 | Stop reason: HOSPADM

## 2021-10-10 RX ORDER — ACETAMINOPHEN 325 MG/1
650 TABLET ORAL ONCE
Status: COMPLETED | OUTPATIENT
Start: 2021-10-10 | End: 2021-10-10

## 2021-10-10 RX ORDER — LIDOCAINE 50 MG/G
1 PATCH TOPICAL DAILY
Status: ACTIVE | OUTPATIENT
Start: 2021-10-10 | End: 2021-10-11

## 2021-10-10 RX ORDER — GABAPENTIN 100 MG/1
100 CAPSULE ORAL
Status: DISCONTINUED | OUTPATIENT
Start: 2021-10-10 | End: 2021-10-14 | Stop reason: HOSPADM

## 2021-10-10 RX ORDER — SODIUM CHLORIDE 9 MG/ML
INJECTION, SOLUTION INTRAVENOUS CONTINUOUS PRN
Status: DISCONTINUED | OUTPATIENT
Start: 2021-10-10 | End: 2021-10-10

## 2021-10-10 RX ORDER — TAMSULOSIN HYDROCHLORIDE 0.4 MG/1
0.4 CAPSULE ORAL
Status: DISCONTINUED | OUTPATIENT
Start: 2021-10-10 | End: 2021-10-14 | Stop reason: HOSPADM

## 2021-10-10 RX ORDER — OXYCODONE HYDROCHLORIDE 5 MG/1
5 TABLET ORAL EVERY 4 HOURS PRN
Status: DISCONTINUED | OUTPATIENT
Start: 2021-10-10 | End: 2021-10-14 | Stop reason: HOSPADM

## 2021-10-10 RX ORDER — MEPERIDINE HYDROCHLORIDE 25 MG/ML
12.5 INJECTION INTRAMUSCULAR; INTRAVENOUS; SUBCUTANEOUS ONCE
Status: DISCONTINUED | OUTPATIENT
Start: 2021-10-10 | End: 2021-10-10 | Stop reason: HOSPADM

## 2021-10-10 RX ORDER — ALBUTEROL SULFATE 2.5 MG/3ML
2.5 SOLUTION RESPIRATORY (INHALATION) ONCE AS NEEDED
Status: DISCONTINUED | OUTPATIENT
Start: 2021-10-10 | End: 2021-10-10 | Stop reason: HOSPADM

## 2021-10-10 RX ORDER — CEFAZOLIN SODIUM 2 G/50ML
2000 SOLUTION INTRAVENOUS EVERY 8 HOURS
Status: COMPLETED | OUTPATIENT
Start: 2021-10-11 | End: 2021-10-11

## 2021-10-10 RX ORDER — ROCURONIUM BROMIDE 10 MG/ML
INJECTION, SOLUTION INTRAVENOUS AS NEEDED
Status: DISCONTINUED | OUTPATIENT
Start: 2021-10-10 | End: 2021-10-10

## 2021-10-10 RX ORDER — SODIUM CHLORIDE, SODIUM GLUCONATE, SODIUM ACETATE, POTASSIUM CHLORIDE, MAGNESIUM CHLORIDE, SODIUM PHOSPHATE, DIBASIC, AND POTASSIUM PHOSPHATE .53; .5; .37; .037; .03; .012; .00082 G/100ML; G/100ML; G/100ML; G/100ML; G/100ML; G/100ML; G/100ML
INJECTION, SOLUTION INTRAVENOUS AS NEEDED
Status: DISCONTINUED | OUTPATIENT
Start: 2021-10-10 | End: 2021-10-10

## 2021-10-10 RX ORDER — SODIUM CHLORIDE, SODIUM GLUCONATE, SODIUM ACETATE, POTASSIUM CHLORIDE, MAGNESIUM CHLORIDE, SODIUM PHOSPHATE, DIBASIC, AND POTASSIUM PHOSPHATE .53; .5; .37; .037; .03; .012; .00082 G/100ML; G/100ML; G/100ML; G/100ML; G/100ML; G/100ML; G/100ML
100 INJECTION, SOLUTION INTRAVENOUS CONTINUOUS
Status: DISCONTINUED | OUTPATIENT
Start: 2021-10-10 | End: 2021-10-11

## 2021-10-10 RX ORDER — PROPOFOL 10 MG/ML
INJECTION, EMULSION INTRAVENOUS AS NEEDED
Status: DISCONTINUED | OUTPATIENT
Start: 2021-10-10 | End: 2021-10-10

## 2021-10-10 RX ORDER — ACETAMINOPHEN 325 MG/1
975 TABLET ORAL EVERY 8 HOURS SCHEDULED
Status: DISCONTINUED | OUTPATIENT
Start: 2021-10-10 | End: 2021-10-14 | Stop reason: HOSPADM

## 2021-10-10 RX ORDER — PROMETHAZINE HYDROCHLORIDE 25 MG/ML
12.5 INJECTION, SOLUTION INTRAMUSCULAR; INTRAVENOUS ONCE AS NEEDED
Status: DISCONTINUED | OUTPATIENT
Start: 2021-10-10 | End: 2021-10-10 | Stop reason: HOSPADM

## 2021-10-10 RX ADMIN — GLYCOPYRROLATE 0.4 MG: 0.2 INJECTION, SOLUTION INTRAMUSCULAR; INTRAVENOUS at 18:51

## 2021-10-10 RX ADMIN — SODIUM CHLORIDE, SODIUM GLUCONATE, SODIUM ACETATE, POTASSIUM CHLORIDE, MAGNESIUM CHLORIDE, SODIUM PHOSPHATE, DIBASIC, AND POTASSIUM PHOSPHATE 100 ML/HR: .53; .5; .37; .037; .03; .012; .00082 INJECTION, SOLUTION INTRAVENOUS at 21:47

## 2021-10-10 RX ADMIN — SODIUM CHLORIDE, SODIUM GLUCONATE, SODIUM ACETATE, POTASSIUM CHLORIDE, MAGNESIUM CHLORIDE, SODIUM PHOSPHATE, DIBASIC, AND POTASSIUM PHOSPHATE 100 ML/HR: .53; .5; .37; .037; .03; .012; .00082 INJECTION, SOLUTION INTRAVENOUS at 14:33

## 2021-10-10 RX ADMIN — FENTANYL CITRATE 50 MCG: 50 INJECTION, SOLUTION INTRAMUSCULAR; INTRAVENOUS at 17:29

## 2021-10-10 RX ADMIN — LIDOCAINE HYDROCHLORIDE 50 MG: 10 INJECTION, SOLUTION EPIDURAL; INFILTRATION; INTRACAUDAL at 17:29

## 2021-10-10 RX ADMIN — SODIUM CHLORIDE: 0.9 INJECTION, SOLUTION INTRAVENOUS at 17:35

## 2021-10-10 RX ADMIN — SODIUM CHLORIDE, SODIUM GLUCONATE, SODIUM ACETATE, POTASSIUM CHLORIDE, MAGNESIUM CHLORIDE, SODIUM PHOSPHATE, DIBASIC, AND POTASSIUM PHOSPHATE 100 ML: .53; .5; .37; .037; .03; .012; .00082 INJECTION, SOLUTION INTRAVENOUS at 17:25

## 2021-10-10 RX ADMIN — Medication 12.5 MG: at 21:55

## 2021-10-10 RX ADMIN — HYDROMORPHONE HYDROCHLORIDE 0.2 MG: 1 INJECTION, SOLUTION INTRAMUSCULAR; INTRAVENOUS; SUBCUTANEOUS at 14:58

## 2021-10-10 RX ADMIN — DEXAMETHASONE SODIUM PHOSPHATE 8 MG: 10 INJECTION, SOLUTION INTRAMUSCULAR; INTRAVENOUS at 17:54

## 2021-10-10 RX ADMIN — FENTANYL CITRATE 25 MCG: 50 INJECTION, SOLUTION INTRAMUSCULAR; INTRAVENOUS at 18:06

## 2021-10-10 RX ADMIN — PROPOFOL 150 MG: 10 INJECTION, EMULSION INTRAVENOUS at 17:29

## 2021-10-10 RX ADMIN — IOHEXOL 100 ML: 350 INJECTION, SOLUTION INTRAVENOUS at 04:47

## 2021-10-10 RX ADMIN — CEFAZOLIN SODIUM 2000 MG: 1 INJECTION, POWDER, FOR SOLUTION INTRAMUSCULAR; INTRAVENOUS at 17:35

## 2021-10-10 RX ADMIN — FENTANYL CITRATE 25 MCG: 50 INJECTION INTRAMUSCULAR; INTRAVENOUS at 19:26

## 2021-10-10 RX ADMIN — FENTANYL CITRATE 25 MCG: 50 INJECTION, SOLUTION INTRAMUSCULAR; INTRAVENOUS at 17:56

## 2021-10-10 RX ADMIN — GABAPENTIN 100 MG: 100 CAPSULE ORAL at 21:54

## 2021-10-10 RX ADMIN — TAMSULOSIN HYDROCHLORIDE 0.4 MG: 0.4 CAPSULE ORAL at 21:56

## 2021-10-10 RX ADMIN — ONDANSETRON 4 MG: 2 INJECTION INTRAMUSCULAR; INTRAVENOUS at 19:28

## 2021-10-10 RX ADMIN — PHENYLEPHRINE HYDROCHLORIDE 20 MCG/MIN: 10 INJECTION INTRAVENOUS at 17:40

## 2021-10-10 RX ADMIN — NEOSTIGMINE METHYLSULFATE 3 MG: 1 INJECTION INTRAVENOUS at 18:51

## 2021-10-10 RX ADMIN — HYDROMORPHONE HYDROCHLORIDE 0.2 MG: 1 INJECTION, SOLUTION INTRAMUSCULAR; INTRAVENOUS; SUBCUTANEOUS at 10:45

## 2021-10-10 RX ADMIN — ACETAMINOPHEN 650 MG: 325 TABLET, FILM COATED ORAL at 05:41

## 2021-10-10 RX ADMIN — FENTANYL CITRATE 25 MCG: 50 INJECTION INTRAMUSCULAR; INTRAVENOUS at 19:17

## 2021-10-10 RX ADMIN — ONDANSETRON 4 MG: 2 INJECTION INTRAMUSCULAR; INTRAVENOUS at 18:39

## 2021-10-10 RX ADMIN — ROCURONIUM BROMIDE 50 MG: 10 SOLUTION INTRAVENOUS at 17:30

## 2021-10-10 RX ADMIN — TRANEXAMIC ACID 100 MG: 1 INJECTION, SOLUTION INTRAVENOUS at 17:38

## 2021-10-10 RX ADMIN — ACETAMINOPHEN 975 MG: 325 TABLET, FILM COATED ORAL at 21:54

## 2021-10-10 RX ADMIN — ALBUMIN HUMAN: 0.05 INJECTION, SOLUTION INTRAVENOUS at 18:40

## 2021-10-11 PROBLEM — N40.0 BPH (BENIGN PROSTATIC HYPERPLASIA): Status: ACTIVE | Noted: 2021-10-11

## 2021-10-11 LAB
ANION GAP SERPL CALCULATED.3IONS-SCNC: 9 MMOL/L (ref 4–13)
BASOPHILS # BLD AUTO: 0.01 THOUSANDS/ΜL (ref 0–0.1)
BASOPHILS NFR BLD AUTO: 0 % (ref 0–1)
BUN SERPL-MCNC: 23 MG/DL (ref 5–25)
CALCIUM SERPL-MCNC: 7.9 MG/DL (ref 8.3–10.1)
CHLORIDE SERPL-SCNC: 106 MMOL/L (ref 100–108)
CO2 SERPL-SCNC: 25 MMOL/L (ref 21–32)
CREAT SERPL-MCNC: 0.79 MG/DL (ref 0.6–1.3)
EOSINOPHIL # BLD AUTO: 0 THOUSAND/ΜL (ref 0–0.61)
EOSINOPHIL NFR BLD AUTO: 0 % (ref 0–6)
ERYTHROCYTE [DISTWIDTH] IN BLOOD BY AUTOMATED COUNT: 13.8 % (ref 11.6–15.1)
ERYTHROCYTE [DISTWIDTH] IN BLOOD BY AUTOMATED COUNT: 14 % (ref 11.6–15.1)
GFR SERPL CREATININE-BSD FRML MDRD: 81 ML/MIN/1.73SQ M
GLUCOSE SERPL-MCNC: 148 MG/DL (ref 65–140)
HCT VFR BLD AUTO: 34 % (ref 36.5–49.3)
HCT VFR BLD AUTO: 34.3 % (ref 36.5–49.3)
HGB BLD-MCNC: 11.4 G/DL (ref 12–17)
HGB BLD-MCNC: 11.6 G/DL (ref 12–17)
IMM GRANULOCYTES # BLD AUTO: 0.05 THOUSAND/UL (ref 0–0.2)
IMM GRANULOCYTES NFR BLD AUTO: 0 % (ref 0–2)
LYMPHOCYTES # BLD AUTO: 0.89 THOUSANDS/ΜL (ref 0.6–4.47)
LYMPHOCYTES NFR BLD AUTO: 6 % (ref 14–44)
MCH RBC QN AUTO: 33.5 PG (ref 26.8–34.3)
MCH RBC QN AUTO: 34.2 PG (ref 26.8–34.3)
MCHC RBC AUTO-ENTMCNC: 33.5 G/DL (ref 31.4–37.4)
MCHC RBC AUTO-ENTMCNC: 33.8 G/DL (ref 31.4–37.4)
MCV RBC AUTO: 100 FL (ref 82–98)
MCV RBC AUTO: 101 FL (ref 82–98)
MONOCYTES # BLD AUTO: 0.91 THOUSAND/ΜL (ref 0.17–1.22)
MONOCYTES NFR BLD AUTO: 6 % (ref 4–12)
NEUTROPHILS # BLD AUTO: 12.45 THOUSANDS/ΜL (ref 1.85–7.62)
NEUTS SEG NFR BLD AUTO: 88 % (ref 43–75)
NRBC BLD AUTO-RTO: 0 /100 WBCS
PLATELET # BLD AUTO: 180 THOUSANDS/UL (ref 149–390)
PLATELET # BLD AUTO: 186 THOUSANDS/UL (ref 149–390)
PMV BLD AUTO: 10.2 FL (ref 8.9–12.7)
PMV BLD AUTO: 10.5 FL (ref 8.9–12.7)
POTASSIUM SERPL-SCNC: 4.5 MMOL/L (ref 3.5–5.3)
RBC # BLD AUTO: 3.39 MILLION/UL (ref 3.88–5.62)
RBC # BLD AUTO: 3.4 MILLION/UL (ref 3.88–5.62)
SODIUM SERPL-SCNC: 140 MMOL/L (ref 136–145)
WBC # BLD AUTO: 12.69 THOUSAND/UL (ref 4.31–10.16)
WBC # BLD AUTO: 14.31 THOUSAND/UL (ref 4.31–10.16)

## 2021-10-11 PROCEDURE — 99222 1ST HOSP IP/OBS MODERATE 55: CPT | Performed by: INTERNAL MEDICINE

## 2021-10-11 PROCEDURE — 85025 COMPLETE CBC W/AUTO DIFF WBC: CPT | Performed by: PHYSICIAN ASSISTANT

## 2021-10-11 PROCEDURE — 99024 POSTOP FOLLOW-UP VISIT: CPT | Performed by: PHYSICIAN ASSISTANT

## 2021-10-11 PROCEDURE — 85027 COMPLETE CBC AUTOMATED: CPT | Performed by: PHYSICIAN ASSISTANT

## 2021-10-11 PROCEDURE — 99232 SBSQ HOSP IP/OBS MODERATE 35: CPT | Performed by: SURGERY

## 2021-10-11 PROCEDURE — 97110 THERAPEUTIC EXERCISES: CPT

## 2021-10-11 PROCEDURE — 97163 PT EVAL HIGH COMPLEX 45 MIN: CPT

## 2021-10-11 PROCEDURE — 80048 BASIC METABOLIC PNL TOTAL CA: CPT | Performed by: PHYSICIAN ASSISTANT

## 2021-10-11 PROCEDURE — 97167 OT EVAL HIGH COMPLEX 60 MIN: CPT

## 2021-10-11 RX ADMIN — DOCUSATE SODIUM AND SENNOSIDES 1 TABLET: 8.6; 5 TABLET ORAL at 17:16

## 2021-10-11 RX ADMIN — ACETAMINOPHEN 975 MG: 325 TABLET, FILM COATED ORAL at 21:09

## 2021-10-11 RX ADMIN — TAMSULOSIN HYDROCHLORIDE 0.4 MG: 0.4 CAPSULE ORAL at 21:09

## 2021-10-11 RX ADMIN — CEFAZOLIN SODIUM 2000 MG: 2 SOLUTION INTRAVENOUS at 08:22

## 2021-10-11 RX ADMIN — DOCUSATE SODIUM AND SENNOSIDES 1 TABLET: 8.6; 5 TABLET ORAL at 08:15

## 2021-10-11 RX ADMIN — CEFAZOLIN SODIUM 2000 MG: 2 SOLUTION INTRAVENOUS at 01:54

## 2021-10-11 RX ADMIN — GABAPENTIN 100 MG: 100 CAPSULE ORAL at 21:09

## 2021-10-11 RX ADMIN — POTASSIUM CHLORIDE 40 MEQ: 1500 TABLET, EXTENDED RELEASE ORAL at 08:15

## 2021-10-11 RX ADMIN — Medication 12.5 MG: at 08:15

## 2021-10-11 RX ADMIN — Medication 12.5 MG: at 21:10

## 2021-10-11 RX ADMIN — OXYCODONE HYDROCHLORIDE 5 MG: 5 TABLET ORAL at 08:26

## 2021-10-11 RX ADMIN — AMLODIPINE BESYLATE 5 MG: 5 TABLET ORAL at 08:21

## 2021-10-11 RX ADMIN — ENOXAPARIN SODIUM 30 MG: 30 INJECTION SUBCUTANEOUS at 21:08

## 2021-10-11 RX ADMIN — ACETAMINOPHEN 975 MG: 325 TABLET, FILM COATED ORAL at 14:43

## 2021-10-11 RX ADMIN — ENOXAPARIN SODIUM 30 MG: 30 INJECTION SUBCUTANEOUS at 08:14

## 2021-10-11 RX ADMIN — ACETAMINOPHEN 975 MG: 325 TABLET, FILM COATED ORAL at 05:22

## 2021-10-12 LAB
ANION GAP SERPL CALCULATED.3IONS-SCNC: 8 MMOL/L (ref 4–13)
BUN SERPL-MCNC: 29 MG/DL (ref 5–25)
CALCIUM SERPL-MCNC: 8.1 MG/DL (ref 8.3–10.1)
CHLORIDE SERPL-SCNC: 105 MMOL/L (ref 100–108)
CO2 SERPL-SCNC: 24 MMOL/L (ref 21–32)
CREAT SERPL-MCNC: 0.93 MG/DL (ref 0.6–1.3)
FLUAV RNA RESP QL NAA+PROBE: NEGATIVE
FLUBV RNA RESP QL NAA+PROBE: NEGATIVE
GFR SERPL CREATININE-BSD FRML MDRD: 74 ML/MIN/1.73SQ M
GLUCOSE SERPL-MCNC: 135 MG/DL (ref 65–140)
POTASSIUM SERPL-SCNC: 4.4 MMOL/L (ref 3.5–5.3)
RSV RNA RESP QL NAA+PROBE: NEGATIVE
SARS-COV-2 RNA RESP QL NAA+PROBE: NEGATIVE
SODIUM SERPL-SCNC: 137 MMOL/L (ref 136–145)

## 2021-10-12 PROCEDURE — 99024 POSTOP FOLLOW-UP VISIT: CPT | Performed by: PHYSICIAN ASSISTANT

## 2021-10-12 PROCEDURE — 0241U HB NFCT DS VIR RESP RNA 4 TRGT: CPT | Performed by: PHYSICIAN ASSISTANT

## 2021-10-12 PROCEDURE — 80048 BASIC METABOLIC PNL TOTAL CA: CPT | Performed by: PHYSICIAN ASSISTANT

## 2021-10-12 PROCEDURE — 99232 SBSQ HOSP IP/OBS MODERATE 35: CPT | Performed by: INTERNAL MEDICINE

## 2021-10-12 PROCEDURE — 99232 SBSQ HOSP IP/OBS MODERATE 35: CPT | Performed by: SURGERY

## 2021-10-12 RX ORDER — BISACODYL 10 MG
10 SUPPOSITORY, RECTAL RECTAL ONCE
Status: DISCONTINUED | OUTPATIENT
Start: 2021-10-12 | End: 2021-10-14 | Stop reason: HOSPADM

## 2021-10-12 RX ORDER — CEFAZOLIN SODIUM 2 G/50ML
2000 SOLUTION INTRAVENOUS ONCE
Status: DISCONTINUED | OUTPATIENT
Start: 2021-10-12 | End: 2021-10-12

## 2021-10-12 RX ORDER — SODIUM CHLORIDE, SODIUM LACTATE, POTASSIUM CHLORIDE, CALCIUM CHLORIDE 600; 310; 30; 20 MG/100ML; MG/100ML; MG/100ML; MG/100ML
125 INJECTION, SOLUTION INTRAVENOUS CONTINUOUS
Status: DISCONTINUED | OUTPATIENT
Start: 2021-10-12 | End: 2021-10-12

## 2021-10-12 RX ORDER — SODIUM CHLORIDE, SODIUM LACTATE, POTASSIUM CHLORIDE, CALCIUM CHLORIDE 600; 310; 30; 20 MG/100ML; MG/100ML; MG/100ML; MG/100ML
60 INJECTION, SOLUTION INTRAVENOUS CONTINUOUS
Status: DISCONTINUED | OUTPATIENT
Start: 2021-10-12 | End: 2021-10-12

## 2021-10-12 RX ADMIN — TAMSULOSIN HYDROCHLORIDE 0.4 MG: 0.4 CAPSULE ORAL at 21:06

## 2021-10-12 RX ADMIN — ACETAMINOPHEN 975 MG: 325 TABLET, FILM COATED ORAL at 13:01

## 2021-10-12 RX ADMIN — BISACODYL 5 MG: 5 TABLET, COATED ORAL at 09:09

## 2021-10-12 RX ADMIN — POTASSIUM CHLORIDE 40 MEQ: 1500 TABLET, EXTENDED RELEASE ORAL at 09:09

## 2021-10-12 RX ADMIN — AMLODIPINE BESYLATE 5 MG: 5 TABLET ORAL at 09:09

## 2021-10-12 RX ADMIN — DOCUSATE SODIUM AND SENNOSIDES 1 TABLET: 8.6; 5 TABLET ORAL at 17:49

## 2021-10-12 RX ADMIN — DOCUSATE SODIUM AND SENNOSIDES 1 TABLET: 8.6; 5 TABLET ORAL at 09:08

## 2021-10-12 RX ADMIN — GABAPENTIN 100 MG: 100 CAPSULE ORAL at 21:04

## 2021-10-12 RX ADMIN — ACETAMINOPHEN 975 MG: 325 TABLET, FILM COATED ORAL at 21:04

## 2021-10-12 RX ADMIN — Medication 12.5 MG: at 21:06

## 2021-10-12 RX ADMIN — Medication 12.5 MG: at 09:08

## 2021-10-12 RX ADMIN — ENOXAPARIN SODIUM 30 MG: 30 INJECTION SUBCUTANEOUS at 21:06

## 2021-10-12 RX ADMIN — ACETAMINOPHEN 975 MG: 325 TABLET, FILM COATED ORAL at 06:43

## 2021-10-12 RX ADMIN — ENOXAPARIN SODIUM 30 MG: 30 INJECTION SUBCUTANEOUS at 09:08

## 2021-10-13 PROCEDURE — 99232 SBSQ HOSP IP/OBS MODERATE 35: CPT | Performed by: INTERNAL MEDICINE

## 2021-10-13 PROCEDURE — 97116 GAIT TRAINING THERAPY: CPT

## 2021-10-13 PROCEDURE — 99232 SBSQ HOSP IP/OBS MODERATE 35: CPT | Performed by: SURGERY

## 2021-10-13 PROCEDURE — 97535 SELF CARE MNGMENT TRAINING: CPT

## 2021-10-13 PROCEDURE — 97530 THERAPEUTIC ACTIVITIES: CPT

## 2021-10-13 PROCEDURE — 99024 POSTOP FOLLOW-UP VISIT: CPT | Performed by: PHYSICIAN ASSISTANT

## 2021-10-13 PROCEDURE — 97110 THERAPEUTIC EXERCISES: CPT

## 2021-10-13 RX ADMIN — ACETAMINOPHEN 975 MG: 325 TABLET, FILM COATED ORAL at 13:13

## 2021-10-13 RX ADMIN — POTASSIUM CHLORIDE 40 MEQ: 1500 TABLET, EXTENDED RELEASE ORAL at 09:05

## 2021-10-13 RX ADMIN — ENOXAPARIN SODIUM 30 MG: 30 INJECTION SUBCUTANEOUS at 09:05

## 2021-10-13 RX ADMIN — DOCUSATE SODIUM AND SENNOSIDES 1 TABLET: 8.6; 5 TABLET ORAL at 18:06

## 2021-10-13 RX ADMIN — ENOXAPARIN SODIUM 30 MG: 30 INJECTION SUBCUTANEOUS at 22:15

## 2021-10-13 RX ADMIN — GABAPENTIN 100 MG: 100 CAPSULE ORAL at 22:15

## 2021-10-13 RX ADMIN — Medication 12.5 MG: at 09:05

## 2021-10-13 RX ADMIN — TAMSULOSIN HYDROCHLORIDE 0.4 MG: 0.4 CAPSULE ORAL at 22:15

## 2021-10-13 RX ADMIN — ACETAMINOPHEN 975 MG: 325 TABLET, FILM COATED ORAL at 05:19

## 2021-10-13 RX ADMIN — AMLODIPINE BESYLATE 5 MG: 5 TABLET ORAL at 09:05

## 2021-10-13 RX ADMIN — ACETAMINOPHEN 975 MG: 325 TABLET, FILM COATED ORAL at 22:15

## 2021-10-13 RX ADMIN — DOCUSATE SODIUM AND SENNOSIDES 1 TABLET: 8.6; 5 TABLET ORAL at 09:05

## 2021-10-14 VITALS
RESPIRATION RATE: 16 BRPM | TEMPERATURE: 97.7 F | DIASTOLIC BLOOD PRESSURE: 62 MMHG | BODY MASS INDEX: 27.79 KG/M2 | SYSTOLIC BLOOD PRESSURE: 126 MMHG | HEIGHT: 73 IN | WEIGHT: 209.66 LBS | HEART RATE: 61 BPM | OXYGEN SATURATION: 95 %

## 2021-10-14 PROCEDURE — 97116 GAIT TRAINING THERAPY: CPT

## 2021-10-14 PROCEDURE — 97530 THERAPEUTIC ACTIVITIES: CPT

## 2021-10-14 PROCEDURE — NC001 PR NO CHARGE: Performed by: SURGERY

## 2021-10-14 PROCEDURE — 99232 SBSQ HOSP IP/OBS MODERATE 35: CPT | Performed by: INTERNAL MEDICINE

## 2021-10-14 PROCEDURE — 99238 HOSP IP/OBS DSCHRG MGMT 30/<: CPT | Performed by: SURGERY

## 2021-10-14 PROCEDURE — 99024 POSTOP FOLLOW-UP VISIT: CPT | Performed by: PHYSICIAN ASSISTANT

## 2021-10-14 PROCEDURE — 97110 THERAPEUTIC EXERCISES: CPT

## 2021-10-14 RX ORDER — GABAPENTIN 100 MG/1
100 CAPSULE ORAL
Refills: 0
Start: 2021-10-14

## 2021-10-14 RX ORDER — AMOXICILLIN 250 MG
1 CAPSULE ORAL 2 TIMES DAILY
Refills: 0
Start: 2021-10-14

## 2021-10-14 RX ORDER — POLYETHYLENE GLYCOL 3350 17 G/17G
17 POWDER, FOR SOLUTION ORAL DAILY PRN
Status: DISCONTINUED | OUTPATIENT
Start: 2021-10-14 | End: 2021-10-14 | Stop reason: HOSPADM

## 2021-10-14 RX ORDER — ACETAMINOPHEN 325 MG/1
975 TABLET ORAL EVERY 8 HOURS SCHEDULED
Refills: 0
Start: 2021-10-14

## 2021-10-14 RX ADMIN — ACETAMINOPHEN 975 MG: 325 TABLET, FILM COATED ORAL at 13:11

## 2021-10-14 RX ADMIN — POLYETHYLENE GLYCOL 3350 17 G: 17 POWDER, FOR SOLUTION ORAL at 10:42

## 2021-10-14 RX ADMIN — ACETAMINOPHEN 975 MG: 325 TABLET, FILM COATED ORAL at 05:01

## 2021-10-14 RX ADMIN — Medication 12.5 MG: at 08:59

## 2021-10-14 RX ADMIN — ENOXAPARIN SODIUM 30 MG: 30 INJECTION SUBCUTANEOUS at 08:59

## 2021-10-15 ENCOUNTER — NURSING HOME VISIT (OUTPATIENT)
Dept: GERIATRICS | Facility: OTHER | Age: 86
End: 2021-10-15
Payer: COMMERCIAL

## 2021-10-15 ENCOUNTER — TELEPHONE (OUTPATIENT)
Dept: OBGYN CLINIC | Facility: HOSPITAL | Age: 86
End: 2021-10-15

## 2021-10-15 ENCOUNTER — TRANSITIONAL CARE MANAGEMENT (OUTPATIENT)
Dept: FAMILY MEDICINE CLINIC | Facility: CLINIC | Age: 86
End: 2021-10-15

## 2021-10-15 DIAGNOSIS — R39.12 BENIGN PROSTATIC HYPERPLASIA WITH WEAK URINARY STREAM: ICD-10-CM

## 2021-10-15 DIAGNOSIS — K59.00 CONSTIPATION, UNSPECIFIED CONSTIPATION TYPE: ICD-10-CM

## 2021-10-15 DIAGNOSIS — N40.1 BENIGN PROSTATIC HYPERPLASIA WITH WEAK URINARY STREAM: ICD-10-CM

## 2021-10-15 DIAGNOSIS — Z78.9 FULL CODE STATUS: ICD-10-CM

## 2021-10-15 DIAGNOSIS — I10 HYPERTENSION, UNSPECIFIED TYPE: ICD-10-CM

## 2021-10-15 DIAGNOSIS — E87.6 DIURETIC-INDUCED HYPOKALEMIA: ICD-10-CM

## 2021-10-15 DIAGNOSIS — Z95.0 PRESENCE OF CARDIAC PACEMAKER: ICD-10-CM

## 2021-10-15 DIAGNOSIS — T50.2X5A DIURETIC-INDUCED HYPOKALEMIA: ICD-10-CM

## 2021-10-15 DIAGNOSIS — S72.001D CLOSED FRACTURE OF NECK OF RIGHT FEMUR WITH ROUTINE HEALING: Primary | ICD-10-CM

## 2021-10-15 PROCEDURE — 99306 1ST NF CARE HIGH MDM 50: CPT | Performed by: INTERNAL MEDICINE

## 2021-10-16 PROBLEM — S01.01XA LACERATION OF SCALP: Status: RESOLVED | Noted: 2019-11-19 | Resolved: 2021-10-16

## 2021-10-16 PROBLEM — T50.2X5A DIURETIC-INDUCED HYPOKALEMIA: Status: ACTIVE | Noted: 2021-10-16

## 2021-10-16 PROBLEM — K59.00 CONSTIPATION: Status: ACTIVE | Noted: 2021-10-16

## 2021-10-16 PROBLEM — Z95.0 PRESENCE OF CARDIAC PACEMAKER: Status: ACTIVE | Noted: 2021-10-16

## 2021-10-16 PROBLEM — E87.6 DIURETIC-INDUCED HYPOKALEMIA: Status: ACTIVE | Noted: 2021-10-16

## 2021-10-16 PROBLEM — N48.1 BALANITIS: Status: RESOLVED | Noted: 2020-01-02 | Resolved: 2021-10-16

## 2021-10-16 RX ORDER — POLYETHYLENE GLYCOL 3350 17 G/17G
17 POWDER, FOR SOLUTION ORAL DAILY
COMMUNITY
Start: 2021-10-15

## 2021-10-22 ENCOUNTER — NURSING HOME VISIT (OUTPATIENT)
Dept: GERIATRICS | Facility: OTHER | Age: 86
End: 2021-10-22
Payer: COMMERCIAL

## 2021-10-22 VITALS
TEMPERATURE: 98.5 F | HEART RATE: 72 BPM | SYSTOLIC BLOOD PRESSURE: 122 MMHG | BODY MASS INDEX: 26.32 KG/M2 | DIASTOLIC BLOOD PRESSURE: 68 MMHG | WEIGHT: 199.5 LBS

## 2021-10-22 DIAGNOSIS — S72.001D CLOSED FRACTURE OF NECK OF RIGHT FEMUR WITH ROUTINE HEALING: ICD-10-CM

## 2021-10-22 DIAGNOSIS — N40.1 BENIGN PROSTATIC HYPERPLASIA WITH WEAK URINARY STREAM: ICD-10-CM

## 2021-10-22 DIAGNOSIS — R39.12 BENIGN PROSTATIC HYPERPLASIA WITH WEAK URINARY STREAM: ICD-10-CM

## 2021-10-22 DIAGNOSIS — I10 HYPERTENSION, UNSPECIFIED TYPE: Primary | ICD-10-CM

## 2021-10-22 PROCEDURE — 99309 SBSQ NF CARE MODERATE MDM 30: CPT | Performed by: PHYSICIAN ASSISTANT

## 2021-10-29 ENCOUNTER — OFFICE VISIT (OUTPATIENT)
Dept: OBGYN CLINIC | Facility: CLINIC | Age: 86
End: 2021-10-29

## 2021-10-29 ENCOUNTER — APPOINTMENT (OUTPATIENT)
Dept: RADIOLOGY | Facility: CLINIC | Age: 86
End: 2021-10-29
Payer: COMMERCIAL

## 2021-10-29 ENCOUNTER — NURSING HOME VISIT (OUTPATIENT)
Dept: GERIATRICS | Facility: OTHER | Age: 86
End: 2021-10-29
Payer: COMMERCIAL

## 2021-10-29 VITALS
BODY MASS INDEX: 25.71 KG/M2 | DIASTOLIC BLOOD PRESSURE: 59 MMHG | WEIGHT: 194.9 LBS | HEART RATE: 59 BPM | SYSTOLIC BLOOD PRESSURE: 114 MMHG | TEMPERATURE: 98 F

## 2021-10-29 VITALS
DIASTOLIC BLOOD PRESSURE: 82 MMHG | HEIGHT: 73 IN | BODY MASS INDEX: 25.71 KG/M2 | WEIGHT: 194 LBS | SYSTOLIC BLOOD PRESSURE: 124 MMHG

## 2021-10-29 DIAGNOSIS — M48.00 SPINAL STENOSIS, UNSPECIFIED SPINAL REGION: ICD-10-CM

## 2021-10-29 DIAGNOSIS — I10 HYPERTENSION, UNSPECIFIED TYPE: Primary | ICD-10-CM

## 2021-10-29 DIAGNOSIS — Z47.89 AFTERCARE FOLLOWING SURGERY OF THE MUSCULOSKELETAL SYSTEM: Primary | ICD-10-CM

## 2021-10-29 DIAGNOSIS — S72.001D CLOSED FRACTURE OF NECK OF RIGHT FEMUR WITH ROUTINE HEALING: ICD-10-CM

## 2021-10-29 DIAGNOSIS — R39.12 BENIGN PROSTATIC HYPERPLASIA WITH WEAK URINARY STREAM: ICD-10-CM

## 2021-10-29 DIAGNOSIS — N40.1 BENIGN PROSTATIC HYPERPLASIA WITH WEAK URINARY STREAM: ICD-10-CM

## 2021-10-29 DIAGNOSIS — Z47.89 AFTERCARE FOLLOWING SURGERY OF THE MUSCULOSKELETAL SYSTEM: ICD-10-CM

## 2021-10-29 PROCEDURE — 73502 X-RAY EXAM HIP UNI 2-3 VIEWS: CPT

## 2021-10-29 PROCEDURE — 99024 POSTOP FOLLOW-UP VISIT: CPT | Performed by: ORTHOPAEDIC SURGERY

## 2021-10-29 PROCEDURE — 99316 NF DSCHRG MGMT 30 MIN+: CPT | Performed by: PHYSICIAN ASSISTANT

## 2021-11-01 ENCOUNTER — TRANSITIONAL CARE MANAGEMENT (OUTPATIENT)
Dept: FAMILY MEDICINE CLINIC | Facility: CLINIC | Age: 86
End: 2021-11-01

## 2021-11-04 DIAGNOSIS — I10 ESSENTIAL (PRIMARY) HYPERTENSION: ICD-10-CM

## 2021-11-04 RX ORDER — HYDROCHLOROTHIAZIDE 12.5 MG/1
CAPSULE, GELATIN COATED ORAL
Qty: 30 CAPSULE | Refills: 0 | Status: SHIPPED | OUTPATIENT
Start: 2021-11-04 | End: 2021-12-08

## 2021-11-04 RX ORDER — POTASSIUM CHLORIDE 20 MEQ/1
TABLET, EXTENDED RELEASE ORAL
Qty: 60 TABLET | Refills: 0 | Status: SHIPPED | OUTPATIENT
Start: 2021-11-04 | End: 2021-12-08

## 2021-11-18 ENCOUNTER — TELEPHONE (OUTPATIENT)
Dept: OBGYN CLINIC | Facility: HOSPITAL | Age: 86
End: 2021-11-18

## 2021-12-08 DIAGNOSIS — I10 ESSENTIAL (PRIMARY) HYPERTENSION: ICD-10-CM

## 2021-12-08 RX ORDER — HYDROCHLOROTHIAZIDE 12.5 MG/1
CAPSULE, GELATIN COATED ORAL
Qty: 30 CAPSULE | Refills: 0 | Status: SHIPPED | OUTPATIENT
Start: 2021-12-08 | End: 2022-01-03

## 2021-12-08 RX ORDER — POTASSIUM CHLORIDE 20 MEQ/1
TABLET, EXTENDED RELEASE ORAL
Qty: 60 TABLET | Refills: 0 | Status: SHIPPED | OUTPATIENT
Start: 2021-12-08 | End: 2022-01-03

## 2021-12-16 ENCOUNTER — REMOTE DEVICE CLINIC VISIT (OUTPATIENT)
Dept: CARDIOLOGY CLINIC | Facility: CLINIC | Age: 86
End: 2021-12-16
Payer: COMMERCIAL

## 2021-12-16 DIAGNOSIS — Z95.0 CARDIAC PACEMAKER IN SITU: Primary | ICD-10-CM

## 2021-12-16 PROCEDURE — 93296 REM INTERROG EVL PM/IDS: CPT | Performed by: INTERNAL MEDICINE

## 2021-12-16 PROCEDURE — 93294 REM INTERROG EVL PM/LDLS PM: CPT | Performed by: INTERNAL MEDICINE

## 2022-01-03 DIAGNOSIS — K59.00 CONSTIPATION: ICD-10-CM

## 2022-01-03 DIAGNOSIS — I10 ESSENTIAL (PRIMARY) HYPERTENSION: ICD-10-CM

## 2022-01-03 RX ORDER — POTASSIUM CHLORIDE 20 MEQ/1
TABLET, EXTENDED RELEASE ORAL
Qty: 60 TABLET | Refills: 0 | Status: SHIPPED | OUTPATIENT
Start: 2022-01-03 | End: 2022-02-02

## 2022-01-03 RX ORDER — POLYETHYLENE GLYCOL 3350 17 G/17G
POWDER, FOR SOLUTION ORAL
Qty: 510 G | Refills: 5 | Status: SHIPPED | OUTPATIENT
Start: 2022-01-03 | End: 2022-07-07

## 2022-01-03 RX ORDER — HYDROCHLOROTHIAZIDE 12.5 MG/1
CAPSULE, GELATIN COATED ORAL
Qty: 30 CAPSULE | Refills: 0 | Status: SHIPPED | OUTPATIENT
Start: 2022-01-03 | End: 2022-02-02

## 2022-01-27 ENCOUNTER — APPOINTMENT (OUTPATIENT)
Dept: RADIOLOGY | Facility: CLINIC | Age: 87
End: 2022-01-27
Payer: COMMERCIAL

## 2022-01-27 ENCOUNTER — OFFICE VISIT (OUTPATIENT)
Dept: OBGYN CLINIC | Facility: CLINIC | Age: 87
End: 2022-01-27
Payer: COMMERCIAL

## 2022-01-27 VITALS
WEIGHT: 205 LBS | BODY MASS INDEX: 27.17 KG/M2 | SYSTOLIC BLOOD PRESSURE: 130 MMHG | DIASTOLIC BLOOD PRESSURE: 78 MMHG | HEIGHT: 73 IN

## 2022-01-27 DIAGNOSIS — Z47.89 AFTERCARE FOLLOWING SURGERY OF THE MUSCULOSKELETAL SYSTEM: ICD-10-CM

## 2022-01-27 DIAGNOSIS — Z47.89 AFTERCARE FOLLOWING SURGERY OF THE MUSCULOSKELETAL SYSTEM: Primary | ICD-10-CM

## 2022-01-27 PROCEDURE — 1160F RVW MEDS BY RX/DR IN RCRD: CPT | Performed by: ORTHOPAEDIC SURGERY

## 2022-01-27 PROCEDURE — 99213 OFFICE O/P EST LOW 20 MIN: CPT | Performed by: ORTHOPAEDIC SURGERY

## 2022-01-27 PROCEDURE — 73502 X-RAY EXAM HIP UNI 2-3 VIEWS: CPT

## 2022-01-27 NOTE — ASSESSMENT & PLAN NOTE
Patient is doing well status post right hip hemiarthroplasty  X-rays were reviewed with the patient  Prosthesis is well aligned  Prescription given for rollator walker that may be more comfortable  Discussed he is to take antibiotics prior to all dental procedures lifelong  Follow up in one year with repeat x-rays  All questions were answered to patient's satisfaction

## 2022-01-27 NOTE — PROGRESS NOTES
Assessment:     1  Aftercare following surgery of the musculoskeletal system        Plan:  The patient was seen and examined by Dr Wilfredo Dewitt and myself  Problem List Items Addressed This Visit        Other    Aftercare following surgery of the musculoskeletal system - Primary      Patient is doing well status post right hip hemiarthroplasty  X-rays were reviewed with the patient  Prosthesis is well aligned  Prescription given for rollator walker that may be more comfortable  Discussed he is to take antibiotics prior to all dental procedures lifelong  Follow up in one year with repeat x-rays  All questions were answered to patient's satisfaction  Relevant Orders    XR hip/pelv 2-3 vws right if performed    Walker         Subjective:     Patient ID: Ashley Cuba is a 80 y o  male  Chief Complaint: This is an 28-year-old white male who is status post right hip hemiarthroplasty on October 10, 2021  He arrives in a wheelchair  Accompanied by his son today  He is currently living at  He states he does not walk much due to history of lumbar stenosis  He does get up for transfers and uses a walker  He complains that his walker is too short  He denies any pain in the right hip      Allergy:  No Known Allergies  Medications:  all current active meds have been reviewed  Past Medical History:  Past Medical History:   Diagnosis Date    Balanitis 1/2/2020    Basal cell carcinoma     Bradycardia     Degenerative joint disease (DJD) of lumbar spine     Hypertension     Kidney stones     Pacemaker      Past Surgical History:  Past Surgical History:   Procedure Laterality Date    CARDIAC PACEMAKER PLACEMENT      CHOLECYSTECTOMY      EYE SURGERY      AK PARTIAL HIP REPLACEMENT Right 10/10/2021    Procedure: HEMIARTHROPLASTY HIP (BIPOLAR), RIGHT;  Surgeon: Lara Hargrove MD;  [facility];  Service: Orthopedics    TONSILLECTOMY       Family History:  Family History   Problem Relation Age of Onset    Diabetes Mother     Heart disease Mother      Social History:  Social History     Substance and Sexual Activity   Alcohol Use Not Currently    Comment: occasionally; Per [employer]  No Alcohol use     Social History     Substance and Sexual Activity   Drug Use No     Social History     Tobacco Use   Smoking Status Never Smoker   Smokeless Tobacco Never Used     Review of Systems   Constitutional: Negative  HENT: Negative  Eyes: Negative  Respiratory: Negative  Cardiovascular: Negative  Gastrointestinal: Negative  Endocrine: Negative  Genitourinary: Negative  Musculoskeletal: Positive for gait problem (in a wheelchair)  Negative for arthralgias  Skin: Negative  Hematological: Negative  Psychiatric/Behavioral: Negative  Objective:  BP Readings from Last 1 Encounters:   01/27/22 130/78      Wt Readings from Last 1 Encounters:   01/27/22 93 kg (205 lb)      BMI:   Estimated body mass index is 27 05 kg/m² as calculated from the following:    Height as of this encounter: 6' 1" (1 854 m)  Weight as of this encounter: 93 kg (205 lb)  BSA:   Estimated body surface area is 2 17 meters squared as calculated from the following:    Height as of this encounter: 6' 1" (1 854 m)  Weight as of this encounter: 93 kg (205 lb)  Physical Exam  Constitutional:       General: He is not in acute distress  Appearance: He is well-developed  HENT:      Head: Normocephalic  Eyes:      Conjunctiva/sclera: Conjunctivae normal       Pupils: Pupils are equal, round, and reactive to light  Pulmonary:      Effort: Pulmonary effort is normal  No respiratory distress  Skin:     General: Skin is warm and dry  Neurological:      Mental Status: He is alert and oriented to person, place, and time     Psychiatric:         Behavior: Behavior normal        Right Hip Exam     Other   Erythema: absent  Scars: present (Well healed incision with no evidence of infection  )  Sensation: normal  Pulse: present    Comments:    Good passive range of motion of hip with no increased pain              I have personally reviewed pertinent films in PACS and my interpretation is X-rays of the right hip reveal a well-aligned prosthesis with no evidence of loosening

## 2022-02-02 DIAGNOSIS — I10 ESSENTIAL (PRIMARY) HYPERTENSION: ICD-10-CM

## 2022-02-02 RX ORDER — POTASSIUM CHLORIDE 20 MEQ/1
TABLET, EXTENDED RELEASE ORAL
Qty: 60 TABLET | Refills: 0 | Status: SHIPPED | OUTPATIENT
Start: 2022-02-02 | End: 2022-03-09

## 2022-02-02 RX ORDER — HYDROCHLOROTHIAZIDE 12.5 MG/1
CAPSULE, GELATIN COATED ORAL
Qty: 30 CAPSULE | Refills: 0 | Status: SHIPPED | OUTPATIENT
Start: 2022-02-02 | End: 2022-03-09

## 2022-03-09 DIAGNOSIS — I47.1 SVT (SUPRAVENTRICULAR TACHYCARDIA) (HCC): ICD-10-CM

## 2022-03-09 DIAGNOSIS — I10 ESSENTIAL (PRIMARY) HYPERTENSION: ICD-10-CM

## 2022-03-09 RX ORDER — POTASSIUM CHLORIDE 20 MEQ/1
TABLET, EXTENDED RELEASE ORAL
Qty: 60 TABLET | Refills: 0 | Status: SHIPPED | OUTPATIENT
Start: 2022-03-09 | End: 2022-04-06

## 2022-03-09 RX ORDER — HYDROCHLOROTHIAZIDE 12.5 MG/1
CAPSULE, GELATIN COATED ORAL
Qty: 30 CAPSULE | Refills: 0 | Status: SHIPPED | OUTPATIENT
Start: 2022-03-09 | End: 2022-04-06

## 2022-04-06 DIAGNOSIS — I10 ESSENTIAL (PRIMARY) HYPERTENSION: ICD-10-CM

## 2022-04-06 RX ORDER — POTASSIUM CHLORIDE 20 MEQ/1
TABLET, EXTENDED RELEASE ORAL
Qty: 60 TABLET | Refills: 0 | Status: SHIPPED | OUTPATIENT
Start: 2022-04-06 | End: 2022-05-04

## 2022-04-06 RX ORDER — HYDROCHLOROTHIAZIDE 12.5 MG/1
CAPSULE, GELATIN COATED ORAL
Qty: 30 CAPSULE | Refills: 0 | Status: SHIPPED | OUTPATIENT
Start: 2022-04-06 | End: 2022-05-04

## 2022-04-06 NOTE — TELEPHONE ENCOUNTER
Requested medication(s) are due for refill today: Yes  Patient has already received a courtesy refill: No  Other reason request has been forwarded to provider:   Cardiovascular: Diuretics - Thiazide Failed 04/06/2022 03:32 PM   Protocol Details  Ca in normal range and within 360 days    Valid encounter within last 6 months

## 2022-05-04 DIAGNOSIS — I10 ESSENTIAL (PRIMARY) HYPERTENSION: ICD-10-CM

## 2022-05-04 RX ORDER — HYDROCHLOROTHIAZIDE 12.5 MG/1
CAPSULE, GELATIN COATED ORAL
Qty: 30 CAPSULE | Refills: 0 | Status: SHIPPED | OUTPATIENT
Start: 2022-05-04 | End: 2022-06-08

## 2022-05-04 RX ORDER — POTASSIUM CHLORIDE 20 MEQ/1
TABLET, EXTENDED RELEASE ORAL
Qty: 60 TABLET | Refills: 0 | Status: SHIPPED | OUTPATIENT
Start: 2022-05-04 | End: 2022-06-08

## 2022-06-03 ENCOUNTER — OFFICE VISIT (OUTPATIENT)
Dept: FAMILY MEDICINE CLINIC | Facility: CLINIC | Age: 87
End: 2022-06-03
Payer: COMMERCIAL

## 2022-06-03 VITALS
TEMPERATURE: 97.5 F | WEIGHT: 204.25 LBS | BODY MASS INDEX: 27.07 KG/M2 | SYSTOLIC BLOOD PRESSURE: 128 MMHG | HEART RATE: 65 BPM | OXYGEN SATURATION: 98 % | DIASTOLIC BLOOD PRESSURE: 76 MMHG | HEIGHT: 73 IN | RESPIRATION RATE: 18 BRPM

## 2022-06-03 DIAGNOSIS — R26.2 AMBULATORY DYSFUNCTION: Primary | ICD-10-CM

## 2022-06-03 DIAGNOSIS — I10 HYPERTENSION, UNSPECIFIED TYPE: ICD-10-CM

## 2022-06-03 PROCEDURE — 1170F FXNL STATUS ASSESSED: CPT | Performed by: FAMILY MEDICINE

## 2022-06-03 PROCEDURE — G0439 PPPS, SUBSEQ VISIT: HCPCS | Performed by: FAMILY MEDICINE

## 2022-06-03 PROCEDURE — 99213 OFFICE O/P EST LOW 20 MIN: CPT | Performed by: FAMILY MEDICINE

## 2022-06-03 PROCEDURE — 3725F SCREEN DEPRESSION PERFORMED: CPT | Performed by: FAMILY MEDICINE

## 2022-06-03 PROCEDURE — 3288F FALL RISK ASSESSMENT DOCD: CPT | Performed by: FAMILY MEDICINE

## 2022-06-03 NOTE — PATIENT INSTRUCTIONS
Medicare Preventive Visit Patient Instructions  Thank you for completing your Welcome to Medicare Visit or Medicare Annual Wellness Visit today  Your next wellness visit will be due in one year (6/4/2023)  The screening/preventive services that you may require over the next 5-10 years are detailed below  Some tests may not apply to you based off risk factors and/or age  Screening tests ordered at today's visit but not completed yet may show as past due  Also, please note that scanned in results may not display below  Preventive Screenings:  Service Recommendations Previous Testing/Comments   Colorectal Cancer Screening  · Colonoscopy    · Fecal Occult Blood Test (FOBT)/Fecal Immunochemical Test (FIT)  · Fecal DNA/Cologuard Test  · Flexible Sigmoidoscopy Age: 54-65 years old   Colonoscopy: every 10 years (May be performed more frequently if at higher risk)  OR  FOBT/FIT: every 1 year  OR  Cologuard: every 3 years  OR  Sigmoidoscopy: every 5 years  Screening may be recommended earlier than age 48 if at higher risk for colorectal cancer  Also, an individualized decision between you and your healthcare provider will decide whether screening between the ages of 74-80 would be appropriate   Colonoscopy: Not on file  FOBT/FIT: Not on file  Cologuard: Not on file  Sigmoidoscopy: Not on file    Screening Not Indicated     Prostate Cancer Screening Individualized decision between patient and health care provider in men between ages of 53-78   Medicare will cover every 12 months beginning on the day after your 50th birthday PSA: 2 0 ng/mL     Screening Not Indicated     Hepatitis C Screening Once for adults born between 1945 and 1965  More frequently in patients at high risk for Hepatitis C Hep C Antibody: Not on file        Diabetes Screening 1-2 times per year if you're at risk for diabetes or have pre-diabetes Fasting glucose: 100 mg/dL   A1C: No results in last 5 years    Screening Current   Cholesterol Screening Once every 5 years if you don't have a lipid disorder  May order more often based on risk factors  Lipid panel: 04/05/2021    Screening Current      Other Preventive Screenings Covered by Medicare:  1  Abdominal Aortic Aneurysm (AAA) Screening: covered once if your at risk  You're considered to be at risk if you have a family history of AAA or a male between the age of 73-68 who smoking at least 100 cigarettes in your lifetime  2  Lung Cancer Screening: covers low dose CT scan once per year if you meet all of the following conditions: (1) Age 50-69; (2) No signs or symptoms of lung cancer; (3) Current smoker or have quit smoking within the last 15 years; (4) You have a tobacco smoking history of at least 30 pack years (packs per day x number of years you smoked); (5) You get a written order from a healthcare provider  3  Glaucoma Screening: covered annually if you're considered high risk: (1) You have diabetes OR (2) Family history of glaucoma OR (3)  aged 48 and older OR (3)  American aged 72 and older  3  Osteoporosis Screening: covered every 2 years if you meet one of the following conditions: (1) Have a vertebral abnormality; (2) On glucocorticoid therapy for more than 3 months; (3) Have primary hyperparathyroidism; (4) On osteoporosis medications and need to assess response to drug therapy  5  HIV Screening: covered annually if you're between the age of 12-76  Also covered annually if you are younger than 13 and older than 72 with risk factors for HIV infection  For pregnant patients, it is covered up to 3 times per pregnancy      Immunizations:  Immunization Recommendations   Influenza Vaccine Annual influenza vaccination during flu season is recommended for all persons aged >= 6 months who do not have contraindications   Pneumococcal Vaccine (Prevnar and Pneumovax)  * Prevnar = PCV13  * Pneumovax = PPSV23 Adults 25-60 years old: 1-3 doses may be recommended based on certain risk factors  Adults 72 years old: Prevnar (PCV13) vaccine recommended followed by Pneumovax (PPSV23) vaccine  If already received PPSV23 since turning 65, then PCV13 recommended at least one year after PPSV23 dose  Hepatitis B Vaccine 3 dose series if at intermediate or high risk (ex: diabetes, end stage renal disease, liver disease)   Tetanus (Td) Vaccine - COST NOT COVERED BY MEDICARE PART B Following completion of primary series, a booster dose should be given every 10 years to maintain immunity against tetanus  Td may also be given as tetanus wound prophylaxis  Tdap Vaccine - COST NOT COVERED BY MEDICARE PART B Recommended at least once for all adults  For pregnant patients, recommended with each pregnancy  Shingles Vaccine (Shingrix) - COST NOT COVERED BY MEDICARE PART B  2 shot series recommended in those aged 48 and above     Health Maintenance Due:  There are no preventive care reminders to display for this patient  Immunizations Due:      Topic Date Due    COVID-19 Vaccine (3 - Booster for [employer] series) 08/04/2021     Advance Directives   What are advance directives? Advance directives are legal documents that state your wishes and plans for medical care  These plans are made ahead of time in case you lose your ability to make decisions for yourself  Advance directives can apply to any medical decision, such as the treatments you want, and if you want to donate organs  What are the types of advance directives? There are many types of advance directives, and each state has rules about how to use them  You may choose a combination of any of the following:  · Living will: This is a written record of the treatment you want  You can also choose which treatments you do not want, which to limit, and which to stop at a certain time  This includes surgery, medicine, IV fluid, and tube feedings  · Durable power of  for healthcare [facility]):   This is a written record that states who you want to make healthcare choices for you when you are unable to make them for yourself  This person, called a proxy, is usually a family member or a friend  You may choose more than 1 proxy  · Do not resuscitate (DNR) order:  A DNR order is used in case your heart stops beating or you stop breathing  It is a request not to have certain forms of treatment, such as CPR  A DNR order may be included in other types of advance directives  · Medical directive: This covers the care that you want if you are in a coma, near death, or unable to make decisions for yourself  You can list the treatments you want for each condition  Treatment may include pain medicine, surgery, blood transfusions, dialysis, IV or tube feedings, and a ventilator (breathing machine)  · Values history: This document has questions about your views, beliefs, and how you feel and think about life  This information can help others choose the care that you would choose  Why are advance directives important? An advance directive helps you control your care  Although spoken wishes may be used, it is better to have your wishes written down  Spoken wishes can be misunderstood, or not followed  Treatments may be given even if you do not want them  An advance directive may make it easier for your family to make difficult choices about your care  Fall Prevention    Fall prevention  includes ways to make your home and other areas safer  It also includes ways you can move more carefully to prevent a fall  Health conditions that cause changes in your blood pressure, vision, or muscle strength and coordination may increase your risk for falls  Medicines may also increase your risk for falls if they make you dizzy, weak, or sleepy  Fall prevention tips:   · Stand or sit up slowly  · Use assistive devices as directed  · Wear shoes that fit well and have soles that   · Wear a personal alarm  · Stay active  · Manage your medical conditions      Home Safety Tips:  · Add items to prevent falls in the bathroom  · Keep paths clear  · Install bright lights in your home  · Keep items you use often on shelves within reach  · Paint or place reflective tape on the edges of your stairs  Weight Management   Why it is important to manage your weight:  Being overweight increases your risk of health conditions such as heart disease, high blood pressure, type 2 diabetes, and certain types of cancer  It can also increase your risk for osteoarthritis, sleep apnea, and other respiratory problems  Aim for a slow, steady weight loss  Even a small amount of weight loss can lower your risk of health problems  How to lose weight safely:  A safe and healthy way to lose weight is to eat fewer calories and get regular exercise  You can lose up about 1 pound a week by decreasing the number of calories you eat by 500 calories each day  Healthy meal plan for weight management:  A healthy meal plan includes a variety of foods, contains fewer calories, and helps you stay healthy  A healthy meal plan includes the following:  · Eat whole-grain foods more often  A healthy meal plan should contain fiber  Fiber is the part of grains, fruits, and vegetables that is not broken down by your body  Whole-grain foods are healthy and provide extra fiber in your diet  Some examples of whole-grain foods are whole-wheat breads and pastas, oatmeal, brown rice, and bulgur  · Eat a variety of vegetables every day  Include dark, leafy greens such as spinach, kale, emeterio greens, and mustard greens  Eat yellow and orange vegetables such as carrots, sweet potatoes, and winter squash  · Eat a variety of fruits every day  Choose fresh or canned fruit (canned in its own juice or light syrup) instead of juice  Fruit juice has very little or no fiber  · Eat low-fat dairy foods  Drink fat-free (skim) milk or 1% milk  Eat fat-free yogurt and low-fat cottage cheese   Try low-fat cheeses such as mozzarella and other reduced-fat cheeses  · Choose meat and other protein foods that are low in fat  Choose beans or other legumes such as split peas or lentils  Choose fish, skinless poultry (chicken or turkey), or lean cuts of red meat (beef or pork)  Before you cook meat or poultry, cut off any visible fat  · Use less fat and oil  Try baking foods instead of frying them  Add less fat, such as margarine, sour cream, regular salad dressing and mayonnaise to foods  Eat fewer high-fat foods  Some examples of high-fat foods include french fries, doughnuts, ice cream, and cakes  · Eat fewer sweets  Limit foods and drinks that are high in sugar  This includes candy, cookies, regular soda, and sweetened drinks  Exercise:  Exercise at least 30 minutes per day on most days of the week  Some examples of exercise include walking, biking, dancing, and swimming  You can also fit in more physical activity by taking the stairs instead of the elevator or parking farther away from stores  Ask your healthcare provider about the best exercise plan for you  © Copyright [employer] 2018 Information is for End User's use only and may not be sold, redistributed or otherwise used for commercial purposes   All illustrations and images included in CareNotes® are the copyrighted property of [employer]  or [address] [place] [address] [employer]

## 2022-06-03 NOTE — PROGRESS NOTES
FAMILY PRACTICE OFFICE VISIT       NAME: Maria Ines Rutherford  AGE: 80 y o  SEX: male       : 1935        MRN: [id number]    DATE: 6/3/2022  TIME: 5:30 PM    Assessment and Plan     Problem List Items Addressed This Visit        Cardiovascular and Mediastinum    Hypertension     Hypertension  Patient blood pressure is stable at this time he will continue current regimen of medications              Other    Ambulatory dysfunction - Primary     Ambulatory dysfunction  Patient with lower extremity weakness  He was given a referral to initiate physical therapy as an outpatient  Relevant Orders    Ambulatory Referral to Physical Therapy              Chief Complaint     Chief Complaint   Patient presents with    Medicare Wellness Visit       History of Present Illness     Patient in the office to review chronic medical condition  He denies any recent illness  He lives alone and has a home health aide that assists him with activities at home twice a week  Patient uses a walker with wheels on a regular basis  He continues to have lower extremity weakness and is status post right hip replacement surgery after having a fall and fracture  Patient has had some falls at home where he was required to contact a neighbor or ambulance service to assist him to get up  He does have a medic alert device that he has had to use in the past       Review of Systems   Review of Systems   Constitutional: Negative  HENT: Negative  Eyes: Negative  Respiratory: Negative  Cardiovascular: Negative  Gastrointestinal: Negative  Genitourinary: Negative  Musculoskeletal: Positive for gait problem  Skin: Negative  Neurological: Positive for weakness  Psychiatric/Behavioral: Negative          Active Problem List     Patient Active Problem List   Diagnosis    Aortic valve disease    Cervical radiculopathy    Hypertension    LVH (left ventricular hypertrophy)    Other malignant neoplasm of unspecified site    Spinal stenosis    Obsessive compulsive disorder    Dysthymic disorder    Gait disturbance    Dry eye    SVT (supraventricular tachycardia) (HCC)    Nocturia    Fall    Closed fracture of neck of right femur with routine healing    Abnormal finding on CT scan    BPH (benign prostatic hyperplasia)    Full code status    Constipation    Diuretic-induced hypokalemia    Presence of cardiac pacemaker    Aftercare following surgery of the musculoskeletal system    Ambulatory dysfunction       Past Medical History:  Past Medical History:   Diagnosis Date    Balanitis 1/2/2020    Basal cell carcinoma     Bradycardia     Degenerative joint disease (DJD) of lumbar spine     Hypertension     Kidney stones     Pacemaker        Past Surgical History:  Past Surgical History:   Procedure Laterality Date    CARDIAC PACEMAKER PLACEMENT      CHOLECYSTECTOMY      EYE SURGERY      [place] PARTIAL HIP REPLACEMENT Right 10/10/2021    Procedure: HEMIARTHROPLASTY HIP (BIPOLAR), RIGHT;  Surgeon: Eric Snyder MD;  [facility];  Service: Orthopedics    TONSILLECTOMY         Family History:  Family History   Problem Relation Age of Onset    Diabetes Mother     Heart disease Mother        Social History:  Social History     Socioeconomic History    Marital status:       Spouse name: Not on file    Number of children: Not on file    Years of education: Not on file    Highest education level: Not on file   Occupational History    Occupation: Retired   Tobacco Use    Smoking status: Never Smoker    Smokeless tobacco: Never Used   Vaping Use    Vaping Use: Never used   Substance and Sexual Activity    Alcohol use: Not Currently     Comment: occasionally; Per [employer]  No Alcohol use    Drug use: No    Sexual activity: Not Currently   Other Topics Concern    Not on file   Social History Narrative    Not on file     Social Determinants of Health     Financial Resource Strain: Not on file   Food Insecurity: Not on file   Transportation Needs: No Transportation Needs    Lack of Transportation (Medical): No    Lack of Transportation (Non-Medical): No   Physical Activity: Not on file   Stress: Not on file   Social Connections: Not on file   Intimate Partner Violence: Not on file   Housing Stability: Not on file       Objective     Vitals:    06/03/22 1425   BP: 128/76   Pulse: 65   Resp: 18   Temp: 97 5 °F (36 4 °C)   SpO2: 98%     Wt Readings from Last 3 Encounters:   06/03/22 92 6 kg (204 lb 4 oz)   01/27/22 93 kg (205 lb)   11/05/21 88 5 kg (195 lb)       Physical Exam  Constitutional:       General: He is not in acute distress  Appearance: Normal appearance  He is not ill-appearing  HENT:      Head: Normocephalic and atraumatic  Eyes:      General:         Right eye: No discharge  Left eye: No discharge  Extraocular Movements: Extraocular movements intact  Conjunctiva/sclera: Conjunctivae normal       Pupils: Pupils are equal, round, and reactive to light  Neck:      Vascular: No carotid bruit  Cardiovascular:      Rate and Rhythm: Normal rate and regular rhythm  Heart sounds: Normal heart sounds  No murmur heard  Pulmonary:      Effort: Pulmonary effort is normal       Breath sounds: Normal breath sounds  No wheezing, rhonchi or rales  Abdominal:      General: Abdomen is flat  Bowel sounds are normal  There is no distension  Palpations: Abdomen is soft  Tenderness: There is no abdominal tenderness  There is no guarding or rebound  Musculoskeletal:      Right lower leg: No edema  Left lower leg: No edema  Comments: Patient ambulates slowly with the use of a walker with wheels  He does have some lower extremity weakness and mild instability with his gait  Lymphadenopathy:      Cervical: No cervical adenopathy  Skin:     Findings: No rash  Neurological:      General: No focal deficit present        Mental Status: He is alert and oriented to person, place, and time  Cranial Nerves: No cranial nerve deficit  Psychiatric:         Mood and Affect: Mood normal          Behavior: Behavior normal          Thought Content:  Thought content normal          Judgment: Judgment normal          Pertinent Laboratory/Diagnostic Studies:  Lab Results   Component Value Date    GLUCOSE 114 04/08/2015    BUN 29 (H) 10/12/2021    CREATININE 0 93 10/12/2021    CALCIUM 8 1 (L) 10/12/2021     04/08/2015    K 4 4 10/12/2021    CO2 24 10/12/2021     10/12/2021     Lab Results   Component Value Date    ALT 31 04/05/2021    AST 22 04/05/2021    ALKPHOS 72 04/05/2021    BILITOT 0 78 04/07/2015       Lab Results   Component Value Date    WBC 14 31 (H) 10/11/2021    HGB 11 6 (L) 10/11/2021    HCT 34 3 (L) 10/11/2021     (H) 10/11/2021     10/11/2021       No results found for: TSH    Lab Results   Component Value Date    CHOL 132 03/24/2014     Lab Results   Component Value Date    TRIG 48 04/05/2021     Lab Results   Component Value Date    HDL 64 04/05/2021     Lab Results   Component Value Date    LDLCALC 61 04/05/2021     No results found for: HGBA1C    Results for orders placed or performed during the hospital encounter of 10/10/21   C[facility], [facility]/B, [facility]    Specimen: Nose; Nares   Result Value Ref Range    SARS-CoV-2 Negative Negative    INFLUENZA A PCR Negative Negative    INFLUENZA B PCR Negative Negative    RSV PCR Negative Negative   Troponin I   Result Value Ref Range    Troponin I <0 02 <=0 04 ng/mL   CBC and differential   Result Value Ref Range    WBC 7 86 4 31 - 10 16 Thousand/uL    RBC 3 67 (L) 3 88 - 5 62 Million/uL    Hemoglobin 12 5 12 0 - 17 0 g/dL    Hematocrit 37 9 36 5 - 49 3 %     (H) 82 - 98 fL    MCH 34 1 26 8 - 34 3 pg    MCHC 33 0 31 4 - 37 4 g/dL    RDW 14 0 11 6 - 15 1 %    MPV 11 0 8 9 - 12 7 fL    Platelets 398 081 - 609 Thousands/uL    nRBC 0 /100 WBCs    Neutrophils Relative 65 43 - 75 %    Immat GRANS % 1 0 - 2 %    Lymphocytes Relative 20 14 - 44 %    Monocytes Relative 9 4 - 12 %    Eosinophils Relative 4 0 - 6 %    Basophils Relative 1 0 - 1 %    Neutrophils Absolute 5 18 1 85 - 7 62 Thousands/µL    Immature Grans Absolute 0 10 0 00 - 0 20 Thousand/uL    Lymphocytes Absolute 1 57 0 60 - 4 47 Thousands/µL    Monocytes Absolute 0 67 0 17 - 1 22 Thousand/µL    Eosinophils Absolute 0 30 0 00 - 0 61 Thousand/µL    Basophils Absolute 0 04 0 00 - 0 10 Thousands/µL   Basic metabolic panel   Result Value Ref Range    Sodium 140 136 - 145 mmol/L    Potassium 4 1 3 5 - 5 3 mmol/L    Chloride 106 100 - 108 mmol/L    CO2 25 21 - 32 mmol/L    ANION GAP 9 4 - 13 mmol/L    BUN 22 5 - 25 mg/dL    Creatinine 0 76 0 60 - 1 30 mg/dL    Glucose 130 65 - 140 mg/dL    Calcium 9 0 8 3 - 10 1 mg/dL    eGFR 83 ml/min/1 73sq m   Protime-INR   Result Value Ref Range    Protime 13 7 11 6 - 14 5 seconds    INR 1 05 0 84 - 1 19   APTT   Result Value Ref Range    PTT 32 23 - 37 seconds   Basic metabolic panel   Result Value Ref Range    Sodium 140 136 - 145 mmol/L    Potassium 4 5 3 5 - 5 3 mmol/L    Chloride 106 100 - 108 mmol/L    CO2 25 21 - 32 mmol/L    ANION GAP 9 4 - 13 mmol/L    BUN 23 5 - 25 mg/dL    Creatinine 0 79 0 60 - 1 30 mg/dL    Glucose 148 (H) 65 - 140 mg/dL    Calcium 7 9 (L) 8 3 - 10 1 mg/dL    eGFR 81 ml/min/1 73sq m   CBC (With Platelets)   Result Value Ref Range    WBC 12 69 (H) 4 31 - 10 16 Thousand/uL    RBC 3 40 (L) 3 88 - 5 62 Million/uL    Hemoglobin 11 4 (L) 12 0 - 17 0 g/dL    Hematocrit 34 0 (L) 36 5 - 49 3 %     (H) 82 - 98 fL    MCH 33 5 26 8 - 34 3 pg    MCHC 33 5 31 4 - 37 4 g/dL    RDW 14 0 11 6 - 15 1 %    Platelets 778 441 - 690 Thousands/uL    MPV 10 5 8 9 - 12 7 fL   CBC and differential   Result Value Ref Range    WBC 14 31 (H) 4 31 - 10 16 Thousand/uL    RBC 3 39 (L) 3 88 - 5 62 Million/uL    Hemoglobin 11 6 (L) 12 0 - 17 0 g/dL    Hematocrit 34 3 (L) 36 5 - 49 3 %     (H) 82 - 98 fL    MCH 34 2 26 8 - 34 3 pg    MCHC 33 8 31 4 - 37 4 g/dL    RDW 13 8 11 6 - 15 1 %    MPV 10 2 8 9 - 12 7 fL    Platelets 896 924 - 995 Thousands/uL    nRBC 0 /100 WBCs    Neutrophils Relative 88 (H) 43 - 75 %    Immat GRANS % 0 0 - 2 %    Lymphocytes Relative 6 (L) 14 - 44 %    Monocytes Relative 6 4 - 12 %    Eosinophils Relative 0 0 - 6 %    Basophils Relative 0 0 - 1 %    Neutrophils Absolute 12 45 (H) 1 85 - 7 62 Thousands/µL    Immature Grans Absolute 0 05 0 00 - 0 20 Thousand/uL    Lymphocytes Absolute 0 89 0 60 - 4 47 Thousands/µL    Monocytes Absolute 0 91 0 17 - 1 22 Thousand/µL    Eosinophils Absolute 0 00 0 00 - 0 61 Thousand/µL    Basophils Absolute 0 01 0 00 - 0 10 Thousands/µL   Basic metabolic panel   Result Value Ref Range    Sodium 137 136 - 145 mmol/L    Potassium 4 4 3 5 - 5 3 mmol/L    Chloride 105 100 - 108 mmol/L    CO2 24 21 - 32 mmol/L    ANION GAP 8 4 - 13 mmol/L    BUN 29 (H) 5 - 25 mg/dL    Creatinine 0 93 0 60 - 1 30 mg/dL    Glucose 135 65 - 140 mg/dL    Calcium 8 1 (L) 8 3 - 10 1 mg/dL    eGFR 74 ml/min/1 73sq m   ECG 12 lead   Result Value Ref Range    Ventricular Rate 152 BPM    Atrial Rate 52 BPM    WY Interval 144 ms    QRSD Interval 12 ms    QT Interval 296 ms    QTC Interval 471 ms    P Axis  degrees    QRS Axis 29 degrees    T Wave Axis -61 degrees   ECG 12 lead   Result Value Ref Range    Ventricular Rate 53 BPM    Atrial Rate 55 BPM    WY Interval 244 ms    QRSD Interval 190 ms    QT Interval 520 ms    QTC Interval 489 ms    P Axis 52 degrees    QRS Axis -70 degrees    T Wave Axis 90 degrees   Type and screen   Result Value Ref Range    ABO Grouping O     Rh Factor Positive     Antibody Screen Negative     Specimen Expiration Date [id number]        Orders Placed This Encounter   Procedures    Ambulatory Referral to Physical Therapy       ALLERGIES:  No Known Allergies    Current Medications     Current Outpatient Medications   Medication Sig Dispense Refill    amLODIPine (NORVASC) 5 mg tablet TAKE 1 TABLET TWICE DAILY 60 tablet 10    B Complex Vitamins (VITAMIN B COMPLEX PO) Take 1 tablet by mouth daily      bisacodyl (DULCOLAX) 5 mg EC tablet Take 1 tablet (5 mg total) by mouth daily as needed for constipation 10 tablet 0    Cholecalciferol (VITAMIN D PO) Take 1 capsule by mouth daily       hydrochlorothiazide (MICROZIDE) 12 5 mg capsule TAKE 1 CAPSULE DAILY 30 capsule 0    metoprolol tartrate (LOPRESSOR) 25 mg tablet TAKE 1/2 TABLET EVERY 12 HOURS 30 tablet 11    Multiple Vitamins-Minerals (MULTIVITAMIN ADULT PO) Take 1 tablet by mouth daily      polyethylene glycol (MIRALAX) 17 g packet Take 17 g by mouth daily      potassium chloride (K-DUR,KLOR-CON) 20 mEq tablet TAKE 2 TABLETS DAILY AS DIRECTED 60 tablet 0    senna-docusate sodium (SENOKOT S) 8 6-50 mg per tablet Take 1 tablet by mouth 2 (two) times a day  0    acetaminophen (TYLENOL) 325 mg tablet Take 3 tablets (975 mg total) by mouth every 8 (eight) hours (Patient not taking: Reported on 6/3/2022)  0    enoxaparin (LOVENOX) 40 mg/0 4 mL Inject 0 4 mL (40 mg total) under the skin daily for 28 days 11 2 mL 0    gabapentin (NEURONTIN) 100 mg capsule Take 1 capsule (100 mg total) by mouth daily at bedtime (Patient not taking: No sig reported)  0    GaviLAX 17 GM/SCOOP powder TAKE 17 G (1 CAPFUL) BY MOUTH DAILY AS NEEDED (CONSTIPATION) - MIX WITH LIQUID BEFORE TAKING  (Patient not taking: Reported on 6/3/2022) 510 g 5    tamsulosin (FLOMAX) 0 4 mg TAKE 1 CAPSULE (0 4 MG TOTAL) BY MOUTH DAILY AT BEDTIME (Patient not taking: No sig reported) 30 capsule 5     No current facility-administered medications for this visit           Health Maintenance     Health Maintenance   Topic Date Due    SLP PLAN OF CARE  Never done    BMI: Followup Plan  12/09/2020    COVID-19 Vaccine (3 - Booster for Balderas Peter series) 08/04/2021    Medicare Annual Wellness Visit (AWV)  04/01/2022    Fall Risk 06/03/2023    BMI: Adult  06/03/2023    Depression Remission PHQ  06/03/2023    DTaP,Tdap,and Td Vaccines (2 - Td or Tdap) 04/16/2025    Pneumococcal Vaccine: 65+ Years  Completed    Influenza Vaccine  Completed    HIB Vaccine  Aged Out    Hepatitis B Vaccine  Aged Out    IPV Vaccine  Aged Out    Hepatitis A Vaccine  Aged Out    Meningococcal ACWY Vaccine  Aged Out    HPV Vaccine  Aged Out     Immunization History   Administered Date(s) Administered    COVID-19 PFIZER VACCINE 0 3 ML IM 02/12/2021, 03/04/2021    INFLUENZA 09/20/2014, 10/14/2016, 10/16/2017, 08/19/2018, 10/07/2019, 09/19/2021    Influenza Split High Dose Preservative Free IM 09/20/2014, 10/14/2016, 10/16/2017    Influenza, seasonal, injectable 11/04/2013    Influenza, seasonal, injectable, preservative free 10/07/2019    Pneumococcal Conjugate 13-Valent 04/16/2015    Pneumococcal Polysaccharide PPV23 10/01/2009    Td (adult), adsorbed 08/01/2010    Tdap 04/16/2015    Zoster Vaccine Recombinant 07/09/2019, 09/07/2019    influenza, trivalent, adjuvanted 47/90/3419       Vianney Ruano MD

## 2022-06-03 NOTE — PROGRESS NOTES
Assessment and Plan:     Problem List Items Addressed This Visit    None          Preventive health issues were discussed with patient, and age appropriate screening tests were ordered as noted in patient's After Visit Summary  Personalized health advice and appropriate referrals for health education or preventive services given if needed, as noted in patient's After Visit Summary       History of Present Illness:     Patient presents for Medicare Annual Wellness visit    Patient Care Team:  Lakesha Green MD as PCP - General  MD Mitul Masters MD     Problem List:     Patient Active Problem List   Diagnosis    Aortic valve disease    Cervical radiculopathy    Hypertension    LVH (left ventricular hypertrophy)    Other malignant neoplasm of unspecified site    Spinal stenosis    Obsessive compulsive disorder    Dysthymic disorder    Gait disturbance    Dry eye    SVT (supraventricular tachycardia) (HCC)    Nocturia    Fall    Closed fracture of neck of right femur with routine healing    Abnormal finding on CT scan    BPH (benign prostatic hyperplasia)    Full code status    Constipation    Diuretic-induced hypokalemia    Presence of cardiac pacemaker    Aftercare following surgery of the musculoskeletal system      Past Medical and Surgical History:     Past Medical History:   Diagnosis Date    Balanitis 1/2/2020    Basal cell carcinoma     Bradycardia     Degenerative joint disease (DJD) of lumbar spine     Hypertension     Kidney stones     Pacemaker      Past Surgical History:   Procedure Laterality Date    CARDIAC PACEMAKER PLACEMENT      CHOLECYSTECTOMY      EYE SURGERY      [place] PARTIAL HIP REPLACEMENT Right 10/10/2021    Procedure: HEMIARTHROPLASTY HIP (BIPOLAR), RIGHT;  Surgeon: Radha Norton MD;  [facility];  Service: Orthopedics    TONSILLECTOMY        Family History:     Family History   Problem Relation Age of Onset    Diabetes Mother     Heart disease Mother       Social History:     Social History     Socioeconomic History    Marital status:      Spouse name: None    Number of children: None    Years of education: None    Highest education level: None   Occupational History    Occupation: Retired   Tobacco Use    Smoking status: Never Smoker    Smokeless tobacco: Never Used   Vaping Use    Vaping Use: Never used   Substance and Sexual Activity    Alcohol use: Not Currently     Comment: occasionally; Per [employer]  No Alcohol use    Drug use: No    Sexual activity: Not Currently   Other Topics Concern    None   Social History Narrative    None     Social Determinants of Health     Financial Resource Strain: Not on file   Food Insecurity: Not on file   Transportation Needs: No Transportation Needs    Lack of Transportation (Medical): No    Lack of Transportation (Non-Medical):  No   Physical Activity: Not on file   Stress: Not on file   Social Connections: Not on file   Intimate Partner Violence: Not on file   Housing Stability: Not on file      Medications and Allergies:     Current Outpatient Medications   Medication Sig Dispense Refill    acetaminophen (TYLENOL) 325 mg tablet Take 3 tablets (975 mg total) by mouth every 8 (eight) hours  0    amLODIPine (NORVASC) 5 mg tablet TAKE 1 TABLET TWICE DAILY 60 tablet 10    B Complex Vitamins (VITAMIN B COMPLEX PO) Take 1 tablet by mouth daily      bisacodyl (DULCOLAX) 5 mg EC tablet Take 1 tablet (5 mg total) by mouth daily as needed for constipation 10 tablet 0    Cholecalciferol (VITAMIN D PO) Take 1 capsule by mouth daily       enoxaparin (LOVENOX) 40 mg/0 4 mL Inject 0 4 mL (40 mg total) under the skin daily for 28 days 11 2 mL 0    gabapentin (NEURONTIN) 100 mg capsule Take 1 capsule (100 mg total) by mouth daily at bedtime (Patient not taking: Reported on 1/27/2022 )  0    GaviLAX 17 GM/SCOOP powder TAKE 17 G (1 CAPFUL) BY MOUTH DAILY AS NEEDED (CONSTIPATION) - MIX WITH LIQUID BEFORE TAKING  510 g 5    hydrochlorothiazide (MICROZIDE) 12 5 mg capsule TAKE 1 CAPSULE DAILY 30 capsule 0    metoprolol tartrate (LOPRESSOR) 25 mg tablet TAKE 1/2 TABLET EVERY 12 HOURS 30 tablet 11    Multiple Vitamins-Minerals (MULTIVITAMIN ADULT PO) Take 1 tablet by mouth daily      polyethylene glycol (MIRALAX) 17 g packet Take 17 g by mouth daily      potassium chloride (K-DUR,KLOR-CON) 20 mEq tablet TAKE 2 TABLETS DAILY AS DIRECTED 60 tablet 0    senna-docusate sodium (SENOKOT S) 8 6-50 mg per tablet Take 1 tablet by mouth 2 (two) times a day  0    tamsulosin (FLOMAX) 0 4 mg TAKE 1 CAPSULE (0 4 MG TOTAL) BY MOUTH DAILY AT BEDTIME (Patient not taking: Reported on 1/27/2022 ) 30 capsule 5     No current facility-administered medications for this visit  No Known Allergies   Immunizations:     Immunization History   Administered Date(s) Administered    COVID-19 PFIZER VACCINE 0 3 ML IM 02/12/2021, 03/04/2021    INFLUENZA 09/20/2014, 10/14/2016, 10/16/2017, 08/19/2018, 10/07/2019, 09/19/2021    Influenza Split High Dose Preservative Free IM 09/20/2014, 10/14/2016, 10/16/2017    Influenza, seasonal, injectable 11/04/2013    Influenza, seasonal, injectable, preservative free 10/07/2019    Pneumococcal Conjugate 13-Valent 04/16/2015    Pneumococcal Polysaccharide PPV23 10/01/2009    Td (adult), adsorbed 08/01/2010    Tdap 04/16/2015    Zoster Vaccine Recombinant 07/09/2019, 09/07/2019    influenza, trivalent, adjuvanted 09/14/2019      Health Maintenance: There are no preventive care reminders to display for this patient  Topic Date Due    COVID-19 Vaccine (3 - Booster for Pfizer series) 08/04/2021      Medicare Health Risk Assessment:     There were no vitals taken for this visit  Health Risk Assessment:   Patient rates overall health as good  Patient feels that their physical health rating is slightly worse  Patient is very satisfied with their life   Eyesight was rated as slightly worse  Hearing was rated as same  Patient feels that their emotional and mental health rating is slightly worse  Patients states they are never, rarely angry  Patient states they are never, rarely unusually tired/fatigued  Patient states that he has experienced no weight loss or gain in last 6 months  Fall Risk Screening: In the past year, patient has experienced: history of falling in past year    Number of falls: 2 or more  Injured during fall?: Yes    Feels unsteady when standing or walking?: Yes    Worried about falling?: Yes      Home Safety:  Patient has trouble with stairs inside or outside of their home  Patient has working smoke alarms and has working carbon monoxide detector  Home safety hazards include: none  Nutrition:   Current diet is Regular  Medications:   Patient is currently taking over-the-counter supplements  OTC medications include: see medication list  Patient is able to manage medications  Activities of Daily Living (ADLs)/Instrumental Activities of Daily Living (IADLs):   Walk and transfer into and out of bed and chair?: Yes  Dress and groom yourself?: Yes    Bathe or shower yourself?: No    Feed yourself?  Yes  Do your laundry/housekeeping?: No  Manage your money, pay your bills and track your expenses?: Yes  Make your own meals?: Yes    Do your own shopping?: No    Previous Hospitalizations:   Any hospitalizations or ED visits within the last 12 months?: Yes    How many hospitalizations have you had in the last year?: 1-2    Advance Care Planning:   Living will: Yes    Advanced directive: Yes      PREVENTIVE SCREENINGS      Cardiovascular Screening:    General: Screening Current      Diabetes Screening:     General: Screening Current      Colorectal Cancer Screening:     General: Screening Not Indicated      Prostate Cancer Screening:    General: Screening Not Indicated      Lung Cancer Screening:     General: Screening Not Indicated    Screening, Brief Intervention, and Referral to Treatment (SBIRT)    Screening    Typical number of drinks in a week: 0    Single Item Drug Screening:  How often have you used an illegal drug (including marijuana) or a prescription medication for non-medical reasons in the past year? never    Single Item Drug Screen Score: 0  Interpretation: Negative screen for possible drug use disorder      Paulette Cintron MD

## 2022-06-03 NOTE — ASSESSMENT & PLAN NOTE
Ambulatory dysfunction  Patient with lower extremity weakness  He was given a referral to initiate physical therapy as an outpatient

## 2022-06-07 DIAGNOSIS — I35.9 AORTIC VALVE DISEASE: ICD-10-CM

## 2022-06-07 DIAGNOSIS — I10 ESSENTIAL (PRIMARY) HYPERTENSION: ICD-10-CM

## 2022-06-08 RX ORDER — POTASSIUM CHLORIDE 20 MEQ/1
TABLET, EXTENDED RELEASE ORAL
Qty: 60 TABLET | Refills: 0 | Status: SHIPPED | OUTPATIENT
Start: 2022-06-08 | End: 2022-07-08

## 2022-06-08 RX ORDER — AMLODIPINE BESYLATE 5 MG/1
TABLET ORAL
Qty: 60 TABLET | Refills: 10 | Status: SHIPPED | OUTPATIENT
Start: 2022-06-08

## 2022-06-08 RX ORDER — HYDROCHLOROTHIAZIDE 12.5 MG/1
CAPSULE, GELATIN COATED ORAL
Qty: 30 CAPSULE | Refills: 0 | Status: SHIPPED | OUTPATIENT
Start: 2022-06-08 | End: 2022-07-08

## 2022-06-10 ENCOUNTER — IN-CLINIC DEVICE VISIT (OUTPATIENT)
Dept: CARDIOLOGY CLINIC | Facility: CLINIC | Age: 87
End: 2022-06-10
Payer: COMMERCIAL

## 2022-06-10 DIAGNOSIS — Z95.0 CARDIAC PACEMAKER IN SITU: Primary | ICD-10-CM

## 2022-06-10 PROCEDURE — 93280 PM DEVICE PROGR EVAL DUAL: CPT | Performed by: INTERNAL MEDICINE

## 2022-06-10 NOTE — PROGRESS NOTES
Results for orders placed or performed in visit on 06/10/22   Cardiac EP device report    Narrative    BSC-DUAL CHAMBER PPM (DDD MODE)/ NOT MRI CONDITIONAL  DEVICE INTERROGATED IN THE [place] OFFICE: BATTERY VOLTAGE ADEQUATE-3 YRS  AP 24%  61%  ALL AVAILABLE LEAD PARAMETERS WITHIN NORMAL LIMITS  NO SIGNIFICANT HIGH RATE EPISODES  NO PROGRAMMING CHANGES MADE TO DEVICE PARAMETERS  NORMAL DEVICE FUNCTION   NC

## 2022-06-24 ENCOUNTER — OFFICE VISIT (OUTPATIENT)
Dept: FAMILY MEDICINE CLINIC | Facility: CLINIC | Age: 87
End: 2022-06-24
Payer: COMMERCIAL

## 2022-06-24 VITALS
BODY MASS INDEX: 27.3 KG/M2 | RESPIRATION RATE: 18 BRPM | HEIGHT: 73 IN | DIASTOLIC BLOOD PRESSURE: 68 MMHG | HEART RATE: 62 BPM | WEIGHT: 206 LBS | SYSTOLIC BLOOD PRESSURE: 140 MMHG | OXYGEN SATURATION: 98 % | TEMPERATURE: 98.4 F

## 2022-06-24 DIAGNOSIS — I47.1 SVT (SUPRAVENTRICULAR TACHYCARDIA) (HCC): Primary | ICD-10-CM

## 2022-06-24 DIAGNOSIS — R07.9 CHEST PAIN, UNSPECIFIED TYPE: ICD-10-CM

## 2022-06-24 PROCEDURE — 1036F TOBACCO NON-USER: CPT | Performed by: FAMILY MEDICINE

## 2022-06-24 PROCEDURE — 99214 OFFICE O/P EST MOD 30 MIN: CPT | Performed by: FAMILY MEDICINE

## 2022-06-24 PROCEDURE — 1160F RVW MEDS BY RX/DR IN RCRD: CPT | Performed by: FAMILY MEDICINE

## 2022-06-24 RX ORDER — NITROGLYCERIN 0.4 MG/1
0.4 TABLET SUBLINGUAL
Qty: 25 TABLET | Refills: 2 | Status: SHIPPED | OUTPATIENT
Start: 2022-06-24

## 2022-06-24 NOTE — PROGRESS NOTES
FAMILY PRACTICE OFFICE VISIT       NAME: Christa Humphrey  AGE: 80 y o  SEX: male       : 1935        MRN: [id number]    DATE: 2022  TIME: 1:34 PM    Assessment and Plan     Problem List Items Addressed This Visit        Cardiovascular and Mediastinum    SVT (supraventricular tachycardia) ([facility] [address] ) - Primary    Relevant Medications    nitroglycerin (NITROSTAT) 0 4 mg SL tablet       Other    Chest pain     Chest pain  With custom possibility of esophageal reflux versus CAD  Patient was given prescription for sublingual nitroglycerin 0 4 mg to use Q 5 minutes p r n should symptoms recur at night  If he uses 2 tablets within 10 minutes without relief he is to call 911  If symptoms resolve after use of medication he will call his cardiology office for any further workup required  He will also make an effort to not have food later in the evenings an to sleep with his head propped up that 45 degree angle to prevent any possible reflux from esophagus                     Chief Complaint     Chief Complaint   Patient presents with    on and off chest discomfort       History of Present Illness     Patient in the office with history of 2 episodes of chest discomfort which occurred over the past 2 weeks  The last episode was 1 week ago where he awoke at night while sleeping with upper abdominal and chest discomfort  Patient called 911 and had EMT come to the house  By the time they arrived symptoms had resolved spontaneously  Patient had his heart monitored while they were there as well as his blood pressure reading which were both stable  Patient declined to be evaluated in the emergency room  He believes some of the symptoms are related to esophageal reflux due to eating later in the evening or not having his head propped up during the night  Review of Systems   Review of Systems   Constitutional: Negative  Respiratory: Negative  Cardiovascular: Positive for chest pain   Negative for palpitations and leg swelling  Gastrointestinal:        As per HPI       Active Problem List     Patient Active Problem List   Diagnosis    Aortic valve disease    Cervical radiculopathy    Hypertension    LVH (left ventricular hypertrophy)    Other malignant neoplasm of unspecified site    Spinal stenosis    Obsessive compulsive disorder    Dysthymic disorder    Gait disturbance    Dry eye    SVT (supraventricular tachycardia) (HCC)    Nocturia    Fall    Closed fracture of neck of right femur with routine healing    Abnormal finding on CT scan    BPH (benign prostatic hyperplasia)    Full code status    Constipation    Diuretic-induced hypokalemia    Presence of cardiac pacemaker    Aftercare following surgery of the musculoskeletal system    Ambulatory dysfunction    Chest pain       Past Medical History:  Past Medical History:   Diagnosis Date    Balanitis 1/2/2020    Basal cell carcinoma     Bradycardia     Degenerative joint disease (DJD) of lumbar spine     Hypertension     Kidney stones     Pacemaker        Past Surgical History:  Past Surgical History:   Procedure Laterality Date    CARDIAC PACEMAKER PLACEMENT      CHOLECYSTECTOMY      EYE SURGERY      TX PARTIAL HIP REPLACEMENT Right 10/10/2021    Procedure: HEMIARTHROPLASTY HIP (BIPOLAR), RIGHT;  Surgeon: Alexis Virk MD;  Location: [facility];  Service: Orthopedics    TONSILLECTOMY         Family History:  Family History   Problem Relation Age of Onset    Diabetes Mother     Heart disease Mother        Social History:  Social History     Socioeconomic History    Marital status:       Spouse name: Not on file    Number of children: Not on file    Years of education: Not on file    Highest education level: Not on file   Occupational History    Occupation: Retired   Tobacco Use    Smoking status: Never Smoker    Smokeless tobacco: Never Used   Vaping Use    Vaping Use: Never used   Substance and Sexual Activity    Alcohol use: Not Currently     Comment: occasionally; Per [employer]  No Alcohol use    Drug use: No    Sexual activity: Not Currently   Other Topics Concern    Not on file   Social History Narrative    Not on file     Social Determinants of Health     Financial Resource Strain: Not on file   Food Insecurity: Not on file   Transportation Needs: No Transportation Needs    Lack of Transportation (Medical): No    Lack of Transportation (Non-Medical): No   Physical Activity: Not on file   Stress: Not on file   Social Connections: Not on file   Intimate Partner Violence: Not on file   Housing Stability: Not on file       Objective     Vitals:    06/24/22 1300   BP: 140/68   Pulse: 62   Resp: 18   Temp: 98 4 °F (36 9 °C)   SpO2: 98%     Wt Readings from Last 3 Encounters:   06/24/22 93 4 kg (206 lb)   06/03/22 92 6 kg (204 lb 4 oz)   01/27/22 93 kg (205 lb)       Physical Exam  Constitutional:       Appearance: Normal appearance  HENT:      Head: Normocephalic and atraumatic  Cardiovascular:      Rate and Rhythm: Normal rate and regular rhythm  Heart sounds: Normal heart sounds  No murmur heard  Pulmonary:      Effort: Pulmonary effort is normal  No respiratory distress  Breath sounds: Normal breath sounds  No wheezing, rhonchi or rales  Musculoskeletal:      Right lower leg: Edema present  Left lower leg: Edema present  Comments: Bilateral trace peripheral edema lower extremities but no significant changes from baseline   Neurological:      General: No focal deficit present  Mental Status: He is alert and oriented to person, place, and time  Mental status is at baseline  Cranial Nerves: No cranial nerve deficit  Psychiatric:         Mood and Affect: Mood normal          Behavior: Behavior normal          Thought Content:  Thought content normal          Judgment: Judgment normal          Pertinent Laboratory/Diagnostic Studies:  Lab Results   Component Value Date GLUCOSE 114 04/08/2015    BUN 29 (H) 10/12/2021    CREATININE 0 93 10/12/2021    CALCIUM 8 1 (L) 10/12/2021     04/08/2015    K 4 4 10/12/2021    CO2 24 10/12/2021     10/12/2021     Lab Results   Component Value Date    ALT 31 04/05/2021    AST 22 04/05/2021    ALKPHOS 72 04/05/2021    BILITOT 0 78 04/07/2015       Lab Results   Component Value Date    WBC 14 31 (H) 10/11/2021    HGB 11 6 (L) 10/11/2021    HCT 34 3 (L) 10/11/2021     (H) 10/11/2021     10/11/2021       No results found for: TSH    Lab Results   Component Value Date    CHOL 132 03/24/2014     Lab Results   Component Value Date    TRIG 48 04/05/2021     Lab Results   Component Value Date    HDL 64 04/05/2021     Lab Results   Component Value Date    LDLCALC 61 04/05/2021     No results found for: HGBA1C    Results for orders placed or performed during the hospital encounter of 10/10/21   COVID19, Influenza A/B, RSV PCR, [facility]    Specimen: Nose; Nares   Result Value Ref Range    SARS-CoV-2 Negative Negative    INFLUENZA A PCR Negative Negative    INFLUENZA B PCR Negative Negative    RSV PCR Negative Negative   Troponin I   Result Value Ref Range    Troponin I <0 02 <=0 04 ng/mL   CBC and differential   Result Value Ref Range    WBC 7 86 4 31 - 10 16 Thousand/uL    RBC 3 67 (L) 3 88 - 5 62 Million/uL    Hemoglobin 12 5 12 0 - 17 0 g/dL    Hematocrit 37 9 36 5 - 49 3 %     (H) 82 - 98 fL    MCH 34 1 26 8 - 34 3 pg    MCHC 33 0 31 4 - 37 4 g/dL    RDW 14 0 11 6 - 15 1 %    MPV 11 0 8 9 - 12 7 fL    Platelets 024 533 - 841 Thousands/uL    nRBC 0 /100 WBCs    Neutrophils Relative 65 43 - 75 %    Immat GRANS % 1 0 - 2 %    Lymphocytes Relative 20 14 - 44 %    Monocytes Relative 9 4 - 12 %    Eosinophils Relative 4 0 - 6 %    Basophils Relative 1 0 - 1 %    Neutrophils Absolute 5 18 1 85 - 7 62 Thousands/µL    Immature Grans Absolute 0 10 0 00 - 0 20 Thousand/uL    Lymphocytes Absolute 1 57 0 60 - 4 47 Thousands/µL Monocytes Absolute 0 67 0 17 - 1 22 Thousand/µL    Eosinophils Absolute 0 30 0 00 - 0 61 Thousand/µL    Basophils Absolute 0 04 0 00 - 0 10 Thousands/µL   Basic metabolic panel   Result Value Ref Range    Sodium 140 136 - 145 mmol/L    Potassium 4 1 3 5 - 5 3 mmol/L    Chloride 106 100 - 108 mmol/L    CO2 25 21 - 32 mmol/L    ANION GAP 9 4 - 13 mmol/L    BUN 22 5 - 25 mg/dL    Creatinine 0 76 0 60 - 1 30 mg/dL    Glucose 130 65 - 140 mg/dL    Calcium 9 0 8 3 - 10 1 mg/dL    eGFR 83 ml/min/1 73sq m   Protime-INR   Result Value Ref Range    Protime 13 7 11 6 - 14 5 seconds    INR 1 05 0 84 - 1 19   APTT   Result Value Ref Range    PTT 32 23 - 37 seconds   Basic metabolic panel   Result Value Ref Range    Sodium 140 136 - 145 mmol/L    Potassium 4 5 3 5 - 5 3 mmol/L    Chloride 106 100 - 108 mmol/L    CO2 25 21 - 32 mmol/L    ANION GAP 9 4 - 13 mmol/L    BUN 23 5 - 25 mg/dL    Creatinine 0 79 0 60 - 1 30 mg/dL    Glucose 148 (H) 65 - 140 mg/dL    Calcium 7 9 (L) 8 3 - 10 1 mg/dL    eGFR 81 ml/min/1 73sq m   CBC (With Platelets)   Result Value Ref Range    WBC 12 69 (H) 4 31 - 10 16 Thousand/uL    RBC 3 40 (L) 3 88 - 5 62 Million/uL    Hemoglobin 11 4 (L) 12 0 - 17 0 g/dL    Hematocrit 34 0 (L) 36 5 - 49 3 %     (H) 82 - 98 fL    MCH 33 5 26 8 - 34 3 pg    MCHC 33 5 31 4 - 37 4 g/dL    RDW 14 0 11 6 - 15 1 %    Platelets 317 548 - 242 Thousands/uL    MPV 10 5 8 9 - 12 7 fL   CBC and differential   Result Value Ref Range    WBC 14 31 (H) 4 31 - 10 16 Thousand/uL    RBC 3 39 (L) 3 88 - 5 62 Million/uL    Hemoglobin 11 6 (L) 12 0 - 17 0 g/dL    Hematocrit 34 3 (L) 36 5 - 49 3 %     (H) 82 - 98 fL    MCH 34 2 26 8 - 34 3 pg    MCHC 33 8 31 4 - 37 4 g/dL    RDW 13 8 11 6 - 15 1 %    MPV 10 2 8 9 - 12 7 fL    Platelets 246 201 - 277 Thousands/uL    nRBC 0 /100 WBCs    Neutrophils Relative 88 (H) 43 - 75 %    Immat GRANS % 0 0 - 2 %    Lymphocytes Relative 6 (L) 14 - 44 %    Monocytes Relative 6 4 - 12 % Eosinophils Relative 0 0 - 6 %    Basophils Relative 0 0 - 1 %    Neutrophils Absolute 12 45 (H) 1 85 - 7 62 Thousands/µL    Immature Grans Absolute 0 05 0 00 - 0 20 Thousand/uL    Lymphocytes Absolute 0 89 0 60 - 4 47 Thousands/µL    Monocytes Absolute 0 91 0 17 - 1 22 Thousand/µL    Eosinophils Absolute 0 00 0 00 - 0 61 Thousand/µL    Basophils Absolute 0 01 0 00 - 0 10 Thousands/µL   Basic metabolic panel   Result Value Ref Range    Sodium 137 136 - 145 mmol/L    Potassium 4 4 3 5 - 5 3 mmol/L    Chloride 105 100 - 108 mmol/L    CO2 24 21 - 32 mmol/L    ANION GAP 8 4 - 13 mmol/L    BUN 29 (H) 5 - 25 mg/dL    Creatinine 0 93 0 60 - 1 30 mg/dL    Glucose 135 65 - 140 mg/dL    Calcium 8 1 (L) 8 3 - 10 1 mg/dL    eGFR 74 ml/min/1 73sq m   ECG 12 lead   Result Value Ref Range    Ventricular Rate 152 BPM    Atrial Rate 52 BPM    MT Interval 144 ms    QRSD Interval 12 ms    QT Interval 296 ms    QTC Interval 471 ms    P Axis  degrees    QRS Axis 29 degrees    T Wave Axis -61 degrees   ECG 12 lead   Result Value Ref Range    Ventricular Rate 53 BPM    Atrial Rate 55 BPM    MT Interval 244 ms    QRSD Interval 190 ms    QT Interval 520 ms    QTC Interval 489 ms    P Axis 52 degrees    QRS Axis -70 degrees    T Wave Axis 90 degrees   Type and screen   Result Value Ref Range    ABO Grouping O     Rh Factor Positive     Antibody Screen Negative     Specimen Expiration Date [id number]        No orders of the defined types were placed in this encounter        ALLERGIES:  No Known Allergies    Current Medications     Current Outpatient Medications   Medication Sig Dispense Refill    amLODIPine (NORVASC) 5 mg tablet TAKE 1 TABLET TWICE DAILY 60 tablet 10    B Complex Vitamins (VITAMIN B COMPLEX PO) Take 1 tablet by mouth daily      bisacodyl (DULCOLAX) 5 mg EC tablet Take 1 tablet (5 mg total) by mouth daily as needed for constipation 10 tablet 0    Cholecalciferol (VITAMIN D PO) Take 1 capsule by mouth daily       hydrochlorothiazide (MICROZIDE) 12 5 mg capsule TAKE 1 CAPSULE DAILY 30 capsule 0    metoprolol tartrate (LOPRESSOR) 25 mg tablet TAKE 1/2 TABLET EVERY 12 HOURS 30 tablet 11    Multiple Vitamins-Minerals (MULTIVITAMIN ADULT PO) Take 1 tablet by mouth daily      nitroglycerin (NITROSTAT) 0 4 mg SL tablet Place 1 tablet (0 4 mg total) under the tongue every 5 (five) minutes as needed for chest pain 25 tablet 2    polyethylene glycol (MIRALAX) 17 g packet Take 17 g by mouth daily      potassium chloride (K-DUR,KLOR-CON) 20 mEq tablet TAKE 2 TABLETS DAILY AS DIRECTED 60 tablet 0    senna-docusate sodium (SENOKOT S) 8 6-50 mg per tablet Take 1 tablet by mouth 2 (two) times a day  0    acetaminophen (TYLENOL) 325 mg tablet Take 3 tablets (975 mg total) by mouth every 8 (eight) hours (Patient not taking: No sig reported)  0    enoxaparin (LOVENOX) 40 mg/0 4 mL Inject 0 4 mL (40 mg total) under the skin daily for 28 days 11 2 mL 0    gabapentin (NEURONTIN) 100 mg capsule Take 1 capsule (100 mg total) by mouth daily at bedtime (Patient not taking: No sig reported)  0    GaviLAX 17 GM/SCOOP powder TAKE 17 G (1 CAPFUL) BY MOUTH DAILY AS NEEDED (CONSTIPATION) - MIX WITH LIQUID BEFORE TAKING  (Patient not taking: No sig reported) 510 g 5    tamsulosin (FLOMAX) 0 4 mg TAKE 1 CAPSULE (0 4 MG TOTAL) BY MOUTH DAILY AT BEDTIME (Patient not taking: Reported on 6/24/2022) 30 capsule 5     No current facility-administered medications for this visit           Health Maintenance     Health Maintenance   Topic Date Due    SLP PLAN OF CARE  Never done    Falls: Plan of Care  Never done    BMI: Followup Plan  12/09/2020    COVID-19 Vaccine (3 - Booster for Balderas Peter series) 08/04/2021    Fall Risk  06/03/2023    Medicare Annual Wellness Visit (AWV)  06/03/2023    Depression Remission PHQ  06/03/2023    BMI: Adult  06/24/2023    DTaP,Tdap,and Td Vaccines (2 - Td or Tdap) 04/16/2025    Pneumococcal Vaccine: 65+ Years Completed    Influenza Vaccine  Completed    HIB Vaccine  Aged Out    Hepatitis B Vaccine  Aged Out    IPV Vaccine  Aged Out    Hepatitis A Vaccine  Aged Out    Meningococcal ACWY Vaccine  Aged Out    HPV Vaccine  Aged Out     Immunization History   Administered Date(s) Administered    COVID-19 PFIZER VACCINE 0 3 ML IM 02/12/2021, 03/04/2021    INFLUENZA 09/20/2014, 10/14/2016, 10/16/2017, 08/19/2018, 10/07/2019, 09/19/2021    Influenza Split High Dose Preservative Free IM 09/20/2014, 10/14/2016, 10/16/2017    Influenza, seasonal, injectable 11/04/2013    Influenza, seasonal, injectable, preservative free 10/07/2019    Pneumococcal Conjugate 13-Valent 04/16/2015    Pneumococcal Polysaccharide PPV23 10/01/2009    Td (adult), adsorbed 08/01/2010    Tdap 04/16/2015    Zoster Vaccine Recombinant 07/09/2019, 09/07/2019    influenza, trivalent, adjuvanted 79/03/9326       Basilio Bernstein MD    I spent 25 minutes with this patient which greater than 50% was spent counseling or reviewing chart

## 2022-06-24 NOTE — ASSESSMENT & PLAN NOTE
Chest pain  With custom possibility of esophageal reflux versus CAD  Patient was given prescription for sublingual nitroglycerin 0 4 mg to use Q 5 minutes p r n should symptoms recur at night  If he uses 2 tablets within 10 minutes without relief he is to call 911  If symptoms resolve after use of medication he will call his cardiology office for any further workup required    He will also make an effort to not have food later in the evenings an to sleep with his head propped up that 45 degree angle to prevent any possible reflux from esophagus

## 2022-07-07 DIAGNOSIS — I10 ESSENTIAL (PRIMARY) HYPERTENSION: ICD-10-CM

## 2022-07-07 DIAGNOSIS — K59.00 CONSTIPATION: ICD-10-CM

## 2022-07-07 RX ORDER — POLYETHYLENE GLYCOL 3350 17 G/17G
POWDER, FOR SOLUTION ORAL
Qty: 510 G | Refills: 5 | Status: SHIPPED | OUTPATIENT
Start: 2022-07-07

## 2022-07-08 RX ORDER — POTASSIUM CHLORIDE 20 MEQ/1
TABLET, EXTENDED RELEASE ORAL
Qty: 60 TABLET | Refills: 0 | Status: SHIPPED | OUTPATIENT
Start: 2022-07-08 | End: 2022-08-05 | Stop reason: SDUPTHER

## 2022-07-08 RX ORDER — HYDROCHLOROTHIAZIDE 12.5 MG/1
CAPSULE, GELATIN COATED ORAL
Qty: 30 CAPSULE | Refills: 0 | Status: SHIPPED | OUTPATIENT
Start: 2022-07-08 | End: 2022-08-05 | Stop reason: SDUPTHER

## 2022-08-04 ENCOUNTER — TELEPHONE (OUTPATIENT)
Dept: CARDIOLOGY CLINIC | Facility: CLINIC | Age: 87
End: 2022-08-04

## 2022-08-05 ENCOUNTER — TELEPHONE (OUTPATIENT)
Dept: CARDIOLOGY CLINIC | Facility: CLINIC | Age: 87
End: 2022-08-05

## 2022-08-05 DIAGNOSIS — I10 ESSENTIAL (PRIMARY) HYPERTENSION: ICD-10-CM

## 2022-08-05 RX ORDER — HYDROCHLOROTHIAZIDE 12.5 MG/1
12.5 CAPSULE, GELATIN COATED ORAL DAILY
Qty: 30 CAPSULE | Refills: 0 | Status: SHIPPED | OUTPATIENT
Start: 2022-08-05 | End: 2022-09-14

## 2022-08-05 RX ORDER — POTASSIUM CHLORIDE 20 MEQ/1
40 TABLET, EXTENDED RELEASE ORAL DAILY
Qty: 60 TABLET | Refills: 0 | Status: SHIPPED | OUTPATIENT
Start: 2022-08-05 | End: 2022-09-14

## 2022-08-08 ENCOUNTER — TELEPHONE (OUTPATIENT)
Dept: CARDIOLOGY CLINIC | Facility: CLINIC | Age: 87
End: 2022-08-08

## 2022-09-02 ENCOUNTER — OFFICE VISIT (OUTPATIENT)
Dept: DERMATOLOGY | Facility: CLINIC | Age: 87
End: 2022-09-02
Payer: COMMERCIAL

## 2022-09-02 VITALS — TEMPERATURE: 98.7 F | BODY MASS INDEX: 27.3 KG/M2 | WEIGHT: 206 LBS | HEIGHT: 73 IN

## 2022-09-02 DIAGNOSIS — L57.0 KERATOSIS, ACTINIC: ICD-10-CM

## 2022-09-02 DIAGNOSIS — L30.9 DERMATITIS: ICD-10-CM

## 2022-09-02 DIAGNOSIS — D48.5 NEOPLASM OF UNCERTAIN BEHAVIOR OF SKIN: Primary | ICD-10-CM

## 2022-09-02 PROCEDURE — 1160F RVW MEDS BY RX/DR IN RCRD: CPT | Performed by: DERMATOLOGY

## 2022-09-02 PROCEDURE — 99204 OFFICE O/P NEW MOD 45 MIN: CPT | Performed by: DERMATOLOGY

## 2022-09-02 NOTE — PATIENT INSTRUCTIONS
Assessment and Plan:  Based on a thorough discussion of this condition and the management approach to it (including a comprehensive discussion of the known risks, side effects and potential benefits of treatment), the patient (family) agrees to implement the following specific plan:  Use moisturizer like Eucerin,Cerave, Vanicream or Aveeno Cream 3 times a day for the dry skin          Start using Dove moisturizer bar soap in the shower       Assessment and Plan:   Atopic Dermatitis is a chronic, itchy skin condition that is very common in children but may occur at any age  It is also known as eczema or atopic eczema   It is the most common form of dermatitis  Atopic dermatitis usually occurs in people who have an atopic tendency    This means they may develop any or all of these closely linked conditions: Atopic dermatitis, asthma, hay fever (allergic rhinitis), eosinophilic esophagitis, and gastroenteritis  Often these conditions run within families with a parent, child or sibling also affected  A family history of asthma, eczema or hay fever is particularly useful in diagnosing atopic dermatitis in infants  Atopic dermatitis arises because of a complex interaction of genetic and environmental factors  These include defects in skin barrier function making the skin more susceptible to irritation by soap and other contact irritants, the weather, temperature and non-specific triggers  There is also an element of immune system dysregulation that is often present  By definition, it is chronic and has a "waxing-waning" nature; flares should be expected but with good education and treatment strategies can be minimized      Your treatment plan  Using only mild cleansers (hypoallergenic and without fragrances) and fragrance free detergent (not unscented products which contain a masking agent)  Apply moisturizer to entire body at least 2 times a day  Use the above listed medication to affected areas twice a day for a full 2 days after the rash has cleared  Take on over the counter antihistamine like diphenhydramine before bed if the itching is keeping you awake              ATOPIC DERMATITIS FAQS    WHAT IS ATOPIC DERMATITIS? Atopic dermatitis (also called eczema) results from a weak skin barrier and an over active immune system  The weak skin barrier allows things to get into the skin and then the immune system has an intense reaction to this substances  The result is itchy red patches anywhere on the body  WHO GETS ATOPIC DERMATITIS? There is no single answer because many factors are involved  It is likely a combination of genetic makeup and environmental triggers and/or exposures  People with atopic dermatitis are prone to other types of "atopy"  They are at increased risk for food allergies, Asthma and hay fever and there is often a family history of these problems as well  WHAT ABOUT FOOD ALLERGIES? This is a very controversial topic, as many believe that food allergies are responsible for skin flares  In some cases, specific foods may cause worsening of atopic dermatitis; however this occurs in a minority of cases and usually happens within a few hours of ingestion  While food allergy is more common in children with eczema, foods are specific triggers for flares in only a small percentage of children  If you notice that the skin flares after certain foods you can see if eliminating one food at time makes a difference, as long as your child can still enjoy a well-balanced diet  There are blood (RAST) and skin (PRICK) tests that can check for allergies, but they are often positive in children who are not truly allergic  Therefore it is important that you work with your allergist and dermatologist to determine which foods are relevant and causing true symptoms    Extreme food elimination diets without the guidance of your doctor, which have become more popular in recent years, may even result in worsening of the skin rash due to malnutrition and avoidance of essential nutrients  WHY SO MUCH MOISTURIZER? This is the 1st step and most important part of treatment  This helps maintaint the skin's barrier function and prevent flares  Creams and ointments are preferable over lotions  Aveeno eczema relief and Eucerin eczema relief  and cerave are all good ones to start with  It is best to put  moisturizer on directly after bathing, while the skin is damp, it is twice as effective then  You may bathe daily  Use warm - not hot - water, for short periods of time (5-10 minutes at a time) Dry off by Lightly patting the skin with a towel and not rubbing  While the skin is still damp, (within 3 minutes) apply any medications to the rashy areas 1st and then moisturize the entire body  It's best to apply the medication 1st but if the medications sting, moisturizer can be applied 1st     WHY USE THE MEDICATION FOR AN EXTRA 2 DAYS AFTER THE RASH IS GONE? Sometimes the rash may appear to be gone, but there are still microscopic changes in the skin  Using the medication and extra 2 days will ensure the inflammation has resolve and make the rash less likely to return quickly  WHAT ARE TRIGGERS? Occasionally there are specific things that trigger itching and rashes, but in many cases may none can be identified  The most common triggers are:  Heat and sweat for some individuals, cold weather for others  House dust mites, pet fur  Wool; synthetic fabrics like nylon; dyed fabrics  Tobacco smoke   Fragrances in: shampoos, soaps, lotions, laundry detergents, fabric softeners  Saliva or prolonged exposure to water  start with these  Avoid the use of fabric softeners in the washing machine or dryer sheets (unless they are fragrance-free)  Try to use laundry detergents, soaps and shampoos that are fragrance-free  You may find it helpful to double-rinse your clothes    Some children are sensitive to house dust mites and they may benefit from a plastic mattress wrap  While food allergy is more common in children with eczema, foods are specific triggers for flares in only a small percentage of children  If you notice that the skin flares after certain foods you can see if eliminating one food at time makes a difference, as long as your child can still enjoy a well-balanced diet  4) WHAT DOES THE ANTIHISTAMINE DO? Antihistamines help you not to scratch  Scratching only makes the skin more reactive and the barrier function even more disrupted  It can cause both children and their parents to lose sleep! There are different types of anti-itch medications  Some cause more drowsiness than others  Both types are acceptable depending on your child and your preference  Start with Benadryl and if that does not work, ask for a prescription antihistamine      5) ARE THERE ANY SIDE EFFECTS TO WORRY ABOUT? Corticosteroid creams and ointments (generally things with "-one" or "josiah" on the end of their names): The strength of the cream or ointment depends on the name of the active ingredient  The numbers at the end do not indicate the relative strength  Thus triamcinolone 0 1% ointment, considered a mid-strength corticosteroid, is much stronger than hydrocortisone 1% even though the number following the name is much lower  Topical corticosteroids are very effective in treating atopic dermatitis  When used in the manner prescribed (to rashy areas of skin and for no more than a few weeks at a time to any one area) they are very safe  These are corticosteroids and are anti-inflammatory, not the anabolic steroids like those used illegally by some athletes  It is important that you do not overuse steroid creams, and if you notice a thin, shiny appearance to the skin or broken blood vessels, you should stop using the cream and consult your health care provider regarding possible overuse/overthinning of the skin    The face, armpits and groin have particularly thin and sensitive skin and are therefore most at risk for bad results if steroids are over-used in these sites  Topical non-steroid creams and ointments (immunomodulators): These creams and ointments are also called topical calcineurin inhibitors (TCIs)  These include Protopic ointment and Elidel cream  Crisaborole 2% Juanita Viviane) is a prescription ointment that targets an enzyme called PDE4 (phosphodiesterase 4)  It is used on the skin topically to treat mild-to-moderate eczema in adults and children 3years of age and older  In total, these nonsteroidal prescriptions are used to help decrease itching and redness in the skin  They are not as strong as most steroid creams; however, it is believed that they do not thin the skin when overused  They are generally used as second-line medications, though they may be used alone or in conjunction with topical steroids  In sensitive areas such as the face, underarms or groin, they are often recommended  They can sting inflamed skin, but are generally well tolerated once the skin is healing  The FDA placed a black-box warning on both Elidel and Protopic in 2006 based on animal studies using the medications  Some animals developed skin cancer and lymphoma  Subsequently, the FDA released a statement that there is no causal relationship between the two medications and cancer  Because of this concern, there are ongoing studies to evaluate this relationship in humans  So far, there are studies that support the safety of these medications  One showed that the rates of cancer in patient using these medications topically were less than the rates of the general population and another showed that in patient's using the medication over a large area of the body, the levels of the medication in the blood was undetectable      As for Eucrisa, this product is only approved for the topical treatment of mild-to-moderate eczema in patients 3years of age and older; use of the medication in kids younger than 2 is considered off label and has not been formally studied  Burning and stinging are the most commonly reported side effects of this medication  Rarely, this product has been known to cause hives and hypersensitivity reactions; discontinue its use if you develop severe itching, swelling, or redness in the area of application  2  ACTINIC KERATOSIS    Assessment and Plan:  Based on a thorough discussion of this condition and the management approach to it (including a comprehensive discussion of the known risks, side effects and potential benefits of treatment), the patient (family) agrees to implement the following specific plan: We will start imiquimod cream after the biopsy of the nodules   Discussed side effects of the cream   Expect irritation   Monitor for any changes     Actinic keratoses are very common on sites repeatedly exposed to the sun, especially the backs of the hands and the face  They are considered precancers and have a low risk of turning into squamous cell carcinoma  It is rare for a solitary actinic keratosis to evolve into a squamous cell carcinoma (SCC), but the risk is 10-15% when more than 10 actinic keratoses are present  A tender, thickened, ulcerated or enlarging actinic keratosis is suspicious of SCC  Actinic keratoses may be prevented by strict sun protection  If already present, keratoses may improve with a very high sun protection factor (50+) broad-spectrum sunscreen applied at least daily to affected areas, year-round  We recommend that sun protective clothing and hats and sunglasses be worn whenever possible    Note that you can make you own UPF 30 rate clothing using just your own washing machine with a product called sun guard    There are several different options for treating actinic keratoses    Topical medications such as 5-fluorouracil or Aldara  - good for field treatment ie treats what's seen and not seen    Cryotherapy - good for single spots but treats only what we see versus a field treatment    Photodynamic therapy - involves application of a light sensitizing medicine and then exposure to a special light, also a good field treatment        Today we opted to treat your actinic keratoses with Imiquimod in the future

## 2022-09-02 NOTE — PROGRESS NOTES
[facility] Note     Patient Name: Svetlana Dolan  Encounter Date: 09/01/22     Have you been cared for by a [facility] Dermatologist in the last 3 years and, if so, which one? No    · Have you traveled outside of the [address] [place] [address] in the past 3 months or outside of the [facility] area in the last 2 weeks? No     May we call your Preferred Phone number to discuss your specific medical information? Yes     May we leave a detailed message that includes your specific medical information? Yes      Today's Chief Concerns:   Concern #1:  Spot on scalp    Concern #2:      Past Medical History:  Have you personally ever had or currently have any of the following? · Skin cancer (such as Melanoma, Basal Cell Carcinoma, Squamous Cell Carcinoma? (If Yes, please provide more detail)- YES, BCC over 10 years   · Eczema: YES  · Psoriasis: No  · HIV/AIDS: No  · Hepatitis B or C: No  · Tuberculosis: No  · Systemic Immunosuppression such as Diabetes, Biologic or Immunotherapy, Chemotherapy, Organ Transplantation, Bone Marrow Transplantation (If YES, please provide more detail): No  · Radiation Treatment (If YES, please provide more detail): No  · Any other major medical conditions/concerns? (If Yes, which types)- No    Social History:     What is/was your primary occupation? Retired      What are your hobbies/past-times? Play on the computer, Music     Family History:  Have any of your "first degree relatives" (parent, brother, sister, or child) had any of the following       · Skin cancer such as Melanoma or Merkel Cell Carcinoma or Pancreatic Cancer? No  · Eczema, Asthma, Hay Fever or Seasonal Allergies: No  · Psoriasis or Psoriatic Arthritis: No  · Do any other medical conditions seem to run in your family? If Yes, what condition and which relatives?   No    Current Medications:       Current Outpatient Medications:     amLODIPine (NORVASC) 5 mg tablet, TAKE 1 TABLET TWICE DAILY, Disp: 60 tablet, Rfl: 10    B Complex Vitamins (VITAMIN B COMPLEX PO), Take 1 tablet by mouth daily, Disp: , Rfl:     bisacodyl (DULCOLAX) 5 mg EC tablet, Take 1 tablet (5 mg total) by mouth daily as needed for constipation, Disp: 10 tablet, Rfl: 0    Cholecalciferol (VITAMIN D PO), Take 1 capsule by mouth daily , Disp: , Rfl:     GaviLAX 17 GM/SCOOP powder, TAKE 17 G (1 CAPFUL) BY MOUTH DAILY AS NEEDED (CONSTIPATION) - MIX WITH LIQUID BEFORE TAKING , Disp: 510 g, Rfl: 5    hydrochlorothiazide (MICROZIDE) 12 5 mg capsule, Take 1 capsule (12 5 mg total) by mouth daily, Disp: 30 capsule, Rfl: 0    metoprolol tartrate (LOPRESSOR) 25 mg tablet, TAKE 1/2 TABLET EVERY 12 HOURS, Disp: 30 tablet, Rfl: 11    Multiple Vitamins-Minerals (MULTIVITAMIN ADULT PO), Take 1 tablet by mouth daily, Disp: , Rfl:     nitroglycerin (NITROSTAT) 0 4 mg SL tablet, Place 1 tablet (0 4 mg total) under the tongue every 5 (five) minutes as needed for chest pain, Disp: 25 tablet, Rfl: 2    polyethylene glycol (MIRALAX) 17 g packet, Take 17 g by mouth daily, Disp: , Rfl:     potassium chloride (K-DUR,KLOR-CON) 20 mEq tablet, Take 2 tablets (40 mEq total) by mouth daily As directed, Disp: 60 tablet, Rfl: 0    senna-docusate sodium (SENOKOT S) 8 6-50 mg per tablet, Take 1 tablet by mouth 2 (two) times a day, Disp: , Rfl: 0    acetaminophen (TYLENOL) 325 mg tablet, Take 3 tablets (975 mg total) by mouth every 8 (eight) hours (Patient not taking: No sig reported), Disp: , Rfl: 0    enoxaparin (LOVENOX) 40 mg/0 4 mL, Inject 0 4 mL (40 mg total) under the skin daily for 28 days (Patient not taking: Reported on 9/2/2022), Disp: 11 2 mL, Rfl: 0    gabapentin (NEURONTIN) 100 mg capsule, Take 1 capsule (100 mg total) by mouth daily at bedtime (Patient not taking: No sig reported), Disp: , Rfl: 0    tamsulosin (FLOMAX) 0 4 mg, TAKE 1 CAPSULE (0 4 MG TOTAL) BY MOUTH DAILY AT BEDTIME (Patient not taking: No sig reported), Disp: 30 capsule, Rfl: 5      Review of Systems:  Have you recently had or currently have any of the following? If YES, what are you doing for the problem? · Fever, chills or unintended weight loss: No  · Sudden loss or change in your vision: No  · Nausea, vomiting or blood in your stool: No  · Painful or swollen joints: No  · Wheezing or cough: No  · Changing mole or non-healing wound: No  · Nosebleeds: No  · Excessive sweating: No  · Easy or prolonged bleeding? No  · Over the last 2 weeks, how often have you been bothered by the following problems? · Taking little interest or pleasure in doing things: 1 - Not at All  · Feeling down, depressed, or hopeless: 1 - Not at All  · Rapid heartbeat with epinephrine:  No    · FEMALES ONLY:    · Are you pregnant or planning to become pregnant? N/A  · Are you currently or planning to be nursing or breast feeding? N/A    · Any known allergies? No Known Allergies      Physical Exam:     Was a chaperone (Derm Clinical Assistant) present throughout the entire Physical Exam? Yes     Did the Dermatology Team specifically  the patient on the importance of a Full Skin Exam to be sure that nothing is missed clinically?  Yes}  o Did the patient ultimately request or accept a Full Skin Exam?  NO  o Did the patient specifically refuse to have the areas "under-the-bra" examined by the Dermatologist? No  o Did the patient specifically refuse to have the areas "under-the-underwear" examined by the Dermatologist? No    CONSTITUTIONAL:   Vitals:    09/02/22 1442   Temp: 98 7 °F (37 1 °C)   TempSrc: Temporal   Weight: 93 4 kg (206 lb)   Height: 6' 1" (1 854 m)       PSYCH: Normal mood and affect  EYES: Normal conjunctiva  ENT: Normal lips and oral mucosa  CARDIOVASCULAR: No edema  RESPIRATORY: Normal respirations  HEME/LYMPH/IMMUNO:  No regional lymphadenopathy except as noted below in "ASSESSMENT AND PLAN BY DIAGNOSIS"    SKIN:  FULL ORGAN SYSTEM EXAM  Hair, Scalp, Ears, Face Normal except as noted below in Assessment   Neck, Cervical Chain Nodes Normal except as noted below in Assessment   Right Arm/Hand/Fingers Normal except as noted below in Assessment   Left Arm/Hand/Fingers Normal except as noted below in Assessment   Chest/Breasts/Axillae Viewed areas Normal except as noted below in Assessment   Abdomen, Umbilicus Normal except as noted below in Assessment   Back/Spine Normal except as noted below in Assessment   Groin/Genitalia/Buttocks Normal except as noted below in Assessment   Right Leg, Foot, Toes Normal except as noted below in Assessment   Left Leg, Foot, Toes Normal except as noted below in Assessment        Assessment and Plan by Diagnosis:    History of Present Condition:     Duration:  How long has this been an issue for you?    o  2-3 months    Location Affected:  Where on the body is this affecting you?    o  Scalp    Quality:  Is there any bleeding, pain, itch, burning/irritation, or redness associated with the skin lesion?    o  Itching and scratching    Severity:  Describe any bleeding, pain, itch, burning/irritation, or redness on a scale of 1 to 10 (with 10 being the worst)  o  1   Timing:  Does this condition seem to be there pretty constantly or do you notice it more at specific times throughout the day?    o  Constant    Context:  Have you ever noticed that this condition seems to be associated with specific activities you do?    o  Denies    Modifying Factors:    o Anything that seems to make the condition worse?    -  Denies   o What have you tried to do to make the condition better? -  Vinegar, Cerva     Associated Signs and Symptoms:  Does this skin lesion seem to be associated with any of the following:  o  SL AMB DERM SIGNS AND SYMPTOMS: Itching and Scratching     1  DERMATITIS     Physical Exam:   Anatomic Location Affected: Hands and legs    Morphological Description:     Severity:    Pertinent Positives:   Pertinent Negatives:     Additional History of Present Condition:  Patient states he was diagnosed with Eczema when he was a child  Patient currently uses Cerva cream  Patient currently has dry patches  Patient states the patches are itchy so he scratches  Assessment and Plan:  Based on a thorough discussion of this condition and the management approach to it (including a comprehensive discussion of the known risks, side effects and potential benefits of treatment), the patient (family) agrees to implement the following specific plan:  Use moisturizer like Eucerin,Cerave, Vanicream or Aveeno Cream 3 times a day for the dry skin           Start using Dove moisturizer bar soap in the shower        Assessment and Plan:   Atopic Dermatitis is a chronic, itchy skin condition that is very common in children but may occur at any age  It is also known as eczema or atopic eczema   It is the most common form of dermatitis  Atopic dermatitis usually occurs in people who have an atopic tendency    This means they may develop any or all of these closely linked conditions: Atopic dermatitis, asthma, hay fever (allergic rhinitis), eosinophilic esophagitis, and gastroenteritis  Often these conditions run within families with a parent, child or sibling also affected  A family history of asthma, eczema or hay fever is particularly useful in diagnosing atopic dermatitis in infants  Atopic dermatitis arises because of a complex interaction of genetic and environmental factors  These include defects in skin barrier function making the skin more susceptible to irritation by soap and other contact irritants, the weather, temperature and non-specific triggers  There is also an element of immune system dysregulation that is often present  By definition, it is chronic and has a "waxing-waning" nature; flares should be expected but with good education and treatment strategies can be minimized      Your treatment plan   Using only mild cleansers (hypoallergenic and without fragrances) and fragrance free detergent (not unscented products which contain a masking agent)   Apply moisturizer to entire body at least 2 times a day   Use the above listed medication to affected areas twice a day for a full 2 days after the rash has cleared   Take on over the counter antihistamine like diphenhydramine before bed if the itching is keeping you awake              ATOPIC DERMATITIS FAQS    WHAT IS ATOPIC DERMATITIS? Atopic dermatitis (also called eczema) results from a weak skin barrier and an over active immune system  The weak skin barrier allows things to get into the skin and then the immune system has an intense reaction to this substances  The result is itchy red patches anywhere on the body  WHO GETS ATOPIC DERMATITIS? There is no single answer because many factors are involved  It is likely a combination of genetic makeup and environmental triggers and/or exposures  People with atopic dermatitis are prone to other types of "atopy"  They are at increased risk for food allergies, Asthma and hay fever and there is often a family history of these problems as well  WHAT ABOUT FOOD ALLERGIES? This is a very controversial topic, as many believe that food allergies are responsible for skin flares  In some cases, specific foods may cause worsening of atopic dermatitis; however this occurs in a minority of cases and usually happens within a few hours of ingestion  While food allergy is more common in children with eczema, foods are specific triggers for flares in only a small percentage of children  If you notice that the skin flares after certain foods you can see if eliminating one food at time makes a difference, as long as your child can still enjoy a well-balanced diet  There are blood (RAST) and skin (PRICK) tests that can check for allergies, but they are often positive in children who are not truly allergic   Therefore it is important that you work with your allergist and dermatologist to determine which foods are relevant and causing true symptoms  Extreme food elimination diets without the guidance of your doctor, which have become more popular in recent years, may even result in worsening of the skin rash due to malnutrition and avoidance of essential nutrients  WHY SO MUCH MOISTURIZER? This is the 1st step and most important part of treatment  This helps maintaint the skin's barrier function and prevent flares  Creams and ointments are preferable over lotions  Aveeno eczema relief and Eucerin eczema relief  and cerave are all good ones to start with  It is best to put  moisturizer on directly after bathing, while the skin is damp, it is twice as effective then  You may bathe daily  Use warm - not hot - water, for short periods of time (5-10 minutes at a time) Dry off by Lightly patting the skin with a towel and not rubbing  While the skin is still damp, (within 3 minutes) apply any medications to the rashy areas 1st and then moisturize the entire body  It's best to apply the medication 1st but if the medications sting, moisturizer can be applied 1st     WHY USE THE MEDICATION FOR AN EXTRA 2 DAYS AFTER THE RASH IS GONE? Sometimes the rash may appear to be gone, but there are still microscopic changes in the skin  Using the medication and extra 2 days will ensure the inflammation has resolve and make the rash less likely to return quickly  WHAT ARE TRIGGERS?   Occasionally there are specific things that trigger itching and rashes, but in many cases may none can be identified  The most common triggers are:   Heat and sweat for some individuals, cold weather for others   House dust mites, pet fur   Wool; synthetic fabrics like nylon; dyed fabrics   Tobacco smoke    Fragrances in: shampoos, soaps, lotions, laundry detergents, fabric softeners   Saliva or prolonged exposure to water  start with these    Avoid the use of fabric softeners in the washing machine or dryer sheets (unless they are fragrance-free)  Try to use laundry detergents, soaps and shampoos that are fragrance-free  You may find it helpful to double-rinse your clothes  Some children are sensitive to house dust mites and they may benefit from a plastic mattress wrap  While food allergy is more common in children with eczema, foods are specific triggers for flares in only a small percentage of children  If you notice that the skin flares after certain foods you can see if eliminating one food at time makes a difference, as long as your child can still enjoy a well-balanced diet  4) WHAT DOES THE ANTIHISTAMINE DO? Antihistamines help you not to scratch  Scratching only makes the skin more reactive and the barrier function even more disrupted  It can cause both children and their parents to lose sleep! There are different types of anti-itch medications  Some cause more drowsiness than others  Both types are acceptable depending on your child and your preference  Start with Benadryl and if that does not work, ask for a prescription antihistamine      5) ARE THERE ANY SIDE EFFECTS TO WORRY ABOUT? Corticosteroid creams and ointments (generally things with "-one" or "josiah" on the end of their names): The strength of the cream or ointment depends on the name of the active ingredient  The numbers at the end do not indicate the relative strength  Thus triamcinolone 0 1% ointment, considered a mid-strength corticosteroid, is much stronger than hydrocortisone 1% even though the number following the name is much lower  Topical corticosteroids are very effective in treating atopic dermatitis  When used in the manner prescribed (to rashy areas of skin and for no more than a few weeks at a time to any one area) they are very safe  These are corticosteroids and are anti-inflammatory, not the anabolic steroids like those used illegally by some athletes   It is important that you do not overuse steroid creams, and if you notice a thin, shiny appearance to the skin or broken blood vessels, you should stop using the cream and consult your health care provider regarding possible overuse/overthinning of the skin  The face, armpits and groin have particularly thin and sensitive skin and are therefore most at risk for bad results if steroids are over-used in these sites  Topical non-steroid creams and ointments (immunomodulators): These creams and ointments are also called topical calcineurin inhibitors (TCIs)  These include Protopic ointment and Elidel cream  Crisaborole 2% Tomas Zamorano) is a prescription ointment that targets an enzyme called PDE4 (phosphodiesterase 4)  It is used on the skin topically to treat mild-to-moderate eczema in adults and children 3years of age and older  In total, these nonsteroidal prescriptions are used to help decrease itching and redness in the skin  They are not as strong as most steroid creams; however, it is believed that they do not thin the skin when overused  They are generally used as second-line medications, though they may be used alone or in conjunction with topical steroids  In sensitive areas such as the face, underarms or groin, they are often recommended  They can sting inflamed skin, but are generally well tolerated once the skin is healing  The FDA placed a black-box warning on both Elidel and Protopic in 2006 based on animal studies using the medications  Some animals developed skin cancer and lymphoma  Subsequently, the FDA released a statement that there is no causal relationship between the two medications and cancer  Because of this concern, there are ongoing studies to evaluate this relationship in humans  So far, there are studies that support the safety of these medications    One showed that the rates of cancer in patient using these medications topically were less than the rates of the general population and another showed that in patient's using the medication over a large area of the body, the levels of the medication in the blood was undetectable  As for Eucrisa, this product is only approved for the topical treatment of mild-to-moderate eczema in patients 3years of age and older; use of the medication in kids younger than 2 is considered off label and has not been formally studied  Burning and stinging are the most commonly reported side effects of this medication  Rarely, this product has been known to cause hives and hypersensitivity reactions; discontinue its use if you develop severe itching, swelling, or redness in the area of application  2  ACTINIC KERATOSeS    Physical Exam:   Anatomic Location Affected:  Scalp, forehead and right outer elbow    Morphological Description:  See photos                 Additional History of Present Condition:  Patient states he has had these spots for the past couple years  His former dermatologist had preformed cryotherapy to treat these spots  Patient states he has not been to a dermatologist since the pandemic  Assessment and Plan:  Based on a thorough discussion of this condition and the management approach to it (including a comprehensive discussion of the known risks, side effects and potential benefits of treatment), the patient (family) agrees to implement the following specific plan:     We might start imiquimod cream or 5FU/calcipotriene after the biopsy of the nodules, but patient is reluctant to use such agents   Discussed side effects of the cream     Expect irritation   Monitor for any changes     Actinic keratoses are very common on sites repeatedly exposed to the sun, especially the backs of the hands and the face  They are considered precancers and have a low risk of turning into squamous cell carcinoma   It is rare for a solitary actinic keratosis to evolve into a squamous cell carcinoma (SCC), but the risk is 10-15% when more than 10 actinic keratoses are present  A tender, thickened, ulcerated or enlarging actinic keratosis is suspicious of SCC  Actinic keratoses may be prevented by strict sun protection  If already present, keratoses may improve with a very high sun protection factor (50+) broad-spectrum sunscreen applied at least daily to affected areas, year-round  We recommend that sun protective clothing and hats and sunglasses be worn whenever possible  Note that you can make you own UPF 30 rate clothing using just your own washing machine with a product called sun guard    There are several different options for treating actinic keratoses     Topical medications such as 5-fluorouracil or Aldara  - good for field treatment ie treats what's seen and not seen     Cryotherapy - good for single spots but treats [place] what we see versus a field treatment     Photodynamic therapy - involves application of a light sensitizing medicine and then exposure to a special light, also a good field treatment        Today we opted to treat your actinic keratoses with Imiquimod in the future  NEOPLASM OF UNCERTAIN BEHAVIOR OF SKIN    Reddish dermal plaques of anterior scalp x months  Scheduled for incisional biopsy in one week to assess for [neoplasm]  Fluoururacil or imiquimod or other treatment deferred until after biopsy      Scribe Attestation    I,:  Bakari Dumont am acting as a scribe while in the presence of the attending physician :       I,:  Brisa Wong MD personally performed the services described in this documentation    as scribed in my presence :

## 2022-09-09 ENCOUNTER — PROCEDURE VISIT (OUTPATIENT)
Dept: DERMATOLOGY | Facility: CLINIC | Age: 87
End: 2022-09-09
Payer: COMMERCIAL

## 2022-09-09 VITALS
WEIGHT: 206 LBS | HEART RATE: 56 BPM | HEIGHT: 73 IN | BODY MASS INDEX: 27.3 KG/M2 | TEMPERATURE: 97.9 F | OXYGEN SATURATION: 98 %

## 2022-09-09 DIAGNOSIS — D48.5 NEOPLASM OF UNCERTAIN BEHAVIOR OF SKIN: Primary | ICD-10-CM

## 2022-09-09 PROCEDURE — 11104 PUNCH BX SKIN SINGLE LESION: CPT | Performed by: DERMATOLOGY

## 2022-09-09 PROCEDURE — 81342 TRG GENE REARRANGEMENT ANAL: CPT | Performed by: PATHOLOGY

## 2022-09-09 PROCEDURE — 88365 INSITU HYBRIDIZATION (FISH): CPT | Performed by: PATHOLOGY

## 2022-09-09 PROCEDURE — 88364 INSITU HYBRIDIZATION (FISH): CPT | Performed by: PATHOLOGY

## 2022-09-09 PROCEDURE — 88342 IMHCHEM/IMCYTCHM 1ST ANTB: CPT | Performed by: PATHOLOGY

## 2022-09-09 PROCEDURE — 88341 IMHCHEM/IMCYTCHM EA ADD ANTB: CPT | Performed by: PATHOLOGY

## 2022-09-09 PROCEDURE — 88312 SPECIAL STAINS GROUP 1: CPT | Performed by: PATHOLOGY

## 2022-09-09 PROCEDURE — 88313 SPECIAL STAINS GROUP 2: CPT | Performed by: PATHOLOGY

## 2022-09-09 PROCEDURE — 81261 IGH GENE REARRANGE AMP METH: CPT | Performed by: PATHOLOGY

## 2022-09-09 PROCEDURE — 88305 TISSUE EXAM BY PATHOLOGIST: CPT | Performed by: PATHOLOGY

## 2022-09-09 NOTE — PROGRESS NOTES
[facility] Follow Up Note    Patient Name: Wade Dudley  Encounter Date: 9/9/2022    Today's Chief Concerns:  Aman Pabon Concern #1:  Procedure on scalp      Current Medications:    Current Outpatient Medications:     amLODIPine (NORVASC) 5 mg tablet, TAKE 1 TABLET TWICE DAILY, Disp: 60 tablet, Rfl: 10    B Complex Vitamins (VITAMIN B COMPLEX PO), Take 1 tablet by mouth daily, Disp: , Rfl:     bisacodyl (DULCOLAX) 5 mg EC tablet, Take 1 tablet (5 mg total) by mouth daily as needed for constipation, Disp: 10 tablet, Rfl: 0    Cholecalciferol (VITAMIN D PO), Take 1 capsule by mouth daily , Disp: , Rfl:     GaviLAX 17 GM/SCOOP powder, TAKE 17 G (1 CAPFUL) BY MOUTH DAILY AS NEEDED (CONSTIPATION) - MIX WITH LIQUID BEFORE TAKING , Disp: 510 g, Rfl: 5    hydrochlorothiazide (MICROZIDE) 12 5 mg capsule, Take 1 capsule (12 5 mg total) by mouth daily, Disp: 30 capsule, Rfl: 0    metoprolol tartrate (LOPRESSOR) 25 mg tablet, TAKE 1/2 TABLET EVERY 12 HOURS, Disp: 30 tablet, Rfl: 11    Multiple Vitamins-Minerals (MULTIVITAMIN ADULT PO), Take 1 tablet by mouth daily, Disp: , Rfl:     polyethylene glycol (MIRALAX) 17 g packet, Take 17 g by mouth daily, Disp: , Rfl:     potassium chloride (K-DUR,KLOR-CON) 20 mEq tablet, Take 2 tablets (40 mEq total) by mouth daily As directed, Disp: 60 tablet, Rfl: 0    acetaminophen (TYLENOL) 325 mg tablet, Take 3 tablets (975 mg total) by mouth every 8 (eight) hours (Patient not taking: No sig reported), Disp: , Rfl: 0    enoxaparin (LOVENOX) 40 mg/0 4 mL, Inject 0 4 mL (40 mg total) under the skin daily for 28 days (Patient not taking: Reported on 9/2/2022), Disp: 11 2 mL, Rfl: 0    gabapentin (NEURONTIN) 100 mg capsule, Take 1 capsule (100 mg total) by mouth daily at bedtime (Patient not taking: No sig reported), Disp: , Rfl: 0    nitroglycerin (NITROSTAT) 0 4 mg SL tablet, Place 1 tablet (0 4 mg total) under the tongue every 5 (five) minutes as needed for chest pain (Patient not taking: Reported on 9/9/2022), Disp: 25 tablet, Rfl: 2    senna-docusate sodium (SENOKOT S) 8 6-50 mg per tablet, Take 1 tablet by mouth 2 (two) times a day (Patient not taking: Reported on 9/9/2022), Disp: , Rfl: 0    tamsulosin (FLOMAX) 0 4 mg, TAKE 1 CAPSULE (0 4 MG TOTAL) BY MOUTH DAILY AT BEDTIME (Patient not taking: No sig reported), Disp: 30 capsule, Rfl: 5    CONSTITUTIONAL:   Vitals:    09/09/22 1239   Pulse: 56   Temp: 97 9 °F (36 6 °C)   TempSrc: Temporal   SpO2: 98%   Weight: 93 4 kg (206 lb)   Height: 6' 1" (1 854 m)           Specific Alerts:    Have you been seen by a [facility] Dermatologist in the last 3 years? YES    Are you pregnant or planning to become pregnant? N/A    Are you currently or planning to be nursing or breast feeding? N/A    No Known Allergies    May we call your Preferred Phone number to discuss your specific medical information? YES    May we leave a detailed message that includes your specific medical information? YES    Have you traveled outside of the [facility] in the past 3 months? No    Do you currently have a pacemaker or defibrillator? YES, Pacemaker    Do you have any artificial heart valves, joints, plates, screws, rods, stents, pins, etc? No   - If Yes, were any placed within the last 2 years? Do you require any medications prior to a surgical procedure? No    Are you taking any medications that cause you to bleed more easily ("blood thinners") No    Have you ever experienced a rapid heartbeat with epinephrine? No      Review of Systems:  Have you recently had or currently have any of the following?     · Fever or chills: No  · Night Sweats: No  · Headaches: No  · Weight Gain: No  · Weight Loss: No  · Blurry Vision: No  · Nausea: No  · Vomiting: No  · Diarrhea: No  · Blood in Stool: No  · Abdominal Pain: No  · Itchy Skin: No  · Painful Joints: No  · Swollen Joints: No  · Muscle Pain: No  · Irregular Mole: No  · Sun Burn: No  · Dry Skin: No  · Skin Color Changes: No  · Scar or Keloid: No  · Cold Sores/Fever Blisters: No  · Bacterial Infections/MRSA: No  · Anxiety: No  · Depression: No  · Suicidal or Homicidal Thoughts: No      PSYCH: Normal mood and affect  EYES: Normal conjunctiva  ENT: Normal lips and oral mucosa  CARDIOVASCULAR: No edema  RESPIRATORY: Normal respirations  HEME/LYMPH/IMMUNO:  No regional lymphadenopathy except as noted below in ASSESSMENT AND PLAN BY DIAGNOSIS    FOCUSED ORGAN SYSTEM SKIN EXAM (SKIN)  Scalp Normal except as noted below in Assessment       NEOPLASM OF UNCERTAIN BEHAVIOR OF SKIN    Physical Exam:   (Anatomic Location); (Size and Morphological Description); (Differential Diagnosis):   A: Frontal scalp; Skin; Incision Punch biopsy; clustered indurated well demarcated red plaques without secondary skin changes; Differential Diagnosis: Angiosarcoma versus cutaneous metastasis  o    Pertinent Positives:   Pertinent Negatives: Additional History of Present Condition:  Present for couple months  Assessment and Plan:   I have discussed with the patient that a sample of skin via a "skin biopsy would be potentially helpful to further make a specific diagnosis under the microscope   Based on a thorough discussion of this condition and the management approach to it (including a comprehensive discussion of the known risks, side effects and potential benefits of treatment), the patient (family) agrees to implement the following specific plan:    o Procedure:  Skin Biopsy  After a thorough discussion of treatment options and risk/benefits/alternatives (including but not limited to local pain, scarring, dyspigmentation, blistering, possible superinfection, and inability to confirm a diagnosis via histopathology), verbal and written consent were obtained and portion of the rash was biopsied for tissue sample  See below for consent that was obtained from patient and subsequent Procedure Note        PROCEDURE NOTE:  PUNCH BIOPSY      Performing Physician: Dr Maloney    Anatomic Location; Clinical Description with size (cm); Pre-Op Diagnosis:     A: Frontal scalp; Skin; Incision Punch biopsy; indurated some well demarcated red plaques without secondary skin changes; Differential Diagnosis: Angiosarcoma versus cutaneous metastasis  Anesthesia: 2% Lidocaine  HCL      Topical anesthesia: None       Indications: To indicate diagnosis and management plan  Procedure Details     Patient informed of the risks (including bleeding,scaring and infection) and benefits of the procedure explained  Verbal and written informed consent obtained  The area was prepped and draped in the usual fashion  Anesthesia was obtained with 2 % lidocaine without epinephrine  The skin was then stretched perpendicular to the skin tension lines and a punch biopsy to an appropriate sampling depth was obtained with a 4 mm punch with a forceps and iris scissors  Hemostasis was obtained with 4-0 Ethilon x 2 sutures  Complications:  None      Specimen has been sent for review by Dermatopathology  Plan:  1  Instructed to keep the wound dry and covered for 24-48h and clean thereafter  2  Warning signs of infection were reviewed  3  Recommended that the patient use acetaminophen as needed for pain  4  Sutures if any should be removed in 14 days      Standard post-procedure care has been explained and has been included in written form within the patient's copy of Informed Consent        Scribe Attestation    I,:  Karan Ortiz am acting as a scribe while in the presence of the attending physician :       I,:  Brandee Rico MD personally performed the services described in this documentation    as scribed in my presence :       Reny Fountain DO

## 2022-09-09 NOTE — PATIENT INSTRUCTIONS
Specimen has been sent for review by Dermatopathology  Plan:  1  Instructed to keep the wound dry and covered for 24-48h and clean thereafter  2  Warning signs of infection were reviewed  3  Recommended that the patient use acetaminophen as needed for pain  4  Sutures if any should be removed in 14 days      Standard post-procedure care has been explained and has been included in written form within the patient's copy of Informed Consent

## 2022-09-13 DIAGNOSIS — I10 ESSENTIAL (PRIMARY) HYPERTENSION: ICD-10-CM

## 2022-09-14 RX ORDER — HYDROCHLOROTHIAZIDE 12.5 MG/1
12.5 CAPSULE, GELATIN COATED ORAL DAILY
Qty: 30 CAPSULE | Refills: 0 | Status: SHIPPED | OUTPATIENT
Start: 2022-09-14 | End: 2022-10-12

## 2022-09-14 RX ORDER — POTASSIUM CHLORIDE 20 MEQ/1
40 TABLET, EXTENDED RELEASE ORAL DAILY
Qty: 60 TABLET | Refills: 0 | Status: SHIPPED | OUTPATIENT
Start: 2022-09-14 | End: 2022-10-12

## 2022-09-16 ENCOUNTER — REMOTE DEVICE CLINIC VISIT (OUTPATIENT)
Dept: CARDIOLOGY CLINIC | Facility: CLINIC | Age: 87
End: 2022-09-16
Payer: COMMERCIAL

## 2022-09-16 DIAGNOSIS — Z95.0 PRESENCE OF PERMANENT CARDIAC PACEMAKER: Primary | ICD-10-CM

## 2022-09-16 PROCEDURE — 93294 REM INTERROG EVL PM/LDLS PM: CPT | Performed by: INTERNAL MEDICINE

## 2022-09-16 PROCEDURE — 93296 REM INTERROG EVL PM/IDS: CPT | Performed by: INTERNAL MEDICINE

## 2022-09-20 NOTE — PROGRESS NOTES
Results for orders placed or performed in visit on 09/16/22   Cardiac EP device report    Narrative    BSC-DUAL CHAMBER PPM (DDD MODE)/ NOT MRI CONDITIONAL  LATITUDE TRANSMISSION: BATTERY VOLTAGE ADEQUATE  (2 5 YRS) AP 25%  62%  ALL AVAILABLE LEAD PARAMETERS WITHIN NORMAL LIMITS  NO SIGNIFICANT HIGH RATE EPISODES  1 ATR EPISODE DETECTED 4 SEC LONG  NORMAL DEVICE FUNCTION  ---ROONEY

## 2022-09-23 ENCOUNTER — OFFICE VISIT (OUTPATIENT)
Dept: DERMATOLOGY | Facility: CLINIC | Age: 87
End: 2022-09-23

## 2022-09-23 DIAGNOSIS — Z48.02 ENCOUNTER FOR REMOVAL OF SUTURES: Primary | ICD-10-CM

## 2022-09-23 PROCEDURE — RECHECK: Performed by: DERMATOLOGY

## 2022-09-23 NOTE — PROGRESS NOTES
Suture removal    Date/Time: 9/23/2022 1:26 PM  Performed by: Zay Suarez RN  Authorized by: Wilfredo Burleson MD   Universal Protocol:  Consent: Verbal consent obtained  Written consent not obtained  Risks and benefits: risks, benefits and alternatives were discussed  Consent given by: patient  Time out: Immediately prior to procedure a "time out" was called to verify the correct patient, procedure, equipment, support staff and site/side marked as required  Timeout called at: 9/23/2022 1:27 PM   Patient understanding: patient states understanding of the procedure being performed  Patient consent: the patient's understanding of the procedure matches consent given  Procedure consent: procedure consent matches procedure scheduled  Relevant documents: relevant documents present and verified  Test results: test results not available  Site marked: the operative site was marked  Radiology Images displayed and confirmed  If images not available, report reviewed: imaging studies not available  Patient identity confirmed: verbally with patient        Patient location:  Clinic  Location:     Location:  [facility] location:  Scalp (Frontal scalp )  Procedure details: Tools used:  Suture removal kit    Wound appearance:  No sign(s) of infection, good wound healing, pink and nontender    Number of sutures removed:  2  Post-procedure details:     Post-removal:  Band-Aid applied (Vaseline applied )    Patient tolerance of procedure: Tolerated well, no immediate complications  Comments:      Patient informed Dr Sary Dee will call pt once biopsy results come back

## 2022-10-03 ENCOUNTER — TELEPHONE (OUTPATIENT)
Dept: DERMATOLOGY | Facility: CLINIC | Age: 87
End: 2022-10-03

## 2022-10-03 NOTE — TELEPHONE ENCOUNTER
Patient called back in regards to you calling him about results  Patient states if he does not answer the phone a detailed message is okay to leave on voicemail

## 2022-10-03 NOTE — TELEPHONE ENCOUNTER
The specimen remains pending  It is undergoing additional testing and will be evaluated by a consultant dermatopathologist   At this point in time we have no evidence of any cancer or anything else worrisome, and that is unlikely to change

## 2022-10-05 NOTE — TELEPHONE ENCOUNTER
Pt called in and said he received a v/m from our office  He would like someone to please return a call b/c he said he doesn't understand what he should do next, he said he is a little confused   Thank you

## 2022-10-06 ENCOUNTER — TELEMEDICINE (OUTPATIENT)
Dept: CARDIOLOGY CLINIC | Facility: CLINIC | Age: 87
End: 2022-10-06
Payer: COMMERCIAL

## 2022-10-06 VITALS
BODY MASS INDEX: 27.18 KG/M2 | SYSTOLIC BLOOD PRESSURE: 138 MMHG | DIASTOLIC BLOOD PRESSURE: 71 MMHG | HEART RATE: 56 BPM | WEIGHT: 206 LBS

## 2022-10-06 DIAGNOSIS — I10 ESSENTIAL (PRIMARY) HYPERTENSION: ICD-10-CM

## 2022-10-06 DIAGNOSIS — I35.9 AORTIC VALVE DISEASE: ICD-10-CM

## 2022-10-06 DIAGNOSIS — I47.1 SVT (SUPRAVENTRICULAR TACHYCARDIA) (HCC): ICD-10-CM

## 2022-10-06 DIAGNOSIS — Z95.0 PRESENCE OF PERMANENT CARDIAC PACEMAKER: Primary | ICD-10-CM

## 2022-10-06 DIAGNOSIS — I51.7 LVH (LEFT VENTRICULAR HYPERTROPHY): ICD-10-CM

## 2022-10-06 PROCEDURE — 99442 PR PHYS/QHP TELEPHONE EVALUATION 11-20 MIN: CPT | Performed by: INTERNAL MEDICINE

## 2022-10-10 ENCOUNTER — TELEPHONE (OUTPATIENT)
Dept: DERMATOLOGY | Facility: CLINIC | Age: 87
End: 2022-10-10

## 2022-10-10 NOTE — TELEPHONE ENCOUNTER
Spoke with Mr Celine Huffman over the phone and we discussed his pathology report  He understands that we are sending it out for second opinion and will contact him when the result populates  I informed Mr Celine Huffman that he can always contact me personally or Dr Marcelo Portillo via [address]  All questions were answered at this time

## 2022-10-10 NOTE — TELEPHONE ENCOUNTER
Nadia Hair birthdate 1935  I'm calling about if we call  I got a call 1 ring today and hung up  Got a message to call you  I've tried calling you for a week  I get called back the next day and have nothing to do with what I'm talking about  This is very frustrating and almost just ridiculous  Sorry  That's what I feel  Doctor Eliz Pena  has surgery for me biopsy  I have the results on my chart Don't I need explanation what they mean? I need appointment somehow with either Doctor Eliz Pena or Doctor see if there's SENFT  Thank you  I hope I get a call before tomorrow  Tell me that I need to call you again  Bye bye  I believe Pt is unsure about his biopsy results  Unsure if we should schedule him for an excision or not? Please advise

## 2022-10-11 DIAGNOSIS — I10 ESSENTIAL (PRIMARY) HYPERTENSION: ICD-10-CM

## 2022-10-12 RX ORDER — POTASSIUM CHLORIDE 20 MEQ/1
40 TABLET, EXTENDED RELEASE ORAL DAILY
Qty: 60 TABLET | Refills: 0 | Status: SHIPPED | OUTPATIENT
Start: 2022-10-12

## 2022-10-12 RX ORDER — HYDROCHLOROTHIAZIDE 12.5 MG/1
12.5 CAPSULE, GELATIN COATED ORAL DAILY
Qty: 30 CAPSULE | Refills: 0 | Status: SHIPPED | OUTPATIENT
Start: 2022-10-12 | End: 2022-10-13

## 2022-10-13 DIAGNOSIS — I10 ESSENTIAL (PRIMARY) HYPERTENSION: ICD-10-CM

## 2022-10-13 RX ORDER — HYDROCHLOROTHIAZIDE 12.5 MG/1
CAPSULE, GELATIN COATED ORAL
Qty: 30 CAPSULE | Refills: 0 | Status: SHIPPED | OUTPATIENT
Start: 2022-10-13

## 2022-11-07 ENCOUNTER — TELEPHONE (OUTPATIENT)
Dept: DERMATOLOGY | Facility: CLINIC | Age: 87
End: 2022-11-07

## 2022-11-07 NOTE — TELEPHONE ENCOUNTER
Called pt and lvm to schedule appt for biposy for spots of concern on scalp at [facility] in [place] clinic per Dr Conchita Kohler

## 2022-11-08 DIAGNOSIS — I10 ESSENTIAL (PRIMARY) HYPERTENSION: ICD-10-CM

## 2022-11-09 RX ORDER — POTASSIUM CHLORIDE 20 MEQ/1
40 TABLET, EXTENDED RELEASE ORAL DAILY
Qty: 60 TABLET | Refills: 0 | Status: SHIPPED | OUTPATIENT
Start: 2022-11-09

## 2022-12-07 DIAGNOSIS — I10 ESSENTIAL (PRIMARY) HYPERTENSION: ICD-10-CM

## 2022-12-08 RX ORDER — HYDROCHLOROTHIAZIDE 12.5 MG/1
CAPSULE, GELATIN COATED ORAL
Qty: 30 CAPSULE | Refills: 0 | Status: SHIPPED | OUTPATIENT
Start: 2022-12-08

## 2022-12-08 RX ORDER — POTASSIUM CHLORIDE 20 MEQ/1
40 TABLET, EXTENDED RELEASE ORAL DAILY
Qty: 60 TABLET | Refills: 0 | Status: SHIPPED | OUTPATIENT
Start: 2022-12-08

## 2022-12-16 ENCOUNTER — REMOTE DEVICE CLINIC VISIT (OUTPATIENT)
Dept: CARDIOLOGY CLINIC | Facility: CLINIC | Age: 87
End: 2022-12-16

## 2022-12-16 DIAGNOSIS — Z95.0 CARDIAC PACEMAKER IN SITU: Primary | ICD-10-CM

## 2022-12-16 NOTE — PROGRESS NOTES
Results for orders placed or performed in visit on 12/16/22   Cardiac EP device report    Narrative    BSC-DUAL CHAMBER PPM (DDD MODE)/ NOT MRI CONDITIONAL  LATITUDE TRANSMISSION: BATTERY VOLTAGE ADEQUATE (2 5 YRS)  AP-26%, -64% (>40% Reinier@[employer])  ALL AVAILABLE LEAD PARAMETERS WITHIN NORMAL LIMITS  NO NEW SIGNIFICANT HIGH RATE EPISODES  NORMAL DEVICE FUNCTION   GV

## 2022-12-22 ENCOUNTER — TELEPHONE (OUTPATIENT)
Dept: DERMATOLOGY | Facility: CLINIC | Age: 87
End: 2022-12-22

## 2022-12-22 NOTE — TELEPHONE ENCOUNTER
----- Message from Rosmery Lai sent at 10/31/2022  3:00 PM EDT -----    ----- Message -----  From: Radha Beltran DO  Sent: 10/28/2022   4:24 PM EDT  To: [facility]    Hi Team,     This patient needs to be scheduled for follow up on a Friday or Monday afternoon at [facility] for repeat punch biopsy       Thanks,     Brooklyn Jones

## 2022-12-22 NOTE — TELEPHONE ENCOUNTER
No providers available for these upcoming [address]; will contact him back to schedule for next available Friday at   Patient struggles with transportation can come in Friday afternoon to [facility] AT [place]

## 2023-01-04 DIAGNOSIS — I10 ESSENTIAL (PRIMARY) HYPERTENSION: ICD-10-CM

## 2023-01-04 DIAGNOSIS — K59.00 CONSTIPATION: ICD-10-CM

## 2023-01-04 RX ORDER — HYDROCHLOROTHIAZIDE 12.5 MG/1
CAPSULE, GELATIN COATED ORAL
Qty: 30 CAPSULE | Refills: 0 | Status: SHIPPED | OUTPATIENT
Start: 2023-01-04

## 2023-01-04 RX ORDER — POLYETHYLENE GLYCOL 3350 17 G/17G
POWDER, FOR SOLUTION ORAL
Qty: 510 G | Refills: 5 | Status: SHIPPED | OUTPATIENT
Start: 2023-01-04

## 2023-01-31 ENCOUNTER — HOSPITAL ENCOUNTER (INPATIENT)
Facility: HOSPITAL | Age: 88
LOS: 1 days | Discharge: HOME | End: 2023-02-02
Attending: EMERGENCY MEDICINE | Admitting: INTERNAL MEDICINE

## 2023-01-31 DIAGNOSIS — N13.30 HYDRONEPHROSIS: ICD-10-CM

## 2023-01-31 DIAGNOSIS — N20.0 NEPHROLITHIASIS: Primary | ICD-10-CM

## 2023-01-31 DIAGNOSIS — N13.2 HYDRONEPHROSIS WITH URETERAL CALCULUS: ICD-10-CM

## 2023-01-31 LAB
ALBUMIN SERPL BCP-MCNC: 3.8 G/DL (ref 3.5–5)
ALP SERPL-CCNC: 55 U/L (ref 34–104)
ALT SERPL W P-5'-P-CCNC: 23 U/L (ref 7–52)
ANION GAP SERPL CALCULATED.3IONS-SCNC: 8 MMOL/L (ref 4–13)
AST SERPL W P-5'-P-CCNC: 25 U/L (ref 13–39)
BASOPHILS # BLD AUTO: 0.06 THOUSANDS/ÂΜL (ref 0–0.1)
BASOPHILS NFR BLD AUTO: 1 % (ref 0–1)
BILIRUB SERPL-MCNC: 0.58 MG/DL (ref 0.2–1)
BUN SERPL-MCNC: 23 MG/DL (ref 5–25)
CALCIUM SERPL-MCNC: 9.1 MG/DL (ref 8.4–10.2)
CHLORIDE SERPL-SCNC: 105 MMOL/L (ref 96–108)
CO2 SERPL-SCNC: 25 MMOL/L (ref 21–32)
CREAT SERPL-MCNC: 0.69 MG/DL (ref 0.6–1.3)
EOSINOPHIL # BLD AUTO: 0.08 THOUSAND/ÂΜL (ref 0–0.61)
EOSINOPHIL NFR BLD AUTO: 1 % (ref 0–6)
ERYTHROCYTE [DISTWIDTH] IN BLOOD BY AUTOMATED COUNT: 13.3 % (ref 11.6–15.1)
GFR SERPL CREATININE-BSD FRML MDRD: 85 ML/MIN/1.73SQ M
GLUCOSE SERPL-MCNC: 151 MG/DL (ref 65–140)
HCT VFR BLD AUTO: 41.4 % (ref 36.5–49.3)
HGB BLD-MCNC: 14.2 G/DL (ref 12–17)
IMM GRANULOCYTES # BLD AUTO: 0.06 THOUSAND/UL (ref 0–0.2)
IMM GRANULOCYTES NFR BLD AUTO: 1 % (ref 0–2)
LIPASE SERPL-CCNC: 14 U/L (ref 11–82)
LYMPHOCYTES # BLD AUTO: 1.01 THOUSANDS/ÂΜL (ref 0.6–4.47)
LYMPHOCYTES NFR BLD AUTO: 9 % (ref 14–44)
MCH RBC QN AUTO: 34.1 PG (ref 26.8–34.3)
MCHC RBC AUTO-ENTMCNC: 34.3 G/DL (ref 31.4–37.4)
MCV RBC AUTO: 99 FL (ref 82–98)
MONOCYTES # BLD AUTO: 0.71 THOUSAND/ÂΜL (ref 0.17–1.22)
MONOCYTES NFR BLD AUTO: 6 % (ref 4–12)
NEUTROPHILS # BLD AUTO: 9.95 THOUSANDS/ÂΜL (ref 1.85–7.62)
NEUTS SEG NFR BLD AUTO: 82 % (ref 43–75)
NRBC BLD AUTO-RTO: 0 /100 WBCS
PLATELET # BLD AUTO: 240 THOUSANDS/UL (ref 149–390)
PMV BLD AUTO: 11 FL (ref 8.9–12.7)
POTASSIUM SERPL-SCNC: 3.8 MMOL/L (ref 3.5–5.3)
PROT SERPL-MCNC: 6.7 G/DL (ref 6.4–8.4)
RBC # BLD AUTO: 4.17 MILLION/UL (ref 3.88–5.62)
SODIUM SERPL-SCNC: 138 MMOL/L (ref 135–147)
WBC # BLD AUTO: 11.87 THOUSAND/UL (ref 4.31–10.16)

## 2023-02-01 ENCOUNTER — APPOINTMENT (EMERGENCY)
Dept: CT IMAGING | Facility: HOSPITAL | Age: 88
End: 2023-02-01

## 2023-02-01 ENCOUNTER — TELEPHONE (OUTPATIENT)
Dept: UROLOGY | Facility: CLINIC | Age: 88
End: 2023-02-01

## 2023-02-01 ENCOUNTER — ANESTHESIA EVENT (INPATIENT)
Dept: PERIOP | Facility: HOSPITAL | Age: 88
End: 2023-02-01

## 2023-02-01 ENCOUNTER — ANESTHESIA (INPATIENT)
Dept: PERIOP | Facility: HOSPITAL | Age: 88
End: 2023-02-01

## 2023-02-01 ENCOUNTER — APPOINTMENT (INPATIENT)
Dept: RADIOLOGY | Facility: HOSPITAL | Age: 88
End: 2023-02-01

## 2023-02-01 ENCOUNTER — PREP FOR PROCEDURE (OUTPATIENT)
Dept: UROLOGY | Facility: CLINIC | Age: 88
End: 2023-02-01

## 2023-02-01 DIAGNOSIS — Z46.6 ENCOUNTER FOR ADJUSTMENT OF URETERAL STENT: ICD-10-CM

## 2023-02-01 DIAGNOSIS — N20.1 RIGHT URETERAL STONE: Primary | ICD-10-CM

## 2023-02-01 PROBLEM — D72.829 LEUKOCYTOSIS: Status: ACTIVE | Noted: 2023-02-01

## 2023-02-01 PROBLEM — I71.21 ASCENDING AORTIC ANEURYSM: Status: ACTIVE | Noted: 2023-02-01

## 2023-02-01 PROBLEM — N13.2 HYDRONEPHROSIS WITH URETERAL CALCULUS: Status: ACTIVE | Noted: 2023-02-01

## 2023-02-01 LAB
2HR DELTA HS TROPONIN: 0 NG/L
4HR DELTA HS TROPONIN: -2 NG/L
ANION GAP SERPL CALCULATED.3IONS-SCNC: 7 MMOL/L (ref 4–13)
ATRIAL RATE: 53 BPM
BASOPHILS # BLD AUTO: 0.05 THOUSANDS/ÂΜL (ref 0–0.1)
BASOPHILS NFR BLD AUTO: 1 % (ref 0–1)
BILIRUB UR QL STRIP: NEGATIVE
BUN SERPL-MCNC: 18 MG/DL (ref 5–25)
CALCIUM SERPL-MCNC: 8.8 MG/DL (ref 8.4–10.2)
CARDIAC TROPONIN I PNL SERPL HS: 7 NG/L
CARDIAC TROPONIN I PNL SERPL HS: 9 NG/L
CARDIAC TROPONIN I PNL SERPL HS: 9 NG/L
CHLORIDE SERPL-SCNC: 106 MMOL/L (ref 96–108)
CLARITY UR: CLEAR
CO2 SERPL-SCNC: 26 MMOL/L (ref 21–32)
COLOR UR: NORMAL
CREAT SERPL-MCNC: 0.62 MG/DL (ref 0.6–1.3)
EOSINOPHIL # BLD AUTO: 0.31 THOUSAND/ÂΜL (ref 0–0.61)
EOSINOPHIL NFR BLD AUTO: 4 % (ref 0–6)
ERYTHROCYTE [DISTWIDTH] IN BLOOD BY AUTOMATED COUNT: 13.3 % (ref 11.6–15.1)
GFR SERPL CREATININE-BSD FRML MDRD: 89 ML/MIN/1.73SQ M
GLUCOSE SERPL-MCNC: 97 MG/DL (ref 65–140)
GLUCOSE UR STRIP-MCNC: NEGATIVE MG/DL
HCT VFR BLD AUTO: 36.8 % (ref 36.5–49.3)
HGB BLD-MCNC: 12.6 G/DL (ref 12–17)
HGB UR QL STRIP.AUTO: NEGATIVE
IMM GRANULOCYTES # BLD AUTO: 0.04 THOUSAND/UL (ref 0–0.2)
IMM GRANULOCYTES NFR BLD AUTO: 1 % (ref 0–2)
KETONES UR STRIP-MCNC: NEGATIVE MG/DL
LEUKOCYTE ESTERASE UR QL STRIP: NEGATIVE
LYMPHOCYTES # BLD AUTO: 1.76 THOUSANDS/ÂΜL (ref 0.6–4.47)
LYMPHOCYTES NFR BLD AUTO: 21 % (ref 14–44)
MAGNESIUM SERPL-MCNC: 1.8 MG/DL (ref 1.9–2.7)
MCH RBC QN AUTO: 34.1 PG (ref 26.8–34.3)
MCHC RBC AUTO-ENTMCNC: 34.2 G/DL (ref 31.4–37.4)
MCV RBC AUTO: 100 FL (ref 82–98)
MONOCYTES # BLD AUTO: 0.83 THOUSAND/ÂΜL (ref 0.17–1.22)
MONOCYTES NFR BLD AUTO: 10 % (ref 4–12)
NEUTROPHILS # BLD AUTO: 5.39 THOUSANDS/ÂΜL (ref 1.85–7.62)
NEUTS SEG NFR BLD AUTO: 63 % (ref 43–75)
NITRITE UR QL STRIP: NEGATIVE
NRBC BLD AUTO-RTO: 0 /100 WBCS
P AXIS: 80 DEGREES
PH UR STRIP.AUTO: 6.5 [PH]
PLATELET # BLD AUTO: 234 THOUSANDS/UL (ref 149–390)
PLATELET # BLD AUTO: 234 THOUSANDS/UL (ref 149–390)
PMV BLD AUTO: 11.4 FL (ref 8.9–12.7)
PMV BLD AUTO: 11.4 FL (ref 8.9–12.7)
POTASSIUM SERPL-SCNC: 3.1 MMOL/L (ref 3.5–5.3)
PR INTERVAL: 480 MS
PROT UR STRIP-MCNC: NEGATIVE MG/DL
QRS AXIS: -77 DEGREES
QRSD INTERVAL: 162 MS
QT INTERVAL: 516 MS
QTC INTERVAL: 484 MS
RBC # BLD AUTO: 3.7 MILLION/UL (ref 3.88–5.62)
SODIUM SERPL-SCNC: 139 MMOL/L (ref 135–147)
SP GR UR STRIP.AUTO: 1.02 (ref 1–1.03)
T WAVE AXIS: 97 DEGREES
UROBILINOGEN UR STRIP-ACNC: <2 MG/DL
VENTRICULAR RATE: 53 BPM
WBC # BLD AUTO: 8.38 THOUSAND/UL (ref 4.31–10.16)

## 2023-02-01 PROCEDURE — BT1D1ZZ FLUOROSCOPY OF RIGHT KIDNEY, URETER AND BLADDER USING LOW OSMOLAR CONTRAST: ICD-10-PCS | Performed by: UROLOGY

## 2023-02-01 PROCEDURE — 0T768DZ DILATION OF RIGHT URETER WITH INTRALUMINAL DEVICE, VIA NATURAL OR ARTIFICIAL OPENING ENDOSCOPIC: ICD-10-PCS | Performed by: UROLOGY

## 2023-02-01 DEVICE — INLAY OPTIMA URETERAL STENT W/O GUIDEWIRE
Type: IMPLANTABLE DEVICE | Site: URETER | Status: FUNCTIONAL
Brand: BARD® INLAY OPTIMA® URETERAL STENT

## 2023-02-01 RX ORDER — FENTANYL CITRATE/PF 50 MCG/ML
25 SYRINGE (ML) INJECTION
Status: DISCONTINUED | OUTPATIENT
Start: 2023-02-01 | End: 2023-02-01 | Stop reason: HOSPADM

## 2023-02-01 RX ORDER — SODIUM CHLORIDE, SODIUM LACTATE, POTASSIUM CHLORIDE, CALCIUM CHLORIDE 600; 310; 30; 20 MG/100ML; MG/100ML; MG/100ML; MG/100ML
75 INJECTION, SOLUTION INTRAVENOUS ONCE
Status: COMPLETED | OUTPATIENT
Start: 2023-02-01 | End: 2023-02-01

## 2023-02-01 RX ORDER — CEFAZOLIN SODIUM 2 G/50ML
2000 SOLUTION INTRAVENOUS
Status: DISCONTINUED | OUTPATIENT
Start: 2023-02-02 | End: 2023-02-01 | Stop reason: HOSPADM

## 2023-02-01 RX ORDER — PROPOFOL 10 MG/ML
INJECTION, EMULSION INTRAVENOUS AS NEEDED
Status: DISCONTINUED | OUTPATIENT
Start: 2023-02-01 | End: 2023-02-01

## 2023-02-01 RX ORDER — MAGNESIUM HYDROXIDE 1200 MG/15ML
LIQUID ORAL AS NEEDED
Status: DISCONTINUED | OUTPATIENT
Start: 2023-02-01 | End: 2023-02-01 | Stop reason: HOSPADM

## 2023-02-01 RX ORDER — SODIUM CHLORIDE, SODIUM LACTATE, POTASSIUM CHLORIDE, CALCIUM CHLORIDE 600; 310; 30; 20 MG/100ML; MG/100ML; MG/100ML; MG/100ML
20 INJECTION, SOLUTION INTRAVENOUS CONTINUOUS
Status: DISCONTINUED | OUTPATIENT
Start: 2023-02-01 | End: 2023-02-01

## 2023-02-01 RX ORDER — LIDOCAINE HYDROCHLORIDE 10 MG/ML
INJECTION, SOLUTION EPIDURAL; INFILTRATION; INTRACAUDAL; PERINEURAL AS NEEDED
Status: DISCONTINUED | OUTPATIENT
Start: 2023-02-01 | End: 2023-02-01

## 2023-02-01 RX ORDER — DEXAMETHASONE SODIUM PHOSPHATE 10 MG/ML
INJECTION, SOLUTION INTRAMUSCULAR; INTRAVENOUS AS NEEDED
Status: DISCONTINUED | OUTPATIENT
Start: 2023-02-01 | End: 2023-02-01

## 2023-02-01 RX ORDER — ACETAMINOPHEN 325 MG/1
650 TABLET ORAL EVERY 6 HOURS PRN
Status: DISCONTINUED | OUTPATIENT
Start: 2023-02-01 | End: 2023-02-02 | Stop reason: HOSPADM

## 2023-02-01 RX ORDER — AMLODIPINE BESYLATE 5 MG/1
5 TABLET ORAL 2 TIMES DAILY
Status: DISCONTINUED | OUTPATIENT
Start: 2023-02-01 | End: 2023-02-02 | Stop reason: HOSPADM

## 2023-02-01 RX ORDER — ONDANSETRON 2 MG/ML
INJECTION INTRAMUSCULAR; INTRAVENOUS AS NEEDED
Status: DISCONTINUED | OUTPATIENT
Start: 2023-02-01 | End: 2023-02-01

## 2023-02-01 RX ORDER — HEPARIN SODIUM 5000 [USP'U]/ML
5000 INJECTION, SOLUTION INTRAVENOUS; SUBCUTANEOUS EVERY 8 HOURS SCHEDULED
Status: DISCONTINUED | OUTPATIENT
Start: 2023-02-01 | End: 2023-02-02 | Stop reason: HOSPADM

## 2023-02-01 RX ORDER — CEFAZOLIN SODIUM 2 G/50ML
SOLUTION INTRAVENOUS AS NEEDED
Status: DISCONTINUED | OUTPATIENT
Start: 2023-02-01 | End: 2023-02-01

## 2023-02-01 RX ORDER — SODIUM CHLORIDE, SODIUM LACTATE, POTASSIUM CHLORIDE, CALCIUM CHLORIDE 600; 310; 30; 20 MG/100ML; MG/100ML; MG/100ML; MG/100ML
125 INJECTION, SOLUTION INTRAVENOUS CONTINUOUS
Status: DISCONTINUED | OUTPATIENT
Start: 2023-02-01 | End: 2023-02-01

## 2023-02-01 RX ORDER — METOCLOPRAMIDE HYDROCHLORIDE 5 MG/ML
5 INJECTION INTRAMUSCULAR; INTRAVENOUS ONCE AS NEEDED
Status: DISCONTINUED | OUTPATIENT
Start: 2023-02-01 | End: 2023-02-01 | Stop reason: HOSPADM

## 2023-02-01 RX ORDER — FENTANYL CITRATE 50 UG/ML
INJECTION, SOLUTION INTRAMUSCULAR; INTRAVENOUS AS NEEDED
Status: DISCONTINUED | OUTPATIENT
Start: 2023-02-01 | End: 2023-02-01

## 2023-02-01 RX ORDER — ACETAMINOPHEN 325 MG/1
975 TABLET ORAL ONCE
OUTPATIENT
Start: 2023-02-01 | End: 2023-02-01

## 2023-02-01 RX ORDER — SODIUM CHLORIDE 9 MG/ML
75 INJECTION, SOLUTION INTRAVENOUS CONTINUOUS
Status: DISCONTINUED | OUTPATIENT
Start: 2023-02-01 | End: 2023-02-01

## 2023-02-01 RX ORDER — ONDANSETRON 2 MG/ML
4 INJECTION INTRAMUSCULAR; INTRAVENOUS ONCE AS NEEDED
Status: DISCONTINUED | OUTPATIENT
Start: 2023-02-01 | End: 2023-02-01 | Stop reason: HOSPADM

## 2023-02-01 RX ORDER — SODIUM CHLORIDE 9 MG/ML
50 INJECTION, SOLUTION INTRAVENOUS ONCE
Status: COMPLETED | OUTPATIENT
Start: 2023-02-01 | End: 2023-02-01

## 2023-02-01 RX ADMIN — SODIUM CHLORIDE, SODIUM LACTATE, POTASSIUM CHLORIDE, AND CALCIUM CHLORIDE 75 ML/HR: .6; .31; .03; .02 INJECTION, SOLUTION INTRAVENOUS at 19:10

## 2023-02-01 RX ADMIN — AMLODIPINE BESYLATE 5 MG: 5 TABLET ORAL at 08:13

## 2023-02-01 RX ADMIN — LIDOCAINE HYDROCHLORIDE 50 MG: 10 INJECTION, SOLUTION EPIDURAL; INFILTRATION; INTRACAUDAL at 15:58

## 2023-02-01 RX ADMIN — SODIUM CHLORIDE, SODIUM LACTATE, POTASSIUM CHLORIDE, AND CALCIUM CHLORIDE: .6; .31; .03; .02 INJECTION, SOLUTION INTRAVENOUS at 15:19

## 2023-02-01 RX ADMIN — FENTANYL CITRATE 50 MCG: 50 INJECTION, SOLUTION INTRAMUSCULAR; INTRAVENOUS at 16:16

## 2023-02-01 RX ADMIN — PROPOFOL 200 MG: 10 INJECTION, EMULSION INTRAVENOUS at 15:57

## 2023-02-01 RX ADMIN — SODIUM CHLORIDE 50 ML/HR: 0.9 INJECTION, SOLUTION INTRAVENOUS at 08:16

## 2023-02-01 RX ADMIN — METOPROLOL TARTRATE 12.5 MG: 25 TABLET, FILM COATED ORAL at 05:19

## 2023-02-01 RX ADMIN — HEPARIN SODIUM 5000 UNITS: 5000 INJECTION INTRAVENOUS; SUBCUTANEOUS at 21:40

## 2023-02-01 RX ADMIN — PROPOFOL 100 MG: 10 INJECTION, EMULSION INTRAVENOUS at 15:58

## 2023-02-01 RX ADMIN — METOPROLOL TARTRATE 12.5 MG: 25 TABLET, FILM COATED ORAL at 19:07

## 2023-02-01 RX ADMIN — AMLODIPINE BESYLATE 5 MG: 5 TABLET ORAL at 19:07

## 2023-02-01 RX ADMIN — DEXAMETHASONE SODIUM PHOSPHATE 10 MG: 10 INJECTION, SOLUTION INTRAMUSCULAR; INTRAVENOUS at 16:05

## 2023-02-01 RX ADMIN — HEPARIN SODIUM 5000 UNITS: 5000 INJECTION INTRAVENOUS; SUBCUTANEOUS at 05:19

## 2023-02-01 RX ADMIN — ONDANSETRON 4 MG: 2 INJECTION INTRAMUSCULAR; INTRAVENOUS at 16:05

## 2023-02-01 RX ADMIN — CEFAZOLIN SODIUM 2000 MG: 2 SOLUTION INTRAVENOUS at 15:59

## 2023-02-01 RX ADMIN — IOHEXOL 100 ML: 350 INJECTION, SOLUTION INTRAVENOUS at 00:29

## 2023-02-01 NOTE — ASSESSMENT & PLAN NOTE
· BP acceptable  · Continue amlodipine and metoprolol  · Patient bradycardic since presentation, hold metoprolol for HR < 55  · Resume HCTZ on discharge

## 2023-02-01 NOTE — DISCHARGE INSTR - AVS FIRST PAGE
Ileana Hallman,    Today you had cystoscopy with placement of a ureteral stent, a small plastic tube to help to drain the right kidney  I will arrange for a subsequent ureteroscopic stone intervention at an interval as an outpatient  You may have some urgency and frequency of urination from your stent being in place  Typically Tylenol is all you will need for pain control  If you have questions or concerns as you recover, do let us know  Please be on the look out for a telephone call from our surgical scheduling staff with regard to treatment of your ureteral stone in the future      I wish you a rapid recovery,  Dr Cecily Stanley

## 2023-02-01 NOTE — H&P (VIEW-ONLY)
Consult - Urology   Silvestre Vaughn 1935, 80 y o  male MRN: [id number]    Unit/Bed#: ED-40 Encounter: [id number]        Assessment & Plan:  1  Right ureteral stone  2  Right mild hydronephrosis  - CT scan showing 7 mm proximal right ureteral calculus just distal to the right UPJ with mild hydronephrosis  - WBC on admission 11 87, improved to 8 38 today  - Creatinine 0 62, stable  -UA not showing infection  - Vital signs stable, afebrile  -Patient reports he has not had pain since 7 PM last night  He has not noticed passage of stone  Mild abdominal discomfort in right lower quadrant on palpation    -Keep patient n p o   -IV fluids  -Strain all urine  -Patient concerned about discharge from hospital as he lives at home, and has difficulty walking around, and does not have a car or means of transportation  Recommend case management help with discharge planning   -Patient added on for cystoscopy, ureteroscopy, retrograde pyelogram, holmium laser lithotripsy, insertion of right ureteral stent  Patient understands if there is concern for infection, a stent will be placed and he would need definitive ureteroscopy at an interval   -Consent signed    Subjective:   Patient is an 70-year-old male with past medical history hypertension, BPH, aortic aneurysm who presented to the ED with right lower quadrant pain  He reports this pain started around 2 PM yesterday in his right lower quadrant, and the pain progressed  He reports he presented to the ED started to improve  He denies any dysuria,hematuria, frequency or urgency  Since 7 PM last night patient reports he has not had this right-sided pain  He reports a history of kidney stones about 15 years ago  He denies any kidney stones requiring surgical intervention  He denies any shortness of breath, fever, chills, nausea, vomiting  Review of Systems   Constitutional: Negative for chills, diaphoresis and fever     Respiratory: Negative for cough and shortness of breath  Gastrointestinal: Negative for abdominal pain and vomiting  Genitourinary: Negative for difficulty urinating, dysuria and hematuria  Musculoskeletal: Negative for arthralgias and back pain  Skin: Negative for color change and rash  Neurological: Negative for seizures and syncope  All other systems reviewed and are negative  Objective:  Vitals: Blood pressure 135/67, pulse (!) 50, temperature 97 8 °F (36 6 °C), temperature source Oral, resp  rate 18, SpO2 98 %  ,There is no height or weight on file to calculate BMI  Physical Exam  Constitutional:       General: He is not in acute distress  Appearance: Normal appearance  He is normal weight  He is not ill-appearing or toxic-appearing  HENT:      Head: Normocephalic and atraumatic  Right Ear: External ear normal       Left Ear: External ear normal       Nose: Nose normal       Mouth/Throat:      Mouth: Mucous membranes are moist    Eyes:      General: No scleral icterus  Conjunctiva/sclera: Conjunctivae normal    Cardiovascular:      Rate and Rhythm: Normal rate and regular rhythm  Pulses: Normal pulses  Heart sounds: Normal heart sounds  No murmur heard  No friction rub  No gallop  Pulmonary:      Effort: Pulmonary effort is normal  No respiratory distress  Breath sounds: Normal breath sounds  No stridor  No wheezing, rhonchi or rales  Abdominal:      General: Abdomen is flat  There is no distension  Palpations: Abdomen is soft  Tenderness: There is abdominal tenderness  There is no right CVA tenderness, left CVA tenderness or guarding  Comments: Mild right lower quadrant tenderness with palpation   Musculoskeletal:         General: Normal range of motion  Cervical back: Normal range of motion  Skin:     General: Skin is warm  Findings: No rash  Neurological:      General: No focal deficit present  Mental Status: He is alert and oriented to person, place, and time  Mental status is at baseline  Psychiatric:         Mood and Affect: Mood normal          Behavior: Behavior normal          Thought Content: Thought content normal          Judgment: Judgment normal          Imaging:    CT ABDOMEN AND PELVIS WITH IV CONTRAST     INDICATION:   RLQ abdominal pain (Age >= 14y)  RLQ abdominal pain, r/o appendicitis      COMPARISON:  10/10/2021     TECHNIQUE:  CT examination of the abdomen and pelvis was performed  Axial, sagittal, and coronal 2D reformatted images were created from the source data and submitted for interpretation      Radiation dose length product (DLP) for this visit:  573 mGy-cm   This examination, like all CT scans performed in the [facility], was performed utilizing techniques to minimize radiation dose exposure, including the use of iterative   reconstruction and automated exposure control      IV Contrast:  100 mL of iohexol (OMNIPAQUE)  Enteric Contrast:  Enteric contrast was not administered      FINDINGS:     ABDOMEN     LOWER CHEST:  No clinically significant abnormality identified in the visualized lower chest      LIVER/BILIARY TREE:  Unremarkable      GALLBLADDER:  Gallbladder is surgically absent      SPLEEN:  Unremarkable      PANCREAS:  Unremarkable      ADRENAL GLANDS:  Unremarkable      KIDNEYS/URETERS:  7 mm proximal right ureteral calculus is seen just distal to the right UPJ  Mild right hydronephrosis is present  Right urothelial enhancement is noted potentially reflecting upper tract infection  Bilateral 1 to 2 mm nonobstructing   renal calculi are present  Bilateral renal cysts are noted      STOMACH AND BOWEL:  Unremarkable      APPENDIX:  No findings to suggest appendicitis      ABDOMINOPELVIC CAVITY:  No ascites  No pneumoperitoneum    No lymphadenopathy      VESSELS:  Unremarkable for patient's age      PELVIS     REPRODUCTIVE ORGANS:  Unremarkable for patient's age      URINARY BLADDER:  Unremarkable      ABDOMINAL WALL/INGUINAL REGIONS:  Unremarkable      OSSEOUS STRUCTURES:  No acute fracture or destructive osseous lesion  Right total hip arthroplasty is present      IMPRESSION:     7 mm proximal right ureteral calculus just distal to the right UPJ with mild right hydronephrosis  Associated urothelial enhancement suggesting potential upper tract infection      Punctate bilateral nonobstructing renal calculi            Workstation performed:     Labs:  Recent Labs     01/31/23 2019 02/01/23  0518   WBC 11 87* 8 38     Recent Labs     01/31/23 2019 02/01/23  0518   HGB 14 2 12 6     Recent Labs     01/31/23 2019 02/01/23  0518   CREATININE 0 69 0 62         History:  Social History     Socioeconomic History   • Marital status:       Spouse name: None   • Number of children: None   • Years of education: None   • Highest education level: None   Occupational History   • Occupation: Retired   Tobacco Use   • Smoking status: Never   • Smokeless tobacco: Never   Vaping Use   • Vaping Use: Never used   Substance and Sexual Activity   • Alcohol use: Not Currently     Comment: occasionally; Per [employer]  No Alcohol use   • Drug use: No   • Sexual activity: Not Currently   Other Topics Concern   • None   Social History Narrative   • None     Social Determinants of Health     Financial Resource Strain: Not on file   Food Insecurity: Not on file   Transportation Needs: Not on file   Physical Activity: Not on file   Stress: Not on file   Social Connections: Not on file   Intimate Partner Violence: Not on file   Housing Stability: Not on file     Past Medical History:   Diagnosis Date   • Balanitis 1/2/2020   • Basal cell carcinoma    • Bradycardia    • Degenerative joint disease (DJD) of lumbar spine    • Hypertension    • Kidney stones    • Pacemaker      Past Surgical History:   Procedure Laterality Date   • CARDIAC PACEMAKER PLACEMENT     • CHOLECYSTECTOMY     • EYE SURGERY     • GA HEMIARTHROPLASTY HIP PARTIAL Right 10/10/2021    Procedure: HEMIARTHROPLASTY HIP (BIPOLAR), RIGHT;  Surgeon: Aliya Peoples MD;  Location: [facility];  Service: Orthopedics   • TONSILLECTOMY       Family History   Problem Relation Age of Onset   • Diabetes Mother    • Heart disease Mother        [place], [place]  Date: 2/1/2023 Time: 10:09 AM

## 2023-02-01 NOTE — ANESTHESIA PREPROCEDURE EVALUATION
Procedure:  CYSTOSCOPY RETROGRADE PYELOGRAM WITH INSERTION STENT URETERAL (Right: Bladder)    Relevant Problems   CARDIO   (+) Ascending aortic aneurysm   (+) Chest pain   (+) Hypertension   (+) SVT (supraventricular tachycardia) (HCC)      /RENAL   (+) BPH (benign prostatic hyperplasia)   (+) Hydronephrosis with ureteral calculus      NEURO/PSYCH   (+) Dysthymic disorder   (+) Obsessive compulsive disorder        Physical Exam    Airway    Mallampati score: II  TM Distance: >3 FB  Neck ROM: full     Dental   No notable dental hx     Cardiovascular  Cardiovascular exam normal    Pulmonary  Pulmonary exam normal     Other Findings    Dual chamber pacemaker  Nl fns 12/16/22    Anesthesia Plan  ASA Score- 3     Anesthesia Type- general with ASA Monitors  Additional Monitors:   Airway Plan: LMA  Plan Factors-Exercise tolerance (METS): >4 METS  Chart reviewed  EKG reviewed  Imaging results reviewed  Existing labs reviewed  Patient summary reviewed  Patient is not a current smoker  Patient not instructed to abstain from smoking on day of procedure  Patient did not smoke on day of surgery  Induction-     Postoperative Plan-     Informed Consent- Anesthetic plan and risks discussed with patient  I personally reviewed this patient with the CRNA  Discussed and agreed on the Anesthesia Plan with the CRNA             EKG 2/1/23  Paced rhythm    8/20/21  NL echo

## 2023-02-01 NOTE — CONSULTS
Consult - Urology   Gilmar Mann 1935, 80 y o  male MRN: [id number]    Unit/Bed#: ED-40 Encounter: [id number]        Assessment & Plan:  1  Right ureteral stone  2  Right mild hydronephrosis  - CT scan showing 7 mm proximal right ureteral calculus just distal to the right UPJ with mild hydronephrosis  - WBC on admission 11 87, improved to 8 38 today  - Creatinine 0 62, stable  -UA not showing infection  - Vital signs stable, afebrile  -Patient reports he has not had pain since 7 PM last night  He has not noticed passage of stone  Mild abdominal discomfort in right lower quadrant on palpation    -Keep patient n p o   -IV fluids  -Strain all urine  -Patient concerned about discharge from hospital as he lives at home, and has difficulty walking around, and does not have a car or means of transportation  Recommend case management help with discharge planning   -Patient added on for cystoscopy, ureteroscopy, retrograde pyelogram, holmium laser lithotripsy, insertion of right ureteral stent  Patient understands if there is concern for infection, a stent will be placed and he would need definitive ureteroscopy at an interval   -Consent signed    Subjective:   Patient is an 61-year-old male with past medical history hypertension, BPH, aortic aneurysm who presented to the ED with right lower quadrant pain  He reports this pain started around 2 PM yesterday in his right lower quadrant, and the pain progressed  He reports he presented to the ED started to improve  He denies any dysuria,hematuria, frequency or urgency  Since 7 PM last night patient reports he has not had this right-sided pain  He reports a history of kidney stones about 15 years ago  He denies any kidney stones requiring surgical intervention  He denies any shortness of breath, fever, chills, nausea, vomiting  Review of Systems   Constitutional: Negative for chills, diaphoresis and fever     Respiratory: Negative for cough and shortness of breath  Gastrointestinal: Negative for abdominal pain and vomiting  Genitourinary: Negative for difficulty urinating, dysuria and hematuria  Musculoskeletal: Negative for arthralgias and back pain  Skin: Negative for color change and rash  Neurological: Negative for seizures and syncope  All other systems reviewed and are negative  Objective:  Vitals: Blood pressure 135/67, pulse (!) 50, temperature 97 8 °F (36 6 °C), temperature source Oral, resp  rate 18, SpO2 98 %  ,There is no height or weight on file to calculate BMI  Physical Exam  Constitutional:       General: He is not in acute distress  Appearance: Normal appearance  He is normal weight  He is not ill-appearing or toxic-appearing  HENT:      Head: Normocephalic and atraumatic  Right Ear: External ear normal       Left Ear: External ear normal       Nose: Nose normal       Mouth/Throat:      Mouth: Mucous membranes are moist    Eyes:      General: No scleral icterus  Conjunctiva/sclera: Conjunctivae normal    Cardiovascular:      Rate and Rhythm: Normal rate and regular rhythm  Pulses: Normal pulses  Heart sounds: Normal heart sounds  No murmur heard  No friction rub  No gallop  Pulmonary:      Effort: Pulmonary effort is normal  No respiratory distress  Breath sounds: Normal breath sounds  No stridor  No wheezing, rhonchi or rales  Abdominal:      General: Abdomen is flat  There is no distension  Palpations: Abdomen is soft  Tenderness: There is abdominal tenderness  There is no right CVA tenderness, left CVA tenderness or guarding  Comments: Mild right lower quadrant tenderness with palpation   Musculoskeletal:         General: Normal range of motion  Cervical back: Normal range of motion  Skin:     General: Skin is warm  Findings: No rash  Neurological:      General: No focal deficit present  Mental Status: He is alert and oriented to person, place, and time  Mental status is at baseline  Psychiatric:         Mood and Affect: Mood normal          Behavior: Behavior normal          Thought Content: Thought content normal          Judgment: Judgment normal          Imaging:    CT ABDOMEN AND PELVIS WITH IV CONTRAST     INDICATION:   RLQ abdominal pain (Age >= 14y)  RLQ abdominal pain, r/o appendicitis      COMPARISON:  10/10/2021     TECHNIQUE:  CT examination of the abdomen and pelvis was performed  Axial, sagittal, and coronal 2D reformatted images were created from the source data and submitted for interpretation      Radiation dose length product (DLP) for this visit:  573 mGy-cm   This examination, like all CT scans performed in the [facility], was performed utilizing techniques to minimize radiation dose exposure, including the use of iterative   reconstruction and automated exposure control      IV Contrast:  100 mL of iohexol (OMNIPAQUE)  Enteric Contrast:  Enteric contrast was not administered      FINDINGS:     ABDOMEN     LOWER CHEST:  No clinically significant abnormality identified in the visualized lower chest      LIVER/BILIARY TREE:  Unremarkable      GALLBLADDER:  Gallbladder is surgically absent      SPLEEN:  Unremarkable      PANCREAS:  Unremarkable      ADRENAL GLANDS:  Unremarkable      KIDNEYS/URETERS:  7 mm proximal right ureteral calculus is seen just distal to the right UPJ  Mild right hydronephrosis is present  Right urothelial enhancement is noted potentially reflecting upper tract infection  Bilateral 1 to 2 mm nonobstructing   renal calculi are present  Bilateral renal cysts are noted      STOMACH AND BOWEL:  Unremarkable      APPENDIX:  No findings to suggest appendicitis      ABDOMINOPELVIC CAVITY:  No ascites  No pneumoperitoneum    No lymphadenopathy      VESSELS:  Unremarkable for patient's age      PELVIS     REPRODUCTIVE ORGANS:  Unremarkable for patient's age      URINARY BLADDER:  Unremarkable      ABDOMINAL WALL/INGUINAL REGIONS:  Unremarkable      OSSEOUS STRUCTURES:  No acute fracture or destructive osseous lesion  Right total hip arthroplasty is present      IMPRESSION:     7 mm proximal right ureteral calculus just distal to the right UPJ with mild right hydronephrosis  Associated urothelial enhancement suggesting potential upper tract infection      Punctate bilateral nonobstructing renal calculi            Workstation performed:     Labs:  Recent Labs     01/31/23 2019 02/01/23  0518   WBC 11 87* 8 38     Recent Labs     01/31/23 2019 02/01/23  0518   HGB 14 2 12 6     Recent Labs     01/31/23 2019 02/01/23  0518   CREATININE 0 69 0 62         History:  Social History     Socioeconomic History   • Marital status:       Spouse name: None   • Number of children: None   • Years of education: None   • Highest education level: None   Occupational History   • Occupation: Retired   Tobacco Use   • Smoking status: Never   • Smokeless tobacco: Never   Vaping Use   • Vaping Use: Never used   Substance and Sexual Activity   • Alcohol use: Not Currently     Comment: occasionally; Per [employer]  No Alcohol use   • Drug use: No   • Sexual activity: Not Currently   Other Topics Concern   • None   Social History Narrative   • None     Social Determinants of Health     Financial Resource Strain: Not on file   Food Insecurity: Not on file   Transportation Needs: Not on file   Physical Activity: Not on file   Stress: Not on file   Social Connections: Not on file   Intimate Partner Violence: Not on file   Housing Stability: Not on file     Past Medical History:   Diagnosis Date   • Balanitis 1/2/2020   • Basal cell carcinoma    • Bradycardia    • Degenerative joint disease (DJD) of lumbar spine    • Hypertension    • Kidney stones    • Pacemaker      Past Surgical History:   Procedure Laterality Date   • CARDIAC PACEMAKER PLACEMENT     • CHOLECYSTECTOMY     • EYE SURGERY     • CO HEMIARTHROPLASTY HIP PARTIAL Right 10/10/2021    Procedure: HEMIARTHROPLASTY HIP (BIPOLAR), RIGHT;  Surgeon: Jacobo Lambert MD;  Location: [facility];  Service: Orthopedics   • TONSILLECTOMY       Family History   Problem Relation Age of Onset   • Diabetes Mother    • Heart disease Mother        [place]  Date: 2/1/2023 Time: 10:09 AM

## 2023-02-01 NOTE — ASSESSMENT & PLAN NOTE
· Patient with history of kidney stones presented with RLQ abdominal pain  · CT A/P (preliminary report): "Mild right hydronephrosis due to a 1 0 x 0 8 cm stone in proximal right ureter  Urothelial enhancement of the R renal pelvis and proximal right ureter may indicate an upper R urinary tract infection  Mild bladder wall thickening may be related to underdistention or cystitis "   · Urology consulted  · Keep NPO  · IV fluids  · Pain control  · Strain all urine  · Start IV ceftriaxone pending UA given the above CT findings

## 2023-02-01 NOTE — ANESTHESIA POSTPROCEDURE EVALUATION
Post-Op Assessment Note    CV Status:  Stable    Pain management: adequate     Mental Status:  Alert and awake   Hydration Status:  Euvolemic   PONV Controlled:  Controlled   Airway Patency:  Patent      Post Op Vitals Reviewed: Yes      Staff: CRNA         No notable events documented      BP   142/70   Temp   97 3   Pulse 55   Resp   18   SpO2   99

## 2023-02-01 NOTE — ASSESSMENT & PLAN NOTE
· Reports left-sided chest pain/ discomfort for the past 2 hours  Admits to similar pain in the past    · No prior h/o CAD  H/o pacemaker placement for bradycardia and h/o aortic aneurysm  · Obtain EKG  · Monitor on telemetry  · Obtain troponin  · Mylanta prn  · Patient was prescribed SL nitro by his PCP but reports that he has never taken this before  If pain persists, consider nitro

## 2023-02-01 NOTE — ASSESSMENT & PLAN NOTE
· Reports left-sided chest pain/ discomfort for the past 2 hours  Admits to similar pain in the past    · No prior h/o CAD  H/o pacemaker placement for bradycardia and h/o aortic aneurysm  · EKG without any signs of ischemia but showed a paced rhythm  Serial cardiac enzymes were negative for acute coronary syndrome  No further inpatient cardiac work-up was recommended

## 2023-02-01 NOTE — ASSESSMENT & PLAN NOTE
· BP acceptable  · Continue amlodipine and metoprolol  · Patient bradycardic since presentation, hold metoprolol for HR < 55   · Hold HCTZ

## 2023-02-01 NOTE — TELEPHONE ENCOUNTER
S/p right ureteral stent 6 fr x 26 cm   Please arrange for URS with laser lithotripsy with the first available urologist  (urine culture sent today)

## 2023-02-01 NOTE — OP NOTE
OPERATIVE REPORT  PATIENT NAME: Nehemias Moody    :  1935  MRN: [id number]  Pt Location: [facility] ROOM 01    SURGERY DATE: 2023    Surgeon(s) and Role:     * Stephan Figueredo MD - Primary    Preop Diagnosis:  Nephrolithiasis [N20 0]    Post-Op Diagnosis Codes:     * Nephrolithiasis [N20 0]    Procedure(s):  Right - CYSTOSCOPY RETROGRADE PYELOGRAM WITH INSERTION STENT URETERAL    Specimen(s):  ID Type Source Tests Collected by Time Destination   A : urine culture Urine Urine, Cystoscopic URINE CULTURE Stephan Figueredo MD 2023 1618        Estimated Blood Loss:   Minimal    Drains:  Ureteral Internal Stent 6 Fr  (Active)   Number of days: 0       Anesthesia Type:   General    Operative Indications:  Nephrolithiasis [N20 0]  Leukocytosis  Ureteritis    Operative Findings:  Cloudy urine mixed with blood is noted to drain from the right collecting system upon wire and stent placement  A urine culture was sent today  Successful placement of a 6 [place] by 26 cm ureteral stent was performed  We will arrange for subsequent ureteroscopic stone intervention at an interval as a planned readmission to hospital    Complications:   None    Procedure and Technique:      PROCEDURES PERFORMED:  1) Cystoscopy  2) RIGHT retrograde pyelography with fluoroscopic interpretation  3) RIGHT ureteral stent placement (6F x 26cm)    SURGEON:  Stephan Figueredo MD    ASSISTANTS:  none    NOTE:  There were no qualified teaching residents to assist with this case    ANESTHESIA: General     COMPLICATIONS:   None    ANTIBIOTICS:  ancef    INTRAOPERATIVE THROMBOEMBOLISM PROPHYLAXIS:  Pneumatic compression stockings     RADIOLOGIC FINDINGS:    1  Retrograde pyelogram was performed on the RIGHT side using a 5 Fr open ended catheter         2  The following findings were noted: moderate hydronephrosis        INDICATIONS FOR PROCEDURE:  Micah Moon is an 80 y o  old male with a RIGHT ureteral stone, leukocytosis, uncontrolled pain, ureteritis, and right hydronephrosis  After discussing the options, the patient elected to undergo ureteral stent placement  We discussed the procedure in detail, the alternatives, and the risks, and they signed informed consent to proceed  PROCEDURE IN DETAIL:   The patient was identified and brought to the OR  Antibiotic prophylaxis and DVT prophylaxis were administered  They were placed in the comfortable dorsal lithotomy position with care to pad all pressure points  They were prepped and draped in the usual sterile fashion using hibiclens  A surgical time out was performed with all in the room in agreement with the correct patient, procedure, indications, and laterality  A 21-Thai rigid cystoscope was used to enter the bladder  The bladder was inspected in its entirety and there were no lesions noted  The ureteral orifices were identified in their orthotopicpositions  The RIGHT ureteral orifice was identified and a 5 Fr open ended catheter was placed into the ureteral orifice  The stone was not visible on  fluoroscopic guidance  A retrograde pyelogram was performed with injection of contrast which demonstrated moderate hydroureteronephrosis  A wire was up to the kidney under fluoroscopic guidance  A 6 fr x 26 cm right JJ stent was then passed up the wire  under fluoroscopic guidance into the RIGHT  kidney with a good curl noted in the kidney and in the bladder  The bladder was drained  The patient was placed back in the supine position, awakened from general anesthesia and brought to the recovery room in stable condition  SPECIMENS:   Order Name Source Comment Collection Info Order Time   URINE CULTURE Urine, Cystoscopic  Collected By: Kallie Temple MD 2/1/2023  4:20 PM     Release to patient through [employer]   Immediate             IMPLANTS:   Implant Name Type Inv   Item Serial No   Lot No  LRB No  Used Action   STENT URETERAL 6 FR 26CM INLAY OPTIMA - [id number]  STENT URETERAL 6 FR 26CM INLAY OPTIMA  [employer] [id number] Right 1 Implanted        COMPLICATIONS: none    DISPOSITION: PACU     PLAN: s/p ureteral stenting, right   Can follow up outpatient for a planned readmission for right ureteroscopy with laser lithotripsy and all indicated procedures     I was present for the entire procedure and A qualified resident physician was not available    Patient Disposition:  PACU         SIGNATURE: Annika Cavazos MD  DATE: February 1, 2023  TIME: 4:28 PM

## 2023-02-01 NOTE — ASSESSMENT & PLAN NOTE
· POA with WBC 11 87   · Does not meet sepsis criteria  · Antibiotics as per above     · Repeat CBC in AM

## 2023-02-01 NOTE — H&P
[address] 1935, 80 y o  male MRN: [id number]  Unit/Bed#: ED-40 Encounter: [id number]  Primary Care Provider: Doretha Howard MD   Date and time admitted to hospital: 1/31/2023  8:05 PM    * Hydronephrosis with ureteral calculus  Assessment & Plan  · Patient with history of kidney stones presented with RLQ abdominal pain  · CT A/P (preliminary report): "Mild right hydronephrosis due to a 1 0 x 0 8 cm stone in proximal right ureter  Urothelial enhancement of the R renal pelvis and proximal right ureter may indicate an upper R urinary tract infection  Mild bladder wall thickening may be related to underdistention or cystitis "   · Urology consulted  · Keep NPO  · IV fluids  · Pain control  · Strain all urine  · UA unremarkable for infection  Leukocytosis  Assessment & Plan  · POA with WBC 11 87   · Does not meet sepsis criteria  · Repeat CBC in AM      Chest pain  Assessment & Plan  · Reports left-sided chest pain/ discomfort for the past 2 hours  Admits to similar pain in the past    · No prior h/o CAD  H/o pacemaker placement for bradycardia and h/o aortic aneurysm  · Obtain EKG  · Monitor on telemetry  · Obtain troponin  · Mylanta prn  · Patient was prescribed SL nitro by his PCP but reports that he has never taken this before  If pain persists, consider nitro  Hypertension  Assessment & Plan  · BP acceptable  · Continue amlodipine and metoprolol  · Patient bradycardic since presentation, hold metoprolol for HR < 55   · Hold HCTZ  Ascending aortic aneurysm  Assessment & Plan  · Stable, 4 4 cm, on most imaging 10/2021  VTE Pharmacologic Prophylaxis: VTE Score: 4 Moderate Risk (Score 3-4) - Pharmacological DVT Prophylaxis Ordered: heparin  Code Status: level 1- full code  Discussion with family: not at this time       Anticipated Length of Stay: Patient will be admitted on an inpatient basis with an anticipated length of stay of greater than 2 midnights secondary to ureter stone, need for urology eval, likely stent placement  Total Time for Visit, including Counseling / Coordination of Care: 70 minutes Greater than 50% of this total time spent on direct patient counseling and coordination of care  Chief Complaint: RLQ abdominal pain    History of Present Illness:  Maria Del Rosario Cantor is a 80 y o  male with a PMH of HTN, BPH, aortic aneurysm and kidney stones who presents with RLQ abdominal pain  Patient reports that around 2pm yesterday he started having abdominal pain located in his RLQ  He states that he was concerned he may have appendicitis and therefore presented to the ED for evaluation  He notes that after urinating in the ED his pain seemed to improve  He denies dysuria or hematuria  He admits to some left-sided chest pain/ discomfort at this time, which he reports having for the past 2 hours and notes having similar chest pain in the past  He has attributed some of his prior episodes to indigestion  He denies SOB, recent cough, fevers/ chills, n/v/d  Review of Systems:  Review of Systems   Constitutional: Negative for chills and fever  Respiratory: Negative for cough and shortness of breath  Cardiovascular: Positive for chest pain  Gastrointestinal: Positive for abdominal pain (RLQ)  Negative for nausea and vomiting  Genitourinary: Positive for frequency  Negative for dysuria, flank pain and hematuria  All other systems reviewed and are negative        Past Medical and Surgical History:   Past Medical History:   Diagnosis Date   • Balanitis 1/2/2020   • Basal cell carcinoma    • Bradycardia    • Degenerative joint disease (DJD) of lumbar spine    • Hypertension    • Kidney stones    • Pacemaker        Past Surgical History:   Procedure Laterality Date   • CARDIAC PACEMAKER PLACEMENT     • CHOLECYSTECTOMY     • EYE SURGERY     • NV HEMIARTHROPLASTY HIP PARTIAL Right 10/10/2021    Procedure: HEMIARTHROPLASTY HIP (BIPOLAR), RIGHT;  Surgeon: Gonzalo Caputo MD;  Location: [facility];  Service: Orthopedics   • TONSILLECTOMY         Meds/Allergies:  Prior to Admission medications    Medication Sig Start Date End Date Taking?  Authorizing Provider   acetaminophen (TYLENOL) 325 mg tablet Take 3 tablets (975 mg total) by mouth every 8 (eight) hours  Patient not taking: No sig reported 10/14/21   Juanita Chol PA-C   amLODIPine (NORVASC) 5 mg tablet TAKE 1 TABLET TWICE DAILY 6/8/22   Artemio Gonzales MD   B Complex Vitamins (VITAMIN B COMPLEX PO) Take 1 tablet by mouth daily 7/3/14   Historical Provider, MD   bisacodyl (DULCOLAX) 5 mg EC tablet Take 1 tablet (5 mg total) by mouth daily as needed for constipation  Patient not taking: No sig reported 1/19/21   Joye Scheuermann, DO   Cholecalciferol (VITAMIN D PO) Take 1 capsule by mouth daily     Historical Provider, MD   enoxaparin (LOVENOX) 40 mg/0 4 mL Inject 0 4 mL (40 mg total) under the skin daily for 28 days  Patient not taking: Reported on 9/2/2022 10/14/21 11/11/21  Juanita Ramirez PA-C   gabapentin (NEURONTIN) 100 mg capsule Take 1 capsule (100 mg total) by mouth daily at bedtime  Patient not taking: No sig reported 10/14/21   Juanita Chol, PA-C   hydrochlorothiazide (MICROZIDE) 12 5 mg capsule TAKE 1 CAPSULE DAILY 1/4/23   Artemio Gonzales MD   metoprolol tartrate (LOPRESSOR) 25 mg tablet TAKE 1/2 TABLET EVERY 12 HOURS 3/9/22   Artemio Gonzales MD   Multiple Vitamins-Minerals (MULTIVITAMIN ADULT PO) Take 1 tablet by mouth daily 7/3/14   Historical Provider, MD   nitroglycerin (NITROSTAT) 0 4 mg SL tablet Place 1 tablet (0 4 mg total) under the tongue every 5 (five) minutes as needed for chest pain  Patient not taking: No sig reported 3/16/73   Lele Smith MD   polyethylene glycol (GLYCOLAX) 17 GM/SCOOP powder TAKE 17 G (1 CAPFUL) BY MOUTH DAILY AS NEEDED (CONSTIPATION) - MIX WITH LIQUID BEFORE TAKING  5/9/16   Lele Smith MD polyethylene glycol (MIRALAX) 17 g packet Take 17 g by mouth daily 10/15/21   Historical Provider, MD   potassium chloride (K-DUR,KLOR-CON) 20 mEq tablet TAKE 2 TABLETS (40 MEQ TOTAL) BY MOUTH DAILY AS DIRECTED 12/8/22   Jose David Beckwith MD   senna-docusate sodium (SENOKOT S) 8 6-50 mg per tablet Take 1 tablet by mouth 2 (two) times a day  Patient not taking: No sig reported 10/14/21   Alessandro Bashir PA-C   tamsulosin (FLOMAX) 0 4 mg TAKE 1 CAPSULE (0 4 MG TOTAL) BY MOUTH DAILY AT BEDTIME  Patient not taking: No sig reported 3/9/25   Mercy Alamo MD     I have reviewed home medications with a medical source (PCP, Pharmacy, other)  Allergies: No Known Allergies    Social History:  Marital Status:    Occupation:   Patient Pre-hospital Living Situation: Home, Alone  Patient Pre-hospital Level of Mobility: walks with walker  Patient Pre-hospital Diet Restrictions:  Substance Use History:   Social History     Substance and Sexual Activity   Alcohol Use Not Currently    Comment: occasionally; Per [employer]  No Alcohol use     Social History     Tobacco Use   Smoking Status Never   Smokeless Tobacco Never     Social History     Substance and Sexual Activity   Drug Use No       Family History:  Family History   Problem Relation Age of Onset   • Diabetes Mother    • Heart disease Mother        Physical Exam:     Vitals:   Blood Pressure: 129/64 (02/01/23 0500)  Pulse: 59 (02/01/23 0519)  Temperature: 97 8 °F (36 6 °C) (01/31/23 2012)  Temp Source: Oral (01/31/23 2012)  Respirations: 18 (02/01/23 0500)  SpO2: 95 % (02/01/23 0500)    Physical Exam  Vitals and nursing note reviewed  Constitutional:       General: He is not in acute distress  Appearance: He is not ill-appearing or diaphoretic  HENT:      Head: Normocephalic and atraumatic  Eyes:      Conjunctiva/sclera: Conjunctivae normal    Cardiovascular:      Rate and Rhythm: Normal rate and regular rhythm     Pulmonary:      Effort: Pulmonary effort is normal  No respiratory distress  Breath sounds: Normal breath sounds  Abdominal:      General: Bowel sounds are normal       Palpations: Abdomen is soft  Tenderness: There is no abdominal tenderness  Musculoskeletal:      Right lower leg: Edema (trace) present  Left lower leg: Edema (trace) present  Skin:     General: Skin is warm and dry  Coloration: Skin is not pale  Neurological:      Mental Status: He is alert and oriented to person, place, and time  Psychiatric:         Mood and Affect: Mood normal          Additional Data:     Lab Results:  Results from last 7 days   Lab Units 01/31/23 2019   WBC Thousand/uL 11 87*   HEMOGLOBIN g/dL 14 2   HEMATOCRIT % 41 4   PLATELETS Thousands/uL 240   NEUTROS PCT % 82*   LYMPHS PCT % 9*   MONOS PCT % 6   EOS PCT % 1     Results from last 7 days   Lab Units 01/31/23 2019   SODIUM mmol/L 138   POTASSIUM mmol/L 3 8   CHLORIDE mmol/L 105   CO2 mmol/L 25   BUN mg/dL 23   CREATININE mg/dL 0 69   ANION GAP mmol/L 8   CALCIUM mg/dL 9 1   ALBUMIN g/dL 3 8   TOTAL BILIRUBIN mg/dL 0 58   ALK PHOS U/L 55   ALT U/L 23   AST U/L 25   GLUCOSE RANDOM mg/dL 151*                       Lines/Drains:  Invasive Devices     Peripheral Intravenous Line  Duration           Peripheral IV 10/10/21 Right Antecubital 479 days    Peripheral IV 10/10/21 Left Hand 478 days    Peripheral IV 01/31/23 Right Antecubital <1 day                    Imaging: Reviewed radiology reports from this admission including: abdominal/pelvic CT  CT abdomen pelvis with contrast   ED Interpretation by [address] [place] [address] (02/01 0241)   From Vrad:     Impression:   1  Mild right hydronephrosis to a 1 0 x 0 8 cm stone in proximal right ureter  2  Urothelial enhancement of the R renal pelvis and proximal right ureter may indicate an upper R urinary tract infection   3  Mild bladder wall thickening may be related to underdistention or cystitis     4  At least 2 punctuate nonobstructive renal stones bilaterally  No left hydronephrosis  : Sharon Rowe MD          EKG and Other Studies Reviewed on Admission:   · EKG: No EKG obtained  ** Please Note: This note has been constructed using a voice recognition system   **

## 2023-02-01 NOTE — ASSESSMENT & PLAN NOTE
· Patient with history of kidney stones presented with RLQ abdominal pain  · CT A/P (preliminary report): "Mild right hydronephrosis due to a 1 0 x 0 8 cm stone in proximal right ureter  Urothelial enhancement of the R renal pelvis and proximal right ureter may indicate an upper R urinary tract infection  Mild bladder wall thickening may be related to underdistention or cystitis "   · Patient underwent cystoscopy with retrograde pyelogram and right distal ureteric stent placement  Pending staged urological procedure at a later date  Continue with outpatient urology follow-up

## 2023-02-01 NOTE — INTERVAL H&P NOTE
H&P reviewed  After examining the patient I find no changes in the patients condition since the H&P had been written      Vitals:    02/01/23 1430   BP: 132/69   Pulse: (!) 50   Resp: 16   Temp:    SpO2: 98%   Proceed to cystoscopy and right ureteral stent placement

## 2023-02-02 ENCOUNTER — TRANSITIONAL CARE MANAGEMENT (OUTPATIENT)
Dept: FAMILY MEDICINE CLINIC | Facility: CLINIC | Age: 88
End: 2023-02-02

## 2023-02-02 VITALS
BODY MASS INDEX: 26.96 KG/M2 | DIASTOLIC BLOOD PRESSURE: 66 MMHG | TEMPERATURE: 97.5 F | SYSTOLIC BLOOD PRESSURE: 141 MMHG | HEART RATE: 50 BPM | OXYGEN SATURATION: 96 % | WEIGHT: 204.37 LBS | RESPIRATION RATE: 17 BRPM

## 2023-02-02 DIAGNOSIS — I47.1 SVT (SUPRAVENTRICULAR TACHYCARDIA) (HCC): ICD-10-CM

## 2023-02-02 DIAGNOSIS — I10 ESSENTIAL (PRIMARY) HYPERTENSION: ICD-10-CM

## 2023-02-02 LAB
BASOPHILS # BLD AUTO: 0.01 THOUSANDS/ÂΜL (ref 0–0.1)
BASOPHILS NFR BLD AUTO: 0 % (ref 0–1)
EOSINOPHIL # BLD AUTO: 0 THOUSAND/ÂΜL (ref 0–0.61)
EOSINOPHIL NFR BLD AUTO: 0 % (ref 0–6)
ERYTHROCYTE [DISTWIDTH] IN BLOOD BY AUTOMATED COUNT: 13.1 % (ref 11.6–15.1)
HCT VFR BLD AUTO: 41.3 % (ref 36.5–49.3)
HGB BLD-MCNC: 14 G/DL (ref 12–17)
IMM GRANULOCYTES # BLD AUTO: 0.05 THOUSAND/UL (ref 0–0.2)
IMM GRANULOCYTES NFR BLD AUTO: 1 % (ref 0–2)
LYMPHOCYTES # BLD AUTO: 0.75 THOUSANDS/ÂΜL (ref 0.6–4.47)
LYMPHOCYTES NFR BLD AUTO: 9 % (ref 14–44)
MCH RBC QN AUTO: 33 PG (ref 26.8–34.3)
MCHC RBC AUTO-ENTMCNC: 33.9 G/DL (ref 31.4–37.4)
MCV RBC AUTO: 97 FL (ref 82–98)
MONOCYTES # BLD AUTO: 0.08 THOUSAND/ÂΜL (ref 0.17–1.22)
MONOCYTES NFR BLD AUTO: 1 % (ref 4–12)
NEUTROPHILS # BLD AUTO: 7.32 THOUSANDS/ÂΜL (ref 1.85–7.62)
NEUTS SEG NFR BLD AUTO: 89 % (ref 43–75)
NRBC BLD AUTO-RTO: 0 /100 WBCS
PLATELET # BLD AUTO: 258 THOUSANDS/UL (ref 149–390)
PMV BLD AUTO: 11.4 FL (ref 8.9–12.7)
RBC # BLD AUTO: 4.24 MILLION/UL (ref 3.88–5.62)
WBC # BLD AUTO: 8.21 THOUSAND/UL (ref 4.31–10.16)

## 2023-02-02 RX ORDER — HYDROCHLOROTHIAZIDE 12.5 MG/1
CAPSULE, GELATIN COATED ORAL
Qty: 90 CAPSULE | Refills: 3 | Status: SHIPPED | OUTPATIENT
Start: 2023-02-02

## 2023-02-02 RX ORDER — OXYBUTYNIN CHLORIDE 5 MG/1
5 TABLET ORAL 3 TIMES DAILY PRN
Status: DISCONTINUED | OUTPATIENT
Start: 2023-02-02 | End: 2023-02-02 | Stop reason: HOSPADM

## 2023-02-02 RX ORDER — OXYBUTYNIN CHLORIDE 5 MG/1
5 TABLET ORAL 3 TIMES DAILY PRN
Qty: 30 TABLET | Refills: 0 | Status: SHIPPED | OUTPATIENT
Start: 2023-02-02

## 2023-02-02 RX ORDER — POTASSIUM CHLORIDE 20 MEQ/1
40 TABLET, EXTENDED RELEASE ORAL DAILY
Qty: 180 TABLET | Refills: 3 | Status: SHIPPED | OUTPATIENT
Start: 2023-02-02

## 2023-02-02 RX ORDER — PHENAZOPYRIDINE HYDROCHLORIDE 100 MG/1
100 TABLET, FILM COATED ORAL
Status: DISCONTINUED | OUTPATIENT
Start: 2023-02-02 | End: 2023-02-02 | Stop reason: HOSPADM

## 2023-02-02 RX ADMIN — OXYBUTYNIN CHLORIDE 5 MG: 5 TABLET ORAL at 05:47

## 2023-02-02 RX ADMIN — HEPARIN SODIUM 5000 UNITS: 5000 INJECTION INTRAVENOUS; SUBCUTANEOUS at 05:48

## 2023-02-02 RX ADMIN — METOPROLOL TARTRATE 12.5 MG: 25 TABLET, FILM COATED ORAL at 05:48

## 2023-02-02 RX ADMIN — PHENAZOPYRIDINE 100 MG: 100 TABLET ORAL at 07:22

## 2023-02-02 RX ADMIN — PHENAZOPYRIDINE 100 MG: 100 TABLET ORAL at 12:26

## 2023-02-02 RX ADMIN — AMLODIPINE BESYLATE 5 MG: 5 TABLET ORAL at 08:59

## 2023-02-02 NOTE — PLAN OF CARE
Problem: MOBILITY - ADULT  Goal: Maintain or return to baseline ADL function  Description: INTERVENTIONS:  -  Assess patient's ability to carry out ADLs; assess patient's baseline for ADL function and identify physical deficits which impact ability to perform ADLs (bathing, care of mouth/teeth, toileting, grooming, dressing, etc )  - Assess/evaluate cause of self-care deficits   - Assess range of motion  - Assess patient's mobility; develop plan if impaired  - Assess patient's need for assistive devices and provide as appropriate  - Encourage maximum independence but intervene and supervise when necessary  - Involve family in performance of ADLs  - Assess for home care needs following discharge   - Consider OT consult to assist with ADL evaluation and planning for discharge  - Provide patient education as appropriate  2/1/2023 1938 by Cali Austin RN  Outcome: Progressing  2/1/2023 1938 by Cali Austin RN  Outcome: Progressing  Goal: Maintains/Returns to pre admission functional level  Description: INTERVENTIONS:  - Perform BMAT or MOVE assessment daily    - Set and communicate daily mobility goal to care team and patient/family/caregiver  - Collaborate with rehabilitation services on mobility goals if consulted  - Perform Range of Motion 4 times a day  - Reposition patient every 2 hours    - Dangle patient 3 times a day  - Stand patient 3 times a day  - Ambulate patient 3 times a day  - Out of bed to chair 3 times a day   - Out of bed for meals 3 times a day  - Out of bed for toileting  - Record patient progress and toleration of activity level   2/1/2023 1938 by Cali Austin RN  Outcome: Progressing  2/1/2023 1938 by Cali Austin RN  Outcome: Progressing     Problem: INFECTION - ADULT  Goal: Absence or prevention of progression during hospitalization  Description: INTERVENTIONS:  - Assess and monitor for signs and symptoms of infection  - Monitor lab/diagnostic results  - Monitor all insertion sites, i e  indwelling lines, tubes, and drains  - Monitor endotracheal if appropriate and nasal secretions for changes in amount and color  - [place] appropriate cooling/warming therapies per order  - Administer medications as ordered  - Instruct and encourage patient and family to use good hand hygiene technique  - Identify and instruct in appropriate isolation precautions for identified infection/condition  Outcome: Progressing     Problem: SAFETY ADULT  Goal: Patient will remain free of falls  Description: INTERVENTIONS:  - Educate patient/family on patient safety including physical limitations  - Instruct patient to call for assistance with activity   - Consult OT/PT to assist with strengthening/mobility   - Keep Call bell within reach  - Keep bed low and locked with side rails adjusted as appropriate  - Keep care items and personal belongings within reach  - Initiate and maintain comfort rounds  - Make Fall Risk Sign visible to staff  - Offer Toileting every 2 Hours, in advance of need  - Initiate/Maintain bed alarm  - Obtain necessary fall risk management equipment  - Apply yellow socks and bracelet for high fall risk patients  - Consider moving patient to room near nurses station  Outcome: Progressing

## 2023-02-02 NOTE — PHYSICAL THERAPY NOTE
PHYSICAL THERAPY EVALUATION  NAME:  Ramandeep Mata  DATE: 02/02/23    AGE:   80 y o  Mrn:   [id number]  Principal problem: Principal Problem:    Hydronephrosis with ureteral calculus  Active Problems:    Hypertension    Chest pain    Leukocytosis    Ascending aortic aneurysm      Vitals:    02/01/23 2217 02/02/23 0547 02/02/23 0715 02/02/23 0857   BP: 137/65 148/73 155/69 141/66   BP Location:       Pulse: (!) 53 69 56 (!) 50   Resp: 18  17    Temp: 97 7 °F (36 5 °C)  97 5 °F (36 4 °C)    TempSrc:       SpO2: 96% 96% 94% 96%   Weight:  92 7 kg (204 lb 5 9 oz)         Length Of Stay: 1  Performed at least 2 patient identifiers during session: Name and Birthday  PHYSICAL THERAPY EVALUATION :    02/02/23 0955   PT Last Visit   PT Visit Date 02/02/23   Note Type   Note type Evaluation   Pain Assessment   Pain Assessment Tool 0-10   Pain Score No Pain  (acute, but does have chronic low back pain)   Patient's Stated Pain Goal No pain   Restrictions/Precautions   Weight Bearing Precautions Per Order No   Other Precautions Bed Alarm; Chair Alarm;Cognitive; Fall Risk   Home Living   Type of [place] [address] to live on main level with bedroom/bathroom; Performs ADLs on one level; One level  (2-3 CATHY w/krishna iron railing)   Home Equipment Wheelchair-manual;Walker;Cane;Stair glide  (drive brand WC, also has rolling and rollator walker as well as neuro upright walker; stair glide to basement 1x/month)   Additional Comments uses walker to amb for short distances from [address] into bathroom < 25-30'   Prior Function   Level of [place] Independent with ADLs; Independent with functional mobility   Lives With Alone   Receives Help From Personal care attendant  (caretaker 4 hrs mon and fri (but has a cold) who assists him and drives)   Falls in the last 6 months 1 to 4   Vocational Retired   [employer]   Additional Pertinent History Right - [address] [facility]   Family/Caregiver Present No   Cognition   Overall Cognitive Status WFL   Arousal/Participation Alert   Orientation Level Oriented to person   Following Commands Follows one step commands with increased time or repetition  (distracted, verbose)   Subjective   Subjective Pt pleasant and cooperative, reports that he feels close to his mobility baseline  During assessment pt recevied phone call and notified me genesis this home aide was ill   RUE Assessment   RUE Assessment   (shoulder flex/abd > 90)   LUE Assessment   LUE Assessment   (shoulder flex/abd > 90)   Strength RLE   R Hip Flexion 4/5   R Knee Extension 4/5   R Ankle Dorsiflexion 4/5   Strength LLE   L Hip Flexion 4/5   L Knee Extension 4/5   L Ankle Dorsiflexion 3+/5   Vision-Basic Assessment   Current Vision Wears glasses all the time   Light Touch   RLE Light Touch Grossly intact   LLE Light Touch Grossly intact   Bed Mobility   Supine to Sit 6  Modified independent   Additional items Assist x 1;HOB elevated; Bedrails; Increased time required;Verbal cues   Sit to Supine 6  Modified independent   Additional items Assist x 1;Bedrails;HOB elevated; Increased time required   Transfers   Additional Comments Trunk : uneven height @ shoulders   excessive lumbar lordosis in standing when standing "upright", but reports hving pain when trunk in upright neutral, and reports that he needs to flex forward   Ambulation/Elevation   Gait pattern   (increased walker advancement with substantial forward flexion but no uncorrected losses of balance)   Gait Assistance 5  Supervision   Additional items Assist x 1;Verbal cues   Assistive Device Rolling walker   Distance 35'   Stair Management Assistance   (Pt declined physical performance on stairs--stating that he did not anticipate difficulty on CATHY upon DC w/ railing @ home )   Balance   Static Sitting Good   Static Standing Fair -   Ambulatory Poor +   Endurance Deficit   Endurance Deficit Yes   Endurance Deficit Description limited amb distance and standing tolerance   Activity Tolerance   Activity Tolerance Patient limited by pain; Patient limited by fatigue   Medical Staff Made Aware spoke to Dr Rayshawn Ag at start of session and messaged after session  Spoke to Delonte pickett CM re: pt needing transport home   Nurse Made Aware spoke to RN   Assessment   Prognosis Fair   Problem List Decreased strength;Decreased endurance; Impaired balance;Decreased mobility;Pain;Decreased range of motion  (trunk ROM/posture, gait deviations)   Assessment Pt is a 80 y o  male seen for PT evaluation s/p admit to [facility] on 1/31/2023 w/ Hydronephrosis with ureteral calculus  Pt had above procedure  Order placed for PT  Prior to admission: Pt lived primarily in one floor environment and used WC> walker for mobility  He was able to perform 3 CATHY, and has life alert plus home care BIW  Upon evaluation: Pt needed no physical A for bed mobility using hospital bed controls (has hospital bed at home)  He did not require physical A for transfers nor amb but does display substantial gait deviation of forward flexion  He declined performance on stairs to mimic CATHY, stating that he does "not anticipate difficulty on them upon DC"         Pt's clinical presentation is currently unstable/unpredictable given the functional mobility deficits above, especially (but not limited to) weakness, pain and decreased functional mobility tolerance, coupled with fall risks including hx of falls and impaired balance, and combined with medical complications of bradycardia and abnormal potassium values  If pt not DCed, Recommend continued mobility trials w/ rolling walker for distances up to 40' w/less gait deviations, stair training if agreeable  Barriers to Discharge Decreased caregiver support; Inaccessible home environment   Goals   Patient Goals to go home today   STG Expiration Date 02/12/23  (if not DCed)   Short Term Goal #1 Pt will: Perform bed mobility tasks with consistent modified I to prepare for transfers and reposition in bed  Perform transfers with consistent modified I to improve ease of transfers from various heights  Perform ambulation with rolling walker for up to 40' with S to improve gait quality and promote proper use of assistive device  Perform 3 stairs w/ railing and w/no more than min A to return to home with CATHY  Propel WC for > 50' to promote mobility in one floor home   PT Treatment Day 0   Plan   Treatment/Interventions Functional transfer training;LE strengthening/ROM; Therapeutic exercise; Endurance training;Patient/family training;Equipment eval/education;Gait training;Elevations; Bed mobility;Spoke to nursing;Spoke to case management   PT Frequency 3-5x/wk   Recommendation   PT Discharge Recommendation Home with home health rehabilitation  (however pt declined HHPT upon questioning)   Equipment Recommended Wheelchair  (has one--strongly recommend WC as primary means of mobility in home)   AM-PAC Basic Mobility Inpatient   Turning in Flat Bed Without Bedrails 3   Lying on Back to Sitting on Edge of Flat Bed Without Bedrails 3  (Pt has hospital bed)   Moving Bed to Chair 3   Standing Up From Chair Using Arms 3   Walk in Room 3   Climb 3-5 Stairs With Railing 2   Basic Mobility Inpatient Raw Score 17   Basic Mobility Standardized Score 39 67   Highest Level Of Mobility   JH-HLM Goal 5: Stand one or more mins   JH-HLM Achieved 7: Walk 25 feet or more   End of Consult   Patient Position at End of Consult Supine; All needs within reach;Bed/Chair alarm activated   (Please find full objective findings from PT assessment regarding body systems outlined above)  During this admission, pt would benefit from continued skilled inpatient PT in the acute care setting in order to address deficits as defined above to maximize function and mobility    Co-morbidities affecting pt's physical performance at time of assessment include: Bradycardia, (DJD) of lumbar spine, Hypertension, Kidney stones, Pacemaker, Cardiac pacemaker placement  Personal factors affecting pt at time of IE include: steps to enter environment, limited home support, advanced age, past experience, behavioral pattern, inability to navigate community distances and recent fall(s)  The patient's [facility] Basic Mobility Inpatient Short Form Raw Score is 17  A Raw score of greater than 16 suggests the patient may benefit from discharge to home, which DOES coincide with CURRENT above PT recommendations  However please refer to therapist recommendation for discharge planning given other factors that may influence destination  Adapted from [facility] “6-Clicks” Basic Mobility and Daily Activity Scores With Discharge Destination  Physical Therapy, 2021;101:1-9  DOI: [id number]    Portions of the record may have been created with voice recognition software  Occasional wrong word or "sound a like" substitutions may have occurred due to the inherent limitations of voice recognition software    Read the chart carefully and recognize, using context, where substitutions have occurred

## 2023-02-02 NOTE — UTILIZATION REVIEW
Initial Clinical Review    Admission: Date/Time/Statement:   Admission Orders (From admission, onward)     Ordered        02/01/23 0308  INPATIENT ADMISSION  Once                      Orders Placed This Encounter   Procedures   • INPATIENT ADMISSION     Standing Status:   Standing     Number of Occurrences:   1     Order Specific Question:   Level of Care     Answer:   Med Surg [16]     Order Specific Question:   Estimated length of stay     Answer:   More than 2 Midnights     Order Specific Question:   Certification     Answer:   I certify that inpatient services are medically necessary for this patient for a duration of greater than two midnights  See H&P and MD Progress Notes for additional information about the patient's course of treatment  ED Arrival Information     Expected   -    Arrival   1/31/2023 20:05    Acuity   Urgent            Means of arrival   Ambulance    Escorted by   [facility]    Service   Hospitalist    Admission type   Emergency            Arrival complaint   EMS           Chief Complaint   Patient presents with   • Abdominal Pain     Pt reports lower abdominal pain, started at 2pm  Had BM since, with no blood  Denies nausea, vomiting or diarrhea        Initial Presentation: 80 y o  male PMH of HTN, BPH, aortic aneurysm and kidney stones who presents to ED 1/31 from home via EMS with RLQ abdominal pain since 1400 yesterday  After urinating in the ED his pain seemed to improve  Denies dysuria or hematuria  Having some left-sided chest pain/ discomfort at this time, which he reports having for the past 2 hours  On exam, no abdominal tenderness at this time, abdomen soft   Trace BLE edema   Normal heart  Rhythm, bradycardic   Labs WBC 11 87, troponin WNL , UA unremarkable   [facility] shows R hydronephrosis w/ 1x 0 8 cm stone in prox R ureter  Pt given IVF in ED  Pt admitted as Inpatient to telemetry 2/1 with hydronephrosis w/ R ureteral calculus ,leukocytosis,  chest pain   Plan - Urology consult, NPO, IVF, pain control, strain all urine  CBC in am   Telemetry  Monitor troponin   Hold home Metoprolol for HR <55, hold HCTZ   urology consult- Recommend placement of a ureteral stent in a staged fashion with consideration of a ureteroscopic stone intervention in the future white blood cell count down to 8 38 2/1  Creat stable   Pt pain free since 1900 1/31  Mild abdominal discomfort in right lower quadrant on palpation  Continue IVF  2/1/23 @1614  Right - CYSTOSCOPY RETROGRADE PYELOGRAM WITH INSERTION STENT URETERAL  General anesthesia  Operative Findings:  Cloudy urine mixed with blood is noted to drain from the right collecting system upon wire and stent placement  A urine culture was sent today  Successful placement of a 6 [place] by 26 cm ureteral stent was performed  We will arrange for subsequent ureteroscopic stone intervention at an interval as a planned readmission to hospital      Date: 2/2  POD #1 Day 2:    Medicine- leukocytosis resolved   Had some episode of hematuria which has since then resolved  Remains afebrile  Reports pain improved   ECG w/o signs ischemia, paced rhythm   Serial cardiac enzymes were negative for acute coronary syndrome  No further inpatient cardiac work-up   Resume HCTZ on d /c  Will d/c on empiric abx pending urine cx results        ED Triage Vitals   Temperature Pulse Respirations Blood Pressure SpO2   01/31/23 2012 01/31/23 2008 01/31/23 2008 01/31/23 2008 01/31/23 2008   97 8 °F (36 6 °C) (!) 53 18 155/72 95 %      Temp Source Heart Rate Source Patient Position - Orthostatic VS BP Location FiO2 (%)   01/31/23 2012 01/31/23 2008 01/31/23 2315 01/31/23 2330 --   Oral Monitor Lying Right arm       Pain Score       01/31/23 2008       6          Wt Readings from Last 1 Encounters:   02/02/23 92 7 kg (204 lb 5 9 oz)     Additional Vital Signs:   Date/Time Temp Pulse Resp BP MAP (mmHg) SpO2 O2 Flow Rate (L/min)   02/02/23 08:57:44 -- 50 Abnormal  -- 141/66 91 96 % --   02/02/23 07:15:52 97 5 °F (36 4 °C) 56 17 155/69 98 94 % --   02/02/23 05:47:19 -- 69 -- 148/73 98 96 % --   02/01/23 22:17:03 97 7 °F (36 5 °C) 53 Abnormal  18 137/65 89 96 % --   02/01/23 1800 -- -- -- -- -- -- --   02/01/23 17:50:57 97 8 °F (36 6 °C) 52 Abnormal  18 151/73 99 97 % --   02/01/23 17:28:45 97 7 °F (36 5 °C) 49 Abnormal  18 147/76 100 100 % --   02/01/23 1700 -- 52 Abnormal  20 143/70 109 97 % --   02/01/23 1645 -- 52 Abnormal  20 129/66 92 97 % --   02/01/23 1631 -- 58 20 136/70 97 100 % 4 L/min   02/01/23 1628 97 5 °F (36 4 °C) 58 20 149/72 103 100 % 4 L/min   02/01/23 1430 -- 50 Abnormal  16 132/69 95 98 % --   02/01/23 1400 -- 50 Abnormal  18 133/69 95 98 % --   02/01/23 1230 -- 50 Abnormal  18 130/68 93 95 % --   02/01/23 1200 -- 50 Abnormal  18 130/65 92 96 % --   02/01/23 1130 -- 50 Abnormal  18 127/68 92 98 % --   02/01/23 1100 -- 50 Abnormal  -- 133/68 95 100 % --   02/01/23 0915 -- 50 Abnormal  18 -- -- 98 % --   02/01/23 0900 -- 50 Abnormal  -- 135/67 95 96 % --   02/01/23 0813 -- -- -- 135/70 -- -- --   02/01/23 0800 -- 50 Abnormal  -- 135/70 93 98 % --   02/01/23 0730 -- 50 Abnormal  -- 110/65 83 98 % --   02/01/23 0645 -- 50 Abnormal  20 -- -- 97 % --   02/01/23 0630 -- 50 Abnormal  -- 141/68 95 98 % --   02/01/23 0600 -- 50 Abnormal  18 121/65 87 95 % --   02/01/23 0530 -- 54 Abnormal  18 133/67 93 96 % --   02/01/23 0519 -- 59 -- -- -- -- --   02/01/23 0500 -- 50 Abnormal  18 129/64 91 95 % --   02/01/23 0430 -- 54 Abnormal  18 129/69 93 97 % --   02/01/23 0330 -- 52 Abnormal  18 122/61 85 95 % --   02/01/23 0215 -- 54 Abnormal  16 142/69 99 97 % --   02/01/23 0000 -- 54 Abnormal  18 131/68 94 96 % --   01/31/23 2330 -- 56 18 127/60 87 96 % --   01/31/23 2315 -- 56 18 141/65 -- 98 % --     Pertinent Labs/Diagnostic Test Results:    2/13-UIL-[id number] ventricular pacemaker    CT abdomen pelvis with contrast   ED Interpretation by Ashanti Can DO (02/01 0241)   From Vrad:     Impression:   1  Mild right hydronephrosis to a 1 0 x 0 8 cm stone in proximal right ureter  2  Urothelial enhancement of the R renal pelvis and proximal right ureter may indicate an upper R urinary tract infection   3  Mild bladder wall thickening may be related to underdistention or cystitis  4  At least 2 punctuate nonobstructive renal stones bilaterally  No left hydronephrosis  : Alfonso Schultz MD      Final Result by Jatin Barker MD (02/01 0209)      7 mm proximal right ureteral calculus just distal to the right UPJ with mild right hydronephrosis  Associated urothelial enhancement suggesting potential upper tract infection  Punctate bilateral nonobstructing renal calculi              Workstation performed: [id number]                  Results from last 7 days   Lab Units 02/02/23 0532 02/01/23 0518 01/31/23 2019   WBC Thousand/uL 8 21 8 38 11 87*   HEMOGLOBIN g/dL 14 0 12 6 14 2   HEMATOCRIT % 41 3 36 8 41 4   PLATELETS Thousands/uL 258 234  234 240   NEUTROS ABS Thousands/µL 7 32 5 39 9 95*         Results from last 7 days   Lab Units 02/01/23 0518 01/31/23  2019   SODIUM mmol/L 139 138   POTASSIUM mmol/L 3 1* 3 8   CHLORIDE mmol/L 106 105   CO2 mmol/L 26 25   ANION GAP mmol/L 7 8   BUN mg/dL 18 23   CREATININE mg/dL 0 62 0 69   EGFR ml/min/1 73sq m 89 85   CALCIUM mg/dL 8 8 9 1   MAGNESIUM mg/dL 1 8*  --      Results from last 7 days   Lab Units 01/31/23  2019   AST U/L 25   ALT U/L 23   ALK PHOS U/L 55   TOTAL PROTEIN g/dL 6 7   ALBUMIN g/dL 3 8   TOTAL BILIRUBIN mg/dL 0 58         Results from last 7 days   Lab Units 02/01/23 0518 01/31/23  2019   GLUCOSE RANDOM mg/dL 97 151*               Results from last 7 days   Lab Units 02/01/23  1114 02/01/23 0818 02/01/23 0518   HS TNI 0HR ng/L  --   --  9   HS TNI 2HR ng/L  --  9  --    HSTNI D2 ng/L  --  0  --    HS TNI 4HR ng/L 7  --   --    HSTNI D4 ng/L -2  --   --                Results from last 7 days   Lab Units 01/31/23  2019   LIPASE u/L 14                 Results from last 7 days   Lab Units 02/01/23  0450   CLARITY UA  Clear   COLOR UA  Light Yellow   SPEC GRAV UA  1 024   PH UA  6 5   GLUCOSE UA mg/dl Negative   KETONES UA mg/dl Negative   BLOOD UA  Negative   PROTEIN UA mg/dl Negative   NITRITE UA  Negative   BILIRUBIN UA  Negative   UROBILINOGEN UA (BE) mg/dl <2 0   LEUKOCYTES UA  Negative                     ED Treatment:   Medication Administration from 01/31/2023 2002 to 02/01/2023 1504       Date/Time Order Dose Route Action     02/01/2023 0029 EST iohexol (OMNIPAQUE) 350 MG/ML injection (SINGLE-DOSE) 100 mL 100 mL Intravenous Given     02/01/2023 0813 EST amLODIPine (NORVASC) tablet 5 mg 5 mg Oral Given     02/01/2023 0519 EST metoprolol tartrate (LOPRESSOR) partial tablet 12 5 mg 12 5 mg Oral Given     02/01/2023 0519 EST heparin (porcine) subcutaneous injection 5,000 Units 5,000 Units Subcutaneous Given     02/01/2023 0825 EST sodium chloride 0 9 % infusion 0 mL/hr Intravenous Stopped     02/01/2023 0816 EST sodium chloride 0 9 % infusion 50 mL/hr Intravenous New Bag     02/01/2023 0825 EST sodium chloride 0 9 % infusion 75 mL/hr Intravenous Rate/Dose Change        Past Medical History:   Diagnosis Date   • Balanitis 1/2/2020   • Basal cell carcinoma    • Bradycardia    • Degenerative joint disease (DJD) of lumbar spine    • Hypertension    • Kidney stones    • Pacemaker      Present on Admission:  • Hypertension  • Chest pain      Admitting Diagnosis: Hydronephrosis [N13 30]  Nephrolithiasis [N20 0]  Abdominal pain [R10 9]  Hydronephrosis with ureteral calculus [N13 2]  Age/Sex: 80 y o  male  Admission Orders:  Scheduled Medications:  amLODIPine, 5 mg, Oral, BID  heparin (porcine), 5,000 Units, Subcutaneous, Q8H [address]  metoprolol tartrate, 12 5 mg, Oral, Q12H  phenazopyridine, 100 mg, Oral, TID With Meals      Continuous IV Infusions:    lactated ringers infusion  Rate: 125 mL/hr Dose: 125 mL/hr  Freq: Continuous Route: IV  Indications of Use: IV Hydration  Last Dose: Stopped (02/01/23 1839)  Start: 02/01/23 1515 End: 02/01/23 1837  lactated ringers infusion  Dose: 75 mL/hr  Freq: Once Route: IV  Indications of Use: IV Hydration  Last Dose: 75 mL/hr (02/01/23 1910)  Start: 02/01/23 1845 End: 02/01/23 1910  PRN Meds:  acetaminophen, 650 mg, Oral, Q6H PRN  oxybutynin, 5 mg, Oral, TID PRN x1 2/2    strain all urine    IP CONSULT TO UROLOGY    Network Utilization Review Department  ATTENTION: Please call with any questions or concerns to [phone] and carefully listen to the prompts so that you are directed to the right person  All voicemails are confidential   [place] all requests for admission clinical reviews, approved or denied determinations and any other requests to dedicated fax number below belonging to the campus where the patient is receiving treatment   List of dedicated fax numbers for the Facilities:  [address] DENIALS (Administrative/Medical Necessity) [phone]   [address] (Maternity/NICU/Pediatrics) [phone]   [address] Carlene Rodas [phone]   [facility] [phone]   [address] [place] [address] [address] [place] [place] [address] Aime Cherry 28 [phone]   [address] [place] [facility] [place] [address] [facility] [phone]

## 2023-02-02 NOTE — PLAN OF CARE
Problem: PHYSICAL THERAPY ADULT  Goal: Performs mobility at highest level of function for planned discharge setting  See evaluation for individualized goals  Description: Treatment/Interventions: Functional transfer training, LE strengthening/ROM, Therapeutic exercise, Endurance training, Patient/family training, Equipment eval/education, Gait training, Elevations, Bed mobility, Spoke to nursing, Spoke to case management  Equipment Recommended: Wheelchair (has one--strongly recommend WC as primary means of mobility in home)       See flowsheet documentation for full assessment, interventions and recommendations  Note: Prognosis: Fair  Problem List: Decreased strength, Decreased endurance, Impaired balance, Decreased mobility, Pain, Decreased range of motion (trunk ROM/posture, gait deviations)  Assessment: Pt is a 80 y o  male seen for PT evaluation s/p admit to [facility] on 1/31/2023 w/ Hydronephrosis with ureteral calculus  Pt had above procedure  Order placed for PT  Prior to admission: Pt lived primarily in one floor environment and used WC> walker for mobility  He was able to perform 3 CATHY, and has life alert plus home care BIW  Upon evaluation: Pt needed no physical A for bed mobility using hospital bed controls (has hospital bed at home)  He did not require physical A for transfers nor amb but does display substantial gait deviation of forward flexion  He declined performance on stairs to mimic CATHY, stating that he does "not anticipate difficulty on them upon DC"     Pt's clinical presentation is currently unstable/unpredictable given the functional mobility deficits above, especially (but not limited to) weakness, pain and decreased functional mobility tolerance, coupled with fall risks including hx of falls and impaired balance, and combined with medical complications of bradycardia and abnormal potassium values     If pt not DCed, Recommend continued mobility trials w/ rolling walker for distances up to 40' w/less gait deviations, stair training if agreeable  Barriers to Discharge: Decreased caregiver support, Inaccessible home environment     PT Discharge Recommendation: Home with home health rehabilitation (however pt declined HHPT upon questioning)    See flowsheet documentation for full assessment

## 2023-02-02 NOTE — DISCHARGE SUMMARY
[facility]  Discharge- Damien Pedro 1935, 80 y o  male MRN: [id number]  Unit/Bed#: S -01 Encounter: [id number]  Primary Care Provider: Yasir Nichole MD   Date and time admitted to hospital: 1/31/2023  8:05 PM    * Hydronephrosis with ureteral calculus  Assessment & Plan  · Patient with history of kidney stones presented with RLQ abdominal pain  · CT A/P (preliminary report): "Mild right hydronephrosis due to a 1 0 x 0 8 cm stone in proximal right ureter  Urothelial enhancement of the R renal pelvis and proximal right ureter may indicate an upper R urinary tract infection  Mild bladder wall thickening may be related to underdistention or cystitis "   · Patient underwent cystoscopy with retrograde pyelogram and right distal ureteric stent placement  Pending staged urological procedure at a later date  Continue with outpatient urology follow-up  Ascending aortic aneurysm  Assessment & Plan  · Stable, 4 4 cm, on most imaging 10/2021  Leukocytosis  Assessment & Plan  · POA with WBC 11 87  · Leukocytosis has resolved  Chest pain  Assessment & Plan  · Reports left-sided chest pain/ discomfort for the past 2 hours  Admits to similar pain in the past    · No prior h/o CAD  H/o pacemaker placement for bradycardia and h/o aortic aneurysm  · EKG without any signs of ischemia but showed a paced rhythm  Serial cardiac enzymes were negative for acute coronary syndrome  No further inpatient cardiac work-up was recommended  Hypertension  Assessment & Plan  · BP acceptable  · Continue amlodipine and metoprolol  · Patient bradycardic since presentation, hold metoprolol for HR < 55  · Resume HCTZ on discharge          Medical Problems     Resolved Problems  Date Reviewed: 2/1/2023   None       Discharging Physician / Practitioner: Sybil Lopez MD  PCP: Yasir Nichole MD  Admission Date:   Admission Orders (From admission, onward)     Ordered        02/01/23 0308 INPATIENT ADMISSION  Once                      Discharge Date: 02/02/23    Consultations During Hospital Stay:  · Urology    Procedures Performed:   · Cystoscopy, right-sided ureteric stent placement  Significant Findings / Test Results:      CT scan of the abdomen :7 mm proximal right ureteral calculus just distal to the right UPJ with mild right hydronephrosis  Associated urothelial enhancement suggesting potential upper tract infection    Punctate bilateral nonobstructing renal calculi  Incidental Findings:   NA  Test Results Pending at Discharge (will require follow up):   · NA     Outpatient Tests Requested:  · Outpatient urology follow-up    Complications:  None     Reason for Admission: Abdominal pain    Hospital Course: Johan Liz is a 80 y o  male patient who originally presented to the hospital on 1/31/2023 due to abdominal pain  Was found to have 7 mm proximal right ureteric calculus  With right hydronephrosis  Patient was seen by urology and underwent right-sided ureteric stent placement and cystoscopy  We will get staged procedure at a later date  Will be discharged empirically on antibiotics pending urine culture results and will follow-up with urology as an outpatient  Please see above list of diagnoses and related plan for additional information  Condition at Discharge: stable    Discharge Day Visit / Exam:   Subjective: Patient reports improvement in pain  Had some episode of hematuria which has since then resolved  Remains afebrile  Vitals: Blood Pressure: 141/66 (02/02/23 0857)  Pulse: (!) 50 (02/02/23 0857)  Temperature: 97 5 °F (36 4 °C) (02/02/23 0715)  Temp Source: Oral (01/31/23 2012)  Respirations: 17 (02/02/23 0715)  Weight - Scale: 92 7 kg (204 lb 5 9 oz) (02/02/23 0547)  SpO2: 96 % (02/02/23 0857)  Exam:   Physical Exam  Constitutional:       General: He is not in acute distress  HENT:      Head: Normocephalic        Right Ear: Tympanic membrane normal  Nose: Nose normal       Mouth/Throat:      Mouth: Mucous membranes are moist    Eyes:      Extraocular Movements: Extraocular movements intact  Cardiovascular:      Rate and Rhythm: Normal rate and regular rhythm  Pulses: Normal pulses  Pulmonary:      Effort: Pulmonary effort is normal       Breath sounds: Normal breath sounds  Abdominal:      General: Abdomen is flat  Musculoskeletal:      Cervical back: Neck supple  Right lower leg: No edema  Left lower leg: No edema  Skin:     General: Skin is warm  Neurological:      General: No focal deficit present  Mental Status: He is alert  Mental status is at baseline  Deep Tendon Reflexes: Reflexes abnormal    Psychiatric:         Mood and Affect: Mood normal          Discussion with Family: Patient declined call to   Discharge instructions/Information to patient and family:   See after visit summary for information provided to patient and family  Provisions for Follow-Up Care:  See after visit summary for information related to follow-up care and any pertinent home health orders  Disposition:   Home    Planned Readmission: No   Discharge Statement:  I spent 40 minutes discharging the patient  This time was spent on the day of discharge  I had direct contact with the patient on the day of discharge  Greater than 50% of the total time was spent examining patient, answering all patient questions, arranging and discussing plan of care with patient as well as directly providing post-discharge instructions  Additional time then spent on discharge activities  Discharge Medications:  See after visit summary for reconciled discharge medications provided to patient and/or family        **Please Note: This note may have been constructed using a voice recognition system**

## 2023-02-02 NOTE — PROGRESS NOTES
Progress Note - Urology  Loida Duvall 1935, 80 y o  male MRN: [id number]    Unit/Bed#: S -01 Encounter: [id number]    Patient is a  80year old male who is POD#1 for cystoscopy, retrograde pyelogram, and insertion of right ureteral stent for treatment of right proximal ureteral stone with right sided hydronephrosis  Plan will be for definitive stone treatment with the next available urologist      -Patient remains afebrile, HR is occasionally bradycardic in context of beta-blockade    -no leukocytosis  -hemoglobin is stable at 14 0   -Patient reports stent related discomfort and urinary symptoms  Discussed with the patient that the symptoms he is experiencing can unfortunately be expected with stent in place   -Can utilize Tylenol  Can also utilize Pyridium for max of 3 days for dysuria or oxybutynin 2 times as needed for bladder spasms/stent discomfort - oxybutynin can result in constipation so should be limited as muc as possible since patient has had prior constipation   -Task already sent by Dr Stepan Ybarra to set up definitive stone treatment    -Overall patient appears stable for discharge    -As no further urologic event intervention is anticipated during this hospitalization course and follow-up has been obtained, the urologic service will sign off but is available for any questions or concerns regarding the care of this patient   Subjective:   HPI: Patient notes that he has some slight discomfort with urination -mostly at the end of urination  He also endorses some dysuria as well as mild to moderate hematuria, frequency, urgency  He denies subjective fevers, chills, chest pain, shortness of breath, calf pain  He has been ambulating with his walker  Tolerating food and drink  No nausea or vomiting  Has bene passing flatus  No bm as of yet  Urinating spontaneously  Review of Systems   Constitutional: Negative for chills, fatigue and fever  HENT: Negative      Eyes: Negative  Respiratory: Negative for shortness of breath  Cardiovascular: Negative for chest pain  Gastrointestinal: Negative  Negative for nausea and vomiting  Endocrine: Negative  Genitourinary: Positive for dysuria, flank pain, frequency, hematuria and urgency  Negative for decreased urine volume and difficulty urinating  Skin: Negative  Allergic/Immunologic: Negative  Neurological: Negative  Hematological: Negative  Psychiatric/Behavioral: Negative  Objective:  Nursing Rounds:   Vitals: Blood pressure 141/66, pulse (!) 50, temperature 97 5 °F (36 4 °C), resp  rate 17, weight 92 7 kg (204 lb 5 9 oz), SpO2 96 %  ,Body mass index is 26 96 kg/m²  Physical Exam  Constitutional:       General: He is not in acute distress  Appearance: Normal appearance  He is normal weight  He is not ill-appearing or toxic-appearing  HENT:      Head: Normocephalic and atraumatic  Right Ear: External ear normal       Left Ear: External ear normal    Cardiovascular:      Rate and Rhythm: Bradycardia present  Comments: In context of bradycardia  Pulmonary:      Effort: Pulmonary effort is normal  No respiratory distress  Abdominal:      General: Abdomen is flat  Palpations: Abdomen is soft  Tenderness: There is no abdominal tenderness  Musculoskeletal:         General: Normal range of motion  Cervical back: Normal range of motion and neck supple  Skin:     General: Skin is warm and dry  Neurological:      General: No focal deficit present  Mental Status: He is alert and oriented to person, place, and time  Cranial Nerves: No cranial nerve deficit  Psychiatric:         Mood and Affect: Mood normal          Behavior: Behavior normal          Thought Content:  Thought content normal          Judgment: Judgment normal          Labs:  Recent Labs     01/31/23 2019 02/01/23  0518 02/02/23  0532   WBC 11 87* 8 38 8 21     Recent Labs     01/31/23 2019 02/01/23 0518 02/02/23  0532   HGB 14 2 12 6 14 0     Recent Labs     01/31/23 2019 02/01/23  0518 02/02/23  0532   HCT 41 4 36 8 41 3     Recent Labs     01/31/23 2019 02/01/23 0518   CREATININE 0 69 0 62         History:  Past Medical History:   Diagnosis Date   • Balanitis 1/2/2020   • Basal cell carcinoma    • Bradycardia    • Degenerative joint disease (DJD) of lumbar spine    • Hypertension    • Kidney stones    • Pacemaker      Social History     Socioeconomic History   • Marital status:       Spouse name: None   • Number of children: None   • Years of education: None   • Highest education level: None   Occupational History   • Occupation: Retired   Tobacco Use   • Smoking status: Never   • Smokeless tobacco: Never   Vaping Use   • Vaping Use: Never used   Substance and Sexual Activity   • Alcohol use: Not Currently     Comment: occasionally; Per [employer]  No Alcohol use   • Drug use: No   • Sexual activity: Not Currently   Other Topics Concern   • None   Social History Narrative   • None     Social Determinants of Health     Financial Resource Strain: Not on file   Food Insecurity: Not on file   Transportation Needs: Not on file   Physical Activity: Not on file   Stress: Not on file   Social Connections: Not on file   Intimate Partner Violence: Not on file   Housing Stability: Not on file     Past Surgical History:   Procedure Laterality Date   • CARDIAC PACEMAKER PLACEMENT     • CHOLECYSTECTOMY     • EYE SURGERY     • IN CYSTO BLADDER W/URETERAL CATHETERIZATION Right 2/1/2023    Procedure: [facility];  Surgeon: Gaby Guzman MD;  [facility];  Service: Urology   • IN HEMIARTHROPLASTY HIP PARTIAL Right 10/10/2021    Procedure: HEMIARTHROPLASTY HIP (BIPOLAR), RIGHT;  Surgeon: Russ Garcia MD;  Location[facility];  Service: Orthopedics   • TONSILLECTOMY       Family History   Problem Relation Age of Onset   • Diabetes Mother    • Heart disease Mother        Angelica Due  Date: 2/2/2023 Time: 12:30 PM

## 2023-02-02 NOTE — CASE MANAGEMENT
Case Management Assessment & Discharge Planning Note    Patient name Juan Mi  Location S /S -11 MRN [id number]  : 1935 Date 2023       Current Admission Date: 2023  Current Admission Diagnosis:Hydronephrosis with ureteral calculus   Patient Active Problem List    Diagnosis Date Noted   • Hydronephrosis with ureteral calculus 2023   • Leukocytosis 2023   • Ascending aortic aneurysm 2023   • Chest pain 2022   • Ambulatory dysfunction 2022   • Aftercare following surgery of the musculoskeletal system 10/29/2021   • Constipation 10/16/2021   • Diuretic-induced hypokalemia 10/16/2021   • Presence of cardiac pacemaker 10/16/2021   • Full code status 10/15/2021   • BPH (benign prostatic hyperplasia) 10/11/2021   • Fall 10/10/2021   • Closed fracture of neck of right femur with routine healing 10/10/2021   • Abnormal finding on CT scan 10/10/2021   • SVT (supraventricular tachycardia) ([address] ) 2021   • Nocturia 2021   • Dry eye 2019   • Gait disturbance 2019   • Obsessive compulsive disorder 2019   • Dysthymic disorder 2019   • Cervical radiculopathy 2016   • LVH (left ventricular hypertrophy) 2015   • Aortic valve disease 2015   • Hypertension 2013   • Other malignant neoplasm of unspecified site 2013   • Spinal stenosis 2013      LOS (days): 1  Geometric Mean LOS (GMLOS) (days): 1 80  Days to GMLOS:0 3     OBJECTIVE:    Risk of Unplanned Readmission Score: 11 11         Current admission status: Inpatient       Preferred Pharmacy:   [address] [place] [id number]  Phone: [phone] Fax: [phone]    Primary Care Provider: Ashley Bradley MD    Primary Insurance: [address] [place] [address]  Secondary Insurance:     ASSESSMENT:  [address] Representative - Son   Primary Phone: [phone] (Mobile)               Advance Directives  Does patient have a [address]?: Yes  Does patient have Advance Directives?: Yes  Advance Directives: Power of  for health care, Living will  Primary Contact: Richie Ruelas (Son)         Readmission Root Cause  30 Day Readmission: No    Patient Information  Admitted from[de-identified] Home  Mental Status: Alert  During Assessment patient was accompanied by: Not accompanied during assessment  Assessment information provided by[de-identified] Patient  Primary Caregiver: Self  Support Systems: Self, Family members, Other (Comment) (Personal care attendant 4 hours a day Monday - Friday)  [place] of Residence: [address] [facility] do you live in?: [address] entry access options  Select all that apply : Stairs  Number of steps to enter home : 3  Do the steps have railings?: Yes  Type of Current Residence: 2 story home  Upon entering residence, is there a bedroom on the main floor (no further steps)?: Yes  Upon entering residence, is there a bathroom on the main floor (no further steps)?: Yes  In the last 12 months, was there a time when you were not able to pay the mortgage or rent on time?: No  In the last 12 months, how many places have you lived?: 1  In the last 12 months, was there a time when you did not have a steady place to sleep or slept in a shelter (including now)?: No  Homeless/housing insecurity resource given?: N/A  Living Arrangements: Lives Alone  Is patient a ?: No    Activities of Daily Living Prior to Admission  Functional Status: Independent  Completes ADLs independently?: Yes  Ambulates independently?: Yes  Does patient use assisted devices?: Yes  Assisted Devices (DME) used:  Wheelchair, Walker, Shower Chair, Rollator, Stair Chair/[place]  Does patient currently own DME?: Yes  What DME does the patient currently own?: Michell Fragaia, Wheelchair, Shower Chair, Rollator, Stair Chair/Glide  Does patient have a history of Outpatient Therapy (PT/OT)?: No  Does the patient have a history of Short-Term Rehab?: Yes ([facility])  Does patient have a history of HHC?: Yes (Hiral Neal)  Does patient currently have Adria Cooney?: No         Patient Information Continued  Income Source: SSI/SSD  Does patient have prescription coverage?: Yes (No issues with getting or affording his medications)  Within the past 12 months, you worried that your food would run out before you got the money to buy more : Never true  Within the past 12 months, the food you bought just didn't last and you didn't have money to get more : Never true  Food insecurity resource given?: N/A  Does patient receive dialysis treatments?: No  Does patient have a history of substance abuse?: No  Does patient have a history of Mental Health Diagnosis?: No         Means of Transportation  Means of Transport to Appts[de-identified] Other (Comment) (Family or his HHA aide typically provide transportation)  In the past 12 months, has lack of transportation kept you from medical appointments or from getting medications?: No  In the past 12 months, has lack of transportation kept you from meetings, work, or from getting things needed for daily living?: No  Was application for public transport provided?: N/A        DISCHARGE DETAILS:    Discharge planning discussed with[de-identified] Patient and VM left for son  Freedom of Choice: Yes  Comments - Freedom of Choice: Discussed recommnedation for HHC at [facility]  CM contacted family/caregiver?: Yes (Called patient's son however was only able to leave a voicemail)  Were Treatment Team discharge recommendations reviewed with patient/caregiver?: Yes  Did patient/caregiver verbalize understanding of patient care needs?: Yes  Were patient/caregiver advised of the risks associated with not following Treatment Team discharge recommendations?: Yes    Contacts  Patient Contacts: Patient  Relationship to Patient[de-identified] Other (Comment)  Contact Method:  In Person  Reason/Outcome: Discharge Planning, Continuity of Care    Requested [address]         Is the patient interested in [address] at discharge?: No    DME Referral Provided  Referral made for DME?: No    Other Referral/Resources/Interventions Provided:  Interventions: Transportation  Referral Comments: Patient reported he would need a ride home at DC  CM discussed a [facility] vs Ly[employer]  Patient was not sure he would be able to get out of a car so went with a [facility]  CM reviewed the possible out of pocket cost and confirmed he had a way in to his home  Treatment Team Recommendation: Home with [address]  Discharge Destination Plan[de-identified] Home  Transport at Discharge : [address]: Yes     Transported by [place] and Unit #): [employer]  ETA of Transport (Date): 02/02/23  ETA of Transport (Time): 6463                          CM met with patient at bedside  Patient is alert and oriented laying in bed  CM name and role reviewed  CM Assessment completed  CM reviewed recommendation for [address] at DC  Patient declined needing HHC at [place] at this time  He had verbalized to PT earlier as well that he would not be interested  CM discussed with patient that if he changed his mind once he was home he could reach out to his PCP  Patient stated he would need transport arranged to get home  CM discussed Lyft vs [facility] and patient is more comfortable in a [facility]  CM made a transport request for 3:30 PM   The transport was claimed by [employer] for 3:45 pm     SLIM, primary nurse, and patient all aware of DCP and transport time  CM called patient's son to review the DCP however was only able to leave a voicemail with contact information if he had any questions regarding the DC  CM encouraged patient to follow up with all recommended appointments after discharge  Patient advised of importance for patient and family to participate in managing patient's medical well being

## 2023-02-02 NOTE — Clinical Note
SURGERY DATE: 2/1/2023     Surgeon(s) and Role:     * Dusty Genao MD - Primary     Preop Diagnosis:  Nephrolithiasis [N20 0]     Post-Op Diagnosis Codes:     * Nephrolithiasis [N20 0]     Procedure(s):  Right - CYSTOSCOPY RETROGRADE PYELOGRAM WITH INSERTION STENT URETERAL    Kyler Joyce is a 80 y o  male with a PMH of HTN, BPH, aortic aneurysm and kidney stones who presents with RLQ abdominal pain  Patient reports that around 2pm yesterday he started having abdominal pain located in his RLQ  He states that he was concerned he may have appendicitis and therefore presented to the ED for evaluation  He notes that after urinating in the ED his pain seemed to improve  He denies dysuria or hematuria  He admits to some left-sided chest pain/ discomfort at this time, which he reports having for the past 2 hours and notes having similar chest pain in the past  He has attributed some of his prior episodes to indigestion  He denies SOB, recent cough, fevers/ chills, n/v/d  Patient Pre-hospital Living Situation: Home, Alone  Patient Pre-hospital Level of Mobility: walks with walker    PEr H&P:   Patient concerned about discharge from hospital as he lives at home, and has difficulty walking around, and does not have a car or means of transportation  Recommend case management help with discharge planning  Per prior eval 10/11/2021:   Home Living   Type of [place] [id number] to live on main level with bedroom/bathroom; Other (Comment); One level; Laundry in basement;Stairs to enter without rails  (2-3 CATHY, can use gate to hold on to)   [employer] unit  (Pt mentioned he was using baby wipes for showers)   Home Equipment Cane;Walker;Stair glide   Additional Comments Pt mentioned he was using a cane beforehand since he thought walkers were too bulky  Pt reports sleeping in a recliner chair and using baby wipes for bathing   Pt said he has a lift at home to get to the basement    Prior Function   Level of [place] Independent with ADLs and functional mobility   Lives With Alone   ADL Assistance Independent   IADLs Independent   Falls in the last 6 months 5 to 10   Vocational Retired   Comments Pt was found laying in bed  Pt was identified using name and  and verbally consented to PT evaluation and treatment  Pt mentioned that he was feeling better today and recently received pain medication  Restrictions/Precautions   Weight Bearing Precautions Per Order Yes   RLE Weight Bearing Per Order WBAT  (Posterior hip precautions)   Braces or Orthoses Other (Comment)  (Hip abduction pillow)   Other Precautions Bed Alarm; Chair Alarm;WBS;Multiple lines; Fall Risk;Pain;THR   General   Family/Caregiver Present No   Cognition   Overall Cognitive Status WFL   Attention Attends with cues to redirect   Orientation Level Oriented to person;Oriented to place   Memory Decreased recall of precautions   Following Commands Follows one step commands without difficulty   RUE Assessment   RUE Assessment WFL   RUE Strength   RUE Overall Strength Within Functional Limits - able to perform ADL tasks with strength  (3+/5)   LUE Assessment   LUE Assessment WFL   LUE Strength   LUE Overall Strength Within Functional Limits - able to perform ADL tasks with strength  (3+/5)   RLE Assessment   RLE Assessment X  (not assessed due to hip precautions)   LLE Assessment   LLE Assessment WFL  (3+/5)   Bed Mobility   Supine to Sit 3  Moderate assistance   Additional items Assist x 1; Increased time required;Verbal cues;LE management; Bedrails  (for trunk support and LE management)   Transfers   Sit to Stand 2  Maximal assistance   Additional items Assist x 1; Increased time required;Verbal cues  (for hip precautions, LE management, and UE support)   Stand to Sit 3  Moderate assistance   Additional items Assist x 1; Increased time required;Verbal cues  (For LE management and walker technique)   Ambulation/Elevation   Gait pattern Decreased R stance; Foward flexed; Short stride; Step to;Excessively slow   Gait Assistance 4  Minimal assist   Additional items Assist x 1;Verbal cues  (for LE management and walker technique)   Assistive Device Rolling walker   Distance 10 ft  (further distance not appropriate due to fatigue )   Stair Management Assistance Not tested  (pt not appropriate for stairs due to fatigue)   Balance   Static Sitting Fair   Static Standing Poor +   Ambulatory Poor +   Endurance Deficit   Endurance Deficit Yes   Activity Tolerance   Activity Tolerance Patient limited by fatigue;Patient limited by pain   Medical Staff Made Aware spoke to Mayra Pope OT   Nurse Made Aware Spoke to Delfino Simental   Assessment   Prognosis Fair   Problem List Decreased strength;Decreased range of motion;Decreased endurance; Impaired balance;Decreased mobility; Decreased safety awareness;Pain;Orthopedic restrictions   Assessment Pt was admitted to the hospital on 10/10/21 following a mechanical fall at home  Diagnoses include fall, closed displaced fx of R femoral neck, abnormal finding on CT scan, and HTN  Hemiarthroplasty of R hip performed on 10/10/21  Orders were placed for PT eval/treat, WBAT on the R leg with posterior hip precautions, and up with assistance on 10/10/21  Pt presents with comorbidities of bradycardia, DJD of the lumbar spine, HTN, pacemaker, and history of eye surgery  Personal factors include advanced age, CATHY, limited home support, and positive fall history  Pt presents with pain, weakness, decreased ROM, decreased endurance, impaired balance, decreased safety awareness, and fall risk  These impairments are supported by findings in the physical examination (weakness and decreased ROM), mobility assessment (need for mod Ax1 with supine to sit and bed mobility, max Ax1 for sit to stand, min Ax1 with ambulation with a walker)  Barthel Index: 50/100  Pt is at a risk for falls due to physical and safety awareness deficits  Pt's clinical presentation is unstable/unpredictable (evident in need for mod to max Ax1 assist for bed mobility and standing as well as min Ax1 with ambulation when usually mobilizing independently)  Pt needs inpatient PT treatment to improve mobility deficits and progress mobility training as appropriate  Discharge recommendation is for inpatient rehab to reduce risk for falls and to maximize level of functional independence  Pt would benefit from consult to Gerontology to address age related issues             CT scan showing 7 mm proximal right ureteral calculus just distal to the right UPJ with mild hydronephrosis

## 2023-02-02 NOTE — ED PROVIDER NOTES
History  Chief Complaint   Patient presents with   • Abdominal Pain     Pt reports lower abdominal pain, started at 2pm  Had BM since, with no blood  Denies nausea, vomiting or diarrhea      HPI   80-year-old male patient with a history of hypertension, kidney stones presenting with right lower quadrant pain  Patient states the pain started earlier this afternoon around 2 PM, initially became more severe and constant but it approximately 10:30 PM, patient reports a mild improvement in his symptoms  Denies any fevers, chills, diarrhea  Did have 1 bowel movement later this afternoon which was normal in color and caliber  Admits to some mild nausea, but no vomiting  History of cholecystectomy, but otherwise no abdominal surgeries  Initially worried that his pain was related to a kidney stone as it felt similar, but did not note any blood in the urine  Prior to Admission Medications   Prescriptions Last Dose Informant Patient Reported? Taking?    B Complex Vitamins (VITAMIN B COMPLEX PO) 1/31/2023  Yes Yes   Sig: Take 1 tablet by mouth daily   Cholecalciferol (VITAMIN D PO) 1/31/2023  Yes Yes   Sig: Take 1 capsule by mouth daily    Multiple Vitamins-Minerals (MULTIVITAMIN ADULT PO) 1/31/2023  Yes Yes   Sig: Take 1 tablet by mouth daily   acetaminophen (TYLENOL) 325 mg tablet Unknown  No No   Sig: Take 3 tablets (975 mg total) by mouth every 8 (eight) hours   Patient not taking: Reported on 6/3/2022   amLODIPine (NORVASC) 5 mg tablet 1/31/2023  No Yes   Sig: TAKE 1 TABLET TWICE DAILY   bisacodyl (DULCOLAX) 5 mg EC tablet Unknown  No No   Sig: Take 1 tablet (5 mg total) by mouth daily as needed for constipation   Patient not taking: Reported on 10/6/2022   enoxaparin (LOVENOX) 40 mg/0 4 mL   No No   Sig: Inject 0 4 mL (40 mg total) under the skin daily for 28 days   Patient not taking: Reported on 9/2/2022   gabapentin (NEURONTIN) 100 mg capsule More than a month  No No   Sig: Take 1 capsule (100 mg total) by mouth daily at bedtime   Patient not taking: Reported on 1/27/2022   hydrochlorothiazide (MICROZIDE) 12 5 mg capsule 1/31/2023  No Yes   Sig: TAKE 1 CAPSULE DAILY   metoprolol tartrate (LOPRESSOR) 25 mg tablet 1/31/2023  No Yes   Sig: TAKE 1/2 TABLET EVERY 12 HOURS   nitroglycerin (NITROSTAT) 0 4 mg SL tablet Unknown  No No   Sig: Place 1 tablet (0 4 mg total) under the tongue every 5 (five) minutes as needed for chest pain   Patient not taking: Reported on 9/9/2022   polyethylene glycol (GLYCOLAX) 17 GM/SCOOP powder 1/31/2023  No Yes   Sig: TAKE 17 G (1 CAPFUL) BY MOUTH DAILY AS NEEDED (CONSTIPATION) - MIX WITH LIQUID BEFORE TAKING  polyethylene glycol (MIRALAX) 17 g packet 1/31/2023  Yes Yes   Sig: Take 17 g by mouth daily   potassium chloride (K-DUR,KLOR-CON) 20 mEq tablet 1/31/2023  No Yes   Sig: TAKE 2 TABLETS (40 MEQ TOTAL) BY MOUTH DAILY AS DIRECTED   senna-docusate sodium (SENOKOT S) 8 6-50 mg per tablet Unknown  No No   Sig: Take 1 tablet by mouth 2 (two) times a day   Patient not taking: Reported on 9/9/2022   tamsulosin (FLOMAX) 0 4 mg   No No   Sig: TAKE 1 CAPSULE (0 4 MG TOTAL) BY MOUTH DAILY AT BEDTIME   Patient not taking: No sig reported      Facility-Administered Medications: None       Past Medical History:   Diagnosis Date   • Balanitis 1/2/2020   • Basal cell carcinoma    • Bradycardia    • Degenerative joint disease (DJD) of lumbar spine    • Hypertension    • Kidney stones    • Pacemaker        Past Surgical History:   Procedure Laterality Date   • CARDIAC PACEMAKER PLACEMENT     • CHOLECYSTECTOMY     • EYE SURGERY     • NM HEMIARTHROPLASTY HIP PARTIAL Right 10/10/2021    Procedure: HEMIARTHROPLASTY HIP (BIPOLAR), RIGHT;  Surgeon: Michele Pena MD;  Location: [facility];  Service: Orthopedics   • TONSILLECTOMY         Family History   Problem Relation Age of Onset   • Diabetes Mother    • Heart disease Mother      I have reviewed and agree with the history as documented      E-Cigarette/Vaping • E-Cigarette Use Never User      E-Cigarette/Vaping Substances   • Nicotine No    • THC No    • CBD No    • Flavoring No    • Other No    • Unknown No      Social History     Tobacco Use   • Smoking status: Never   • Smokeless tobacco: Never   Vaping Use   • Vaping Use: Never used   Substance Use Topics   • Alcohol use: Not Currently     Comment: occasionally; Per [employer]  No Alcohol use   • Drug use: No        Review of Systems   Constitutional: Negative for chills and fever  HENT: Negative for ear pain and sore throat  Eyes: Negative for pain and visual disturbance  Respiratory: Negative for cough and shortness of breath  Cardiovascular: Negative for chest pain and palpitations  Gastrointestinal: Positive for abdominal pain  Negative for vomiting  Genitourinary: Negative for dysuria and hematuria  Musculoskeletal: Negative for arthralgias and back pain  Skin: Negative for color change and rash  Neurological: Negative for seizures and syncope  All other systems reviewed and are negative  Physical Exam  ED Triage Vitals   Temperature Pulse Respirations Blood Pressure SpO2   01/31/23 2012 01/31/23 2008 01/31/23 2008 01/31/23 2008 01/31/23 2008   97 8 °F (36 6 °C) (!) 53 18 155/72 95 %      Temp Source Heart Rate Source Patient Position - Orthostatic VS BP Location FiO2 (%)   01/31/23 2012 01/31/23 2008 01/31/23 2315 01/31/23 2330 --   Oral Monitor Lying Right arm       Pain Score       01/31/23 2008       6             Orthostatic Vital Signs  Vitals:    02/01/23 1700 02/01/23 1728 02/01/23 1750 02/01/23 2217   BP: 143/70 147/76 151/73 137/65   Pulse: (!) 52 (!) 49 (!) 52 (!) 53   Patient Position - Orthostatic VS:           Physical Exam  Vitals and nursing note reviewed  Constitutional:       General: He is not in acute distress  Appearance: He is well-developed  HENT:      Head: Normocephalic and atraumatic     Eyes:      Conjunctiva/sclera: Conjunctivae normal  Cardiovascular:      Rate and Rhythm: Normal rate and regular rhythm  Heart sounds: No murmur heard  Pulmonary:      Effort: Pulmonary effort is normal  No respiratory distress  Breath sounds: Normal breath sounds  Abdominal:      Palpations: Abdomen is soft  Tenderness: There is no abdominal tenderness  There is no right CVA tenderness or left CVA tenderness  Musculoskeletal:         General: No swelling  Cervical back: Neck supple  Skin:     General: Skin is warm and dry  Capillary Refill: Capillary refill takes less than 2 seconds  Neurological:      Mental Status: He is alert     Psychiatric:         Mood and Affect: Mood normal          ED Medications  Medications   amLODIPine (NORVASC) tablet 5 mg (5 mg Oral Given 2/1/23 1907)   metoprolol tartrate (LOPRESSOR) partial tablet 12 5 mg (12 5 mg Oral Given 2/1/23 1907)   heparin (porcine) subcutaneous injection 5,000 Units (5,000 Units Subcutaneous Given 2/1/23 2140)   acetaminophen (TYLENOL) tablet 650 mg (has no administration in time range)   phenazopyridine (PYRIDIUM) tablet 100 mg (has no administration in time range)   oxybutynin (DITROPAN) tablet 5 mg (has no administration in time range)   iohexol (OMNIPAQUE) 350 MG/ML injection (SINGLE-DOSE) 100 mL (100 mL Intravenous Given 2/1/23 0029)   sodium chloride 0 9 % infusion (0 mL/hr Intravenous Stopped 2/1/23 0825)   lactated ringers infusion (75 mL/hr Intravenous New Bag 2/1/23 1910)       Diagnostic Studies  Results Reviewed     Procedure Component Value Units Date/Time    HS Troponin I 4hr [[id number]]  (Normal) Collected: 02/01/23 1114    Lab Status: Final result Specimen: Blood from Arm, Right Updated: 02/01/23 1207     hs TnI 4hr 7 ng/L      Delta 4hr hsTnI -2 ng/L     HS Troponin I 2hr [[id number]]  (Normal) Collected: 02/01/23 0818    Lab Status: Final result Specimen: Blood from Arm, Right Updated: 02/01/23 0912     hs TnI 2hr 9 ng/L      Delta 2hr hsTnI 0 ng/L     HS Troponin 0hr (reflex protocol) [[id number]]  (Normal) Collected: 02/01/23 0518    Lab Status: Final result Specimen: Blood from Arm, Right Updated: 02/01/23 0635     hs TnI 0hr 9 ng/L     Basic metabolic panel [[id number]]  (Abnormal) Collected: 02/01/23 0518    Lab Status: Final result Specimen: Blood from Arm, Right Updated: 02/01/23 8581     Sodium 139 mmol/L      Potassium 3 1 mmol/L      Chloride 106 mmol/L      CO2 26 mmol/L      ANION GAP 7 mmol/L      BUN 18 mg/dL      Creatinine 0 62 mg/dL      Glucose 97 mg/dL      Calcium 8 8 mg/dL      eGFR 89 ml/min/1 73sq m     Narrative:      [place] guidelines for Chronic Kidney Disease (CKD):   •  Stage 1 with normal or high GFR (GFR > 90 mL/min/1 73 square meters)  •  Stage 2 Mild CKD (GFR = 60-89 mL/min/1 73 square meters)  •  Stage 3A Moderate CKD (GFR = 45-59 mL/min/1 73 square meters)  •  Stage 3B Moderate CKD (GFR = 30-44 mL/min/1 73 square meters)  •  Stage 4 Severe CKD (GFR = 15-29 mL/min/1 73 square meters)  •  Stage 5 End Stage CKD (GFR <15 mL/min/1 73 square meters)  Note: GFR calculation is accurate only with a steady state creatinine    Magnesium [[id number]]  (Abnormal) Collected: 02/01/23 0518    Lab Status: Final result Specimen: Blood from Arm, Right Updated: 02/01/23 0620     Magnesium 1 8 mg/dL     CBC and differential [[id number]]  (Abnormal) Collected: 02/01/23 0518    Lab Status: Final result Specimen: Blood from Arm, Right Updated: 02/01/23 0551     WBC 8 38 Thousand/uL      RBC 3 70 Million/uL      Hemoglobin 12 6 g/dL      Hematocrit 36 8 %       fL      MCH 34 1 pg      MCHC 34 2 g/dL      RDW 13 3 %      MPV 11 4 fL      Platelets 849 Thousands/uL      nRBC 0 /100 WBCs      Neutrophils Relative 63 %      Immat GRANS % 1 %      Lymphocytes Relative 21 %      Monocytes Relative 10 %      Eosinophils Relative 4 %      Basophils Relative 1 %      Neutrophils Absolute 5 39 Thousands/µL      Immature Grans Absolute 0 04 Thousand/uL      Lymphocytes Absolute 1 76 Thousands/µL      Monocytes Absolute 0 83 Thousand/µL      Eosinophils Absolute 0 31 Thousand/µL      Basophils Absolute 0 05 Thousands/µL     Platelet count [[id number]]  (Normal) Collected: 02/01/23 0518    Lab Status: Final result Specimen: Blood from Arm, Right Updated: 02/01/23 0551     Platelets 366 Thousands/uL      MPV 11 4 fL     UA w Reflex to Microscopic w Reflex to Culture [[id number]] Collected: 02/01/23 0450    Lab Status: Final result Specimen: Urine, Clean Catch Updated: 02/01/23 0508     Color, UA Light Yellow     Clarity, UA Clear     Specific Gravity, UA 1 024     pH, UA 6 5     Leukocytes, UA Negative     Nitrite, UA Negative     Protein, UA Negative mg/dl      Glucose, UA Negative mg/dl      Ketones, UA Negative mg/dl      Urobilinogen, UA <2 0 mg/dl      Bilirubin, UA Negative     Occult Blood, UA Negative    Lipase [[id number]]  (Normal) Collected: 01/31/23 2019    Lab Status: Final result Specimen: Blood from Arm, Right Updated: 01/31/23 2049     Lipase 14 u/L     Comprehensive metabolic panel [[id number]]  (Abnormal) Collected: 01/31/23 2019    Lab Status: Final result Specimen: Blood from Arm, Right Updated: 01/31/23 2049     Sodium 138 mmol/L      Potassium 3 8 mmol/L      Chloride 105 mmol/L      CO2 25 mmol/L      ANION GAP 8 mmol/L      BUN 23 mg/dL      Creatinine 0 69 mg/dL      Glucose 151 mg/dL      Calcium 9 1 mg/dL      AST 25 U/L      ALT 23 U/L      Alkaline Phosphatase 55 U/L      Total Protein 6 7 g/dL      Albumin 3 8 g/dL      Total Bilirubin 0 58 mg/dL      eGFR 85 ml/min/1 73sq m     Narrative:      [place] guidelines for Chronic Kidney Disease (CKD):   •  Stage 1 with normal or high GFR (GFR > 90 mL/min/1 73 square meters)  •  Stage 2 Mild CKD (GFR = 60-89 mL/min/1 73 square meters)  •  Stage 3A Moderate CKD (GFR = 45-59 mL/min/1 73 square meters)  •  Stage 3B Moderate CKD (GFR = 30-44 mL/min/1 73 square meters)  •  Stage 4 Severe CKD (GFR = 15-29 mL/min/1 73 square meters)  •  Stage 5 End Stage CKD (GFR <15 mL/min/1 73 square meters)  Note: GFR calculation is accurate only with a steady state creatinine    CBC and differential [[id number]]  (Abnormal) Collected: 01/31/23 2019    Lab Status: Final result Specimen: Blood from Arm, Right Updated: 01/31/23 2029     WBC 11 87 Thousand/uL      RBC 4 17 Million/uL      Hemoglobin 14 2 g/dL      Hematocrit 41 4 %      MCV 99 fL      MCH 34 1 pg      MCHC 34 3 g/dL      RDW 13 3 %      MPV 11 0 fL      Platelets 627 Thousands/uL      nRBC 0 /100 WBCs      Neutrophils Relative 82 %      Immat GRANS % 1 %      Lymphocytes Relative 9 %      Monocytes Relative 6 %      Eosinophils Relative 1 %      Basophils Relative 1 %      Neutrophils Absolute 9 95 Thousands/µL      Immature Grans Absolute 0 06 Thousand/uL      Lymphocytes Absolute 1 01 Thousands/µL      Monocytes Absolute 0 71 Thousand/µL      Eosinophils Absolute 0 08 Thousand/µL      Basophils Absolute 0 06 Thousands/µL                  CT abdomen pelvis with contrast   ED Interpretation by Stephanie Dickey DO (02/01 0241)   From [facility]:     Impression:   1  Mild right hydronephrosis to a 1 0 x 0 8 cm stone in proximal right ureter  2  Urothelial enhancement of the R renal pelvis and proximal right ureter may indicate an upper R urinary tract infection   3  Mild bladder wall thickening may be related to underdistention or cystitis  4  At least 2 punctuate nonobstructive renal stones bilaterally  No left hydronephrosis  : Clarence Lilly MD      Final Result by Tyler Keene MD (02/01 6458)      7 mm proximal right ureteral calculus just distal to the right UPJ with mild right hydronephrosis  Associated urothelial enhancement suggesting potential upper tract infection  Punctate bilateral nonobstructing renal calculi              Workstation performed: [id number]         [facility] retrograde pyelogram    (Results Pending)         Procedures  Procedures      ED Course                                       Medical Decision Making  42-year-old male patient presenting with right lower quadrant abdominal pain that has improved over the last 2 hours  On evaluation, vitals were normal and the patient was in no acute distress  However, given patient's age and previous history of renal stones did determine he would be appropriate for more in-depth work-up  Basic labs revealed mild hypokalemia, mild hypomagnesemia, mild leukocytosis  Urinalysis showed no evidence of acute infection  CT scan of the abdomen and pelvis was done and showed large, 10 mm x 8 mm obstructing renal stone with hydroureteronephrosis  At that time, nephrology was contacted and the case was discussed  It was recommended the patient be admitted for lithotripsy and possible stenting  Findings were discussed with the patient who was in agreement with plan and amenable to admission for procedure  At time of admission, patient was in stable condition  [facility] was contacted who accepted the patient with urology consult for procedure  Hydronephrosis: acute illness or injury  Nephrolithiasis: acute illness or injury  Amount and/or Complexity of Data Reviewed  Labs: ordered  Radiology: ordered and independent interpretation performed  Risk  Prescription drug management  Decision regarding hospitalization              Disposition  Final diagnoses:   Nephrolithiasis   Hydronephrosis     Time reflects when diagnosis was documented in both MDM as applicable and the Disposition within this note     Time User Action Codes Description Comment    2/1/2023  3:07 AM Tess Lissett Add [N20 0] Nephrolithiasis     2/1/2023  3:08 AM Tess Monks Add [N13 30] Hydronephrosis     2/1/2023  4:51 AM Celestine CORTEZ Add [N13 2] Hydronephrosis with ureteral calculus     2/1/2023  4:20 PM Michael Hand Modify [N20 0] Nephrolithiasis ED Disposition     ED Disposition   Admit    Condition   Stable    Date/Time   Wed Feb 1, 2023  3:07 AM    Comment   Case was discussed with on-call urology PA and Vanessa Childress, and the patient's admission status was agreed to be inpatient to the service of Dr Cabello Part   Follow-up Information    None         Current Discharge Medication List      CONTINUE these medications which have NOT CHANGED    Details   amLODIPine (NORVASC) 5 mg tablet TAKE 1 TABLET TWICE DAILY  Qty: 60 tablet, Refills: 10    Associated Diagnoses: Aortic valve disease      B Complex Vitamins (VITAMIN B COMPLEX PO) Take 1 tablet by mouth daily      Cholecalciferol (VITAMIN D PO) Take 1 capsule by mouth daily       hydrochlorothiazide (MICROZIDE) 12 5 mg capsule TAKE 1 CAPSULE DAILY  Qty: 30 capsule, Refills: 0    Associated Diagnoses: Essential (primary) hypertension      metoprolol tartrate (LOPRESSOR) 25 mg tablet TAKE 1/2 TABLET EVERY 12 HOURS  Qty: 30 tablet, Refills: 11    Associated Diagnoses: SVT (supraventricular tachycardia) (HCC)      Multiple Vitamins-Minerals (MULTIVITAMIN ADULT PO) Take 1 tablet by mouth daily      polyethylene glycol (GLYCOLAX) 17 GM/SCOOP powder TAKE 17 G (1 CAPFUL) BY MOUTH DAILY AS NEEDED (CONSTIPATION) - MIX WITH LIQUID BEFORE TAKING    Qty: 510 g, Refills: 5    Associated Diagnoses: Constipation      polyethylene glycol (MIRALAX) 17 g packet Take 17 g by mouth daily      potassium chloride (K-DUR,KLOR-CON) 20 mEq tablet TAKE 2 TABLETS (40 MEQ TOTAL) BY MOUTH DAILY AS DIRECTED  Qty: 60 tablet, Refills: 0    Associated Diagnoses: Essential (primary) hypertension      acetaminophen (TYLENOL) 325 mg tablet Take 3 tablets (975 mg total) by mouth every 8 (eight) hours  Refills: 0    Associated Diagnoses: Closed displaced fracture of right femoral neck (HCC)      bisacodyl (DULCOLAX) 5 mg EC tablet Take 1 tablet (5 mg total) by mouth daily as needed for constipation  Qty: 10 tablet, Refills: 0    Associated Diagnoses: Constipation      enoxaparin (LOVENOX) 40 mg/0 4 mL Inject 0 4 mL (40 mg total) under the skin daily for 28 days  Qty: 11 2 mL, Refills: 0    Associated Diagnoses: Closed displaced fracture of right femoral neck (HCC)      gabapentin (NEURONTIN) 100 mg capsule Take 1 capsule (100 mg total) by mouth daily at bedtime  Refills: 0    Associated Diagnoses: Closed displaced fracture of right femoral neck (HCC)      nitroglycerin (NITROSTAT) 0 4 mg SL tablet Place 1 tablet (0 4 mg total) under the tongue every 5 (five) minutes as needed for chest pain  Qty: 25 tablet, Refills: 2    Associated Diagnoses: SVT (supraventricular tachycardia) (HCC)      senna-docusate sodium (SENOKOT S) 8 6-50 mg per tablet Take 1 tablet by mouth 2 (two) times a day  Refills: 0    Associated Diagnoses: Closed displaced fracture of right femoral neck (HCC)      tamsulosin (FLOMAX) 0 4 mg TAKE 1 CAPSULE (0 4 MG TOTAL) BY MOUTH DAILY AT BEDTIME  Qty: 30 capsule, Refills: 5    Associated Diagnoses: Nocturia           No discharge procedures on file  PDMP Review       Value Time User    PDMP Reviewed  Yes 10/10/2021  8:36 AM Eboni Coello PA-C           ED Provider  Attending physically available and evaluated Simi Leahy I managed the patient along with the ED Attending      Electronically Signed by         Pepe Andrews DO  02/02/23 6979

## 2023-02-03 LAB — BACTERIA UR CULT: NORMAL

## 2023-02-03 NOTE — TELEPHONE ENCOUNTER
Called and spoke to patient  Patient is doing well after procedure  Patient was reviewed what to expect with stent in place while admitted and has no current questions  Patient stated he was told he would get a call from us regarding next surgery  Informed patient that is correct  Patient stated if he doesn't answer you can leave a detailed message with date and a phone number he can call back  Informed patient I would inform surgery scheduler  Patient was thankful for call and verbalized understanding

## 2023-02-06 NOTE — UTILIZATION REVIEW
NOTIFICATION OF ADMISSION DISCHARGE   This is a Notification of Discharge from [facility]  Please be advised that this patient has been discharge from our facility  Below you will find the admission and discharge date and time including the patient’s disposition  UTILIZATION REVIEW CONTACT:  Brittny Gutierrez  Utilization   Network Utilization Review Department  Phone: [phone] x carefully listen to the prompts  All voicemails are confidential   Email: [email or web address]     ADMISSION INFORMATION  PRESENTATION DATE: 1/31/2023  8:05 PM  OBERVATION ADMISSION DATE:   INPATIENT ADMISSION DATE: 2/1/23  3:08 AM   DISCHARGE DATE: 2/2/2023  4:22 PM   DISPOSITION:Home/Self Care    IMPORTANT INFORMATION:  Send all requests for admission clinical reviews, approved or denied determinations and any other requests to dedicated fax number below belonging to the campus where the patient is receiving treatment   List of dedicated fax numbers:  [address] DENIALS (Administrative/Medical Necessity) [phone]   [address] (Maternity/NICU/Pediatrics) [phone]   [facility] [phone]   [place]  [phone]   [address] [phone]   220 [facility] [phone]   90 [facility] [phone]   1466 [facility] 119 [phone]   [facility]  [phone]   [address] [facility] [phone]   [address] [facility] [address] [facility] [phone]

## 2023-02-09 ENCOUNTER — PREP FOR PROCEDURE (OUTPATIENT)
Dept: UROLOGY | Facility: CLINIC | Age: 88
End: 2023-02-09

## 2023-02-09 DIAGNOSIS — N13.2 HYDRONEPHROSIS WITH URETERAL CALCULUS: Primary | ICD-10-CM

## 2023-02-09 DIAGNOSIS — R39.89 SUSPECTED UTI: ICD-10-CM

## 2023-02-09 NOTE — TELEPHONE ENCOUNTER
Spoke to pt for scheduling procedure  Due to transportation issues, pt preferred to wait until Tulio Bermudez 27 date available which, is not until 4/11  Scheduled and mailed all instructions and paperwork

## 2023-02-10 ENCOUNTER — OFFICE VISIT (OUTPATIENT)
Dept: DERMATOLOGY | Facility: CLINIC | Age: 88
End: 2023-02-10

## 2023-02-10 VITALS — HEIGHT: 73 IN | WEIGHT: 204 LBS | TEMPERATURE: 97.8 F | BODY MASS INDEX: 27.04 KG/M2

## 2023-02-10 DIAGNOSIS — D48.5 NEOPLASM OF UNCERTAIN BEHAVIOR OF SKIN: Primary | ICD-10-CM

## 2023-02-10 PROCEDURE — 81261 IGH GENE REARRANGE AMP METH: CPT | Performed by: STUDENT IN AN ORGANIZED HEALTH CARE EDUCATION/TRAINING PROGRAM

## 2023-02-10 PROCEDURE — 81340 TRB@ GENE REARRANGE AMPLIFY: CPT | Performed by: STUDENT IN AN ORGANIZED HEALTH CARE EDUCATION/TRAINING PROGRAM

## 2023-02-10 PROCEDURE — 88305 TISSUE EXAM BY PATHOLOGIST: CPT | Performed by: STUDENT IN AN ORGANIZED HEALTH CARE EDUCATION/TRAINING PROGRAM

## 2023-02-10 PROCEDURE — 81342 TRG GENE REARRANGEMENT ANAL: CPT | Performed by: STUDENT IN AN ORGANIZED HEALTH CARE EDUCATION/TRAINING PROGRAM

## 2023-02-10 PROCEDURE — 88365 INSITU HYBRIDIZATION (FISH): CPT | Performed by: STUDENT IN AN ORGANIZED HEALTH CARE EDUCATION/TRAINING PROGRAM

## 2023-02-10 PROCEDURE — 88313 SPECIAL STAINS GROUP 2: CPT | Performed by: STUDENT IN AN ORGANIZED HEALTH CARE EDUCATION/TRAINING PROGRAM

## 2023-02-10 PROCEDURE — 88342 IMHCHEM/IMCYTCHM 1ST ANTB: CPT | Performed by: STUDENT IN AN ORGANIZED HEALTH CARE EDUCATION/TRAINING PROGRAM

## 2023-02-10 PROCEDURE — 88364 INSITU HYBRIDIZATION (FISH): CPT | Performed by: STUDENT IN AN ORGANIZED HEALTH CARE EDUCATION/TRAINING PROGRAM

## 2023-02-10 PROCEDURE — 88341 IMHCHEM/IMCYTCHM EA ADD ANTB: CPT | Performed by: STUDENT IN AN ORGANIZED HEALTH CARE EDUCATION/TRAINING PROGRAM

## 2023-02-10 RX ORDER — TRIAMCINOLONE ACETONIDE 1 MG/G
CREAM TOPICAL
Qty: 80 G | Refills: 0 | Status: SHIPPED | OUTPATIENT
Start: 2023-02-10

## 2023-02-10 NOTE — PROGRESS NOTES
[facility] Note     Patient Name: Sparkle Hale  Encounter Date: 2/10/23     Have you been cared for by a [place] [address] Dermatologist in the last 3 years and, if so, which description applies to you? Yes  I have been here within the last 3 years, and my medical history has NOT changed since that time  I am MALE/not capable of bearing children  REVIEW OF SYSTEMS:  Have you recently had or currently have any of the following? · No changes in my recent health  PAST MEDICAL HISTORY:  Have you personally ever had or currently have any of the following? If "YES," then please provide more detail  · No changes in my medical history  FAMILY HISTORY:  Any "first degree relatives" (parent, brother, sister, or child) with the following? • No changes in my family's known health  PATIENT EXPERIENCE:    • Do you want the Dermatologist to perform a COMPLETE skin exam today including a clinical examination under the "bra and underwear" areas? NO  • If necessary, do we have your permission to call and leave a detailed message on your Preferred Phone number that includes your specific medical information?   Yes      No Known Allergies   Current Outpatient Medications:   •  acetaminophen (TYLENOL) 325 mg tablet, Take 3 tablets (975 mg total) by mouth every 8 (eight) hours, Disp: , Rfl: 0  •  amLODIPine (NORVASC) 5 mg tablet, TAKE 1 TABLET TWICE DAILY, Disp: 60 tablet, Rfl: 10  •  B Complex Vitamins (VITAMIN B COMPLEX PO), Take 1 tablet by mouth daily, Disp: , Rfl:   •  Cholecalciferol (VITAMIN D PO), Take 1 capsule by mouth daily , Disp: , Rfl:   •  hydrochlorothiazide (MICROZIDE) 12 5 mg capsule, TAKE 1 CAPSULE DAILY, Disp: 90 capsule, Rfl: 3  •  metoprolol tartrate (LOPRESSOR) 25 mg tablet, TAKE 1/2 TABLET EVERY 12 HOURS, Disp: 90 tablet, Rfl: 3  •  oxybutynin (DITROPAN) 5 mg tablet, Take 1 tablet (5 mg total) by mouth 3 (three) times a day as needed (Bladder spasms), Disp: 30 tablet, Rfl: 0  • potassium chloride (K-DUR,KLOR-CON) 20 mEq tablet, TAKE 2 TABLETS (40 MEQ TOTAL) BY MOUTH DAILY AS DIRECTED, Disp: 180 tablet, Rfl: 3  •  senna-docusate sodium (SENOKOT S) 8 6-50 mg per tablet, Take 1 tablet by mouth 2 (two) times a day, Disp: , Rfl: 0  •  tamsulosin (FLOMAX) 0 4 mg, TAKE 1 CAPSULE (0 4 MG TOTAL) BY MOUTH DAILY AT BEDTIME, Disp: 30 capsule, Rfl: 5          • Whom besides the patient is providing clinical information about today's encounter?   o NO ADDITIONAL HISTORIAN (patient alone provided history)    Physical Exam and Assessment/Plan by Diagnosis:    NEOPLASM OF UNCERTAIN BEHAVIOR OF SKIN    Physical Exam:  • (Anatomic Location); (Size and Morphological Description); (Differential Diagnosis):  o A: left scalp; punch biopsy; 80 y o  with erythematous papules and nodules, previous biopsy demonstrated atypical lymphoid infiltrate  Case [id number]   o B: right elbow; shave biopsy; 2 0 cm x 1 5 cm pink plaque; rule out squamous cell carcinoma   • Pertinent Positives:  • Pertinent Negatives: Additional History of Present Condition:  Patient presents for recheck on biopsies     Assessment and Plan:  • I have discussed with the patient that a sample of skin via a "skin biopsy” would be potentially helpful to further make a specific diagnosis under the microscope  • Based on a thorough discussion of this condition and the management approach to it (including a comprehensive discussion of the known risks, side effects and potential benefits of treatment), the patient (family) agrees to implement the following specific plan:    o Procedure:  Skin Biopsy  After a thorough discussion of treatment options and risk/benefits/alternatives (including but not limited to local pain, scarring, dyspigmentation, blistering, possible superinfection, and inability to confirm a diagnosis via histopathology), verbal and written consent were obtained and portion of the rash was biopsied for tissue sample    See below for consent that was obtained from patient and subsequent Procedure Note   o Recommend repeat biopsy on scalp for additional sampling  Previous biopsy demonstrated crush artifact and was adequate only for superficial sampling   o Will start triamcinolone 0 1% cream  Apply twice a day to scalp for 1 week      PROCEDURE NOTE:  PUNCH BIOPSY: A      Performing Physician: Dr Leach    Anatomic Location; Clinical Description with size (cm); Pre-Op Diagnosis:   •  A: left scalp; punch biopsy; 80 y o  with erythematous papules and nodules, previous biopsy demonstrated atypical lymphoid infiltrate  Case [id number]        Anesthesia: 1% Lidocaine HCL      Topical anesthesia: None       Indications: To indicate diagnosis and management plan  Procedure Details     Patient informed of the risks (including bleeding,scaring and infection) and benefits of the procedure explained  Verbal and written informed consent obtained  The area was prepped and draped in the usual fashion  Anesthesia was obtained with 1% lidocaine with epinephrine  The skin was then stretched perpendicular to the skin tension lines and a punch biopsy to an appropriate sampling depth was obtained with a 4 mm punch with a forceps and iris scissors  Hemostasis was obtained with 4-0 Ethilon x 2 sutures  Complications:  None      Specimen has been sent for review by Dermatopathology  Plan:  1  Instructed to keep the wound dry and covered for 24-48h and clean thereafter  2  Warning signs of infection were reviewed  3  Recommended that the patient use acetaminophen as needed for pain  4  Sutures if any should be removed in 7 days      Standard post-procedure care has been explained and has been included in written form within the patient's copy of Informed Consent      PROCEDURE TANGENTIAL (SHAVE) BIOPSY NOTE: B    • Performing Physician: Dr Leach  • Anatomic Location; Clinical Description with size (cm); Pre-Op Diagnosis:   o B: right elbow; shave biopsy; 2 0 cm x 1 5 cm pink plaque; rule out squamous cell carcinoma   • Post-op diagnosis: Same     • Local anesthesia: 1% Lidocaine HCL     • Topical anesthesia: None    • Hemostasis: Aluminum chloride       After obtaining informed consent  at which time there was a discussion about the purpose of biopsy  and low risks of infection and bleeding  The area was prepped and draped in the usual fashion  Anesthesia was obtained with 1% lidocaine with epinephrine  A shave biopsy to an appropriate sampling depth was obtained by Shave (Dermablade or 15 blade) The resulting wound was covered with surgical ointment and bandaged appropriately  The patient tolerated the procedure well without complications and was without signs of functional compromise  Specimen has been sent for review by Dermatopathology  Standard post-procedure care has been explained and has been included in written form within the patient's copy of Informed Consent  INFORMED CONSENT DISCUSSION AND POST-OPERATIVE INSTRUCTIONS FOR PATIENT    I   RATIONALE FOR PROCEDURE  I understand that a skin biopsy allows the Dermatologist to test a lesion or rash under the microscope to obtain a diagnosis  It usually involves numbing the area with numbing medication and removing a small piece of skin; sometimes the area will be closed with sutures  In this specific procedure, sutures are not usually needed  If any sutures are placed, then they are usually need to be removed in 2 weeks or less  I understand that my Dermatologist recommends that a skin "shave" biopsy be performed today  A local anesthetic, similar to the kind that a dentist uses when filling a cavity, will be injected with a very small needle into the skin area to be sampled  The injected skin and tissue underneath "will go to sleep” and become numb so no pain should be felt afterwards    An instrument shaped like a tiny "razor blade" (shave biopsy instrument) will be used to cut a small piece of tissue and skin from the area so that a sample of tissue can be taken and examined more closely under the microscope  A slight amount of bleeding will occur, but it will be stopped with direct pressure and a pressure bandage and any other appropriate methods  I understands that a scar will form where the wound was created  Surgical ointment will be applied to help protect the wound  Sutures are not usually needed  II   RISKS AND POTENTIAL COMPLICATIONS   I understand the risks and potential complications of a skin biopsy include but are not limited to the following:  • Bleeding  • Infection  • Pain  • Scar/keloid  • Skin discoloration  • Incomplete Removal  • Recurrence  • Nerve Damage/Numbness/Loss of Function  • Allergic Reaction to Anesthesia  • Biopsies are diagnostic procedures and based on findings additional treatment or evaluation may be required  • Loss or destruction of specimen resulting in no additional findings    My Dermatologist has explained to me the nature of the condition, the nature of the procedure, and the benefits to be reasonably expected compared with alternative approaches  My Dermatologist has discussed the likelihood of major risks or complications of this procedure including the specific risks listed above, such as bleeding, infection, and scarring/keloid  I understand that a scar is expected after this procedure  I understand that my physician cannot predict if the scar will form a "keloid," which extends beyond the borders of the wound that is created  A keloid is a thick, painful, and bumpy scar  A keloid can be difficult to treat, as it does not always respond well to therapy, which includes injecting cortisone directly into the keloid every few weeks  While this usually reduces the pain and size of the scar, it does not eliminate it  I understand that photographs may be taken before and after the procedure    These will be maintained as part of the medical providers confidential records and may not be made available to me  I further authorize the medical provider to use the photographs for teaching purposes or to illustrate scientific papers, books, or lectures if in his/her judgment, medical research, education, or science may benefit from its use  I have had an opportunity to fully inquire about the risks and benefits of this procedure and its alternatives  I have been given ample time and opportunity to ask questions and to seek a second opinion if I wished to do so  I acknowledge that there have specifically been no guarantees as to the cosmetic results from the procedure  I am aware that with any procedure there is always the possibility of an unexpected complication  III  POST-PROCEDURAL CARE (WHAT YOU WILL NEED TO DO "AFTER THE BIOPSY" TO OPTIMIZE HEALING)    • Keep the area clean and dry  Try NOT to remove the bandage or get it wet for the first 24 hours  • Gently clean the area and apply surgical ointment (such as Vaseline petrolatum ointment, which is available "over the counter" and not a prescription) to the biopsy site for up to 2 weeks straight  This acts to protect the wound from the outside world  • Sutures are not usually placed in this procedure  If any sutures were placed, return for suture removal as instructed (generally 1 week for the face, 2 weeks for the body)  • Take Acetaminophen (Tylenol) for discomfort, if no contraindications  Ibuprofen or aspirin could make bleeding worse  • Call our office immediately for signs of infection: fever, chills, increased redness, warmth, tenderness, discomfort/pain, or pus or foul smell coming from the wound  WHAT TO DO IF THERE IS ANY BLEEDING? If a small amount of bleeding is noticed, place a clean cloth over the area and apply firm pressure for ten minutes  Check the wound after 10 minutes of direct pressure    If bleeding persists, try one more time for an additional 10 minutes of direct pressure on the area  If the bleeding becomes heavier or does not stop after the second attempt, or if you have any other questions about this procedure, then please call your [facility] by calling [phone] (SKIN)  I hereby acknowledge that I have reviewed and verified the site with my Dermatologist and have requested and authorized my Dermatologist to proceed with the procedure        Scribe Attestation    I,:  Jayasrhee Sanford am acting as a scribe while in the presence of the attending physician :       I,:  Mary Cardona MD personally performed the services described in this documentation    as scribed in my presence :

## 2023-03-21 ENCOUNTER — REMOTE DEVICE CLINIC VISIT (OUTPATIENT)
Dept: CARDIOLOGY CLINIC | Facility: CLINIC | Age: 88
End: 2023-03-21

## 2023-03-21 DIAGNOSIS — Z95.0 PRESENCE OF PERMANENT CARDIAC PACEMAKER: Primary | ICD-10-CM

## 2023-03-21 NOTE — PROGRESS NOTES
BSC-DUAL CHAMBER PPM (DDD MODE)/ NOT MRI CONDITIONAL   LATITUDE TRANSMISSION:  BATTERY VOLTAGE ADEQUATE (2 YR )   AP 25%  65% (>40%/AVB)   ALL LEAD PARAMETERS WITHIN NORMAL LIMITS   1 NON-SUSTAINED EPISODE ON 2/11/23; EGM SHOWS NSVT (V-26 - 4 @ 152 BPM)   6 NON-SUSTAINED EPISODES LISTED ON 2/2/23 WITH 1 AVAILABLE EGM SHOWING PAT (V-25 - PAT @ 158 BPM)   EF 60% (ECHO 08/2021)   PATIENT TAKES METOPROLOL   TASK TO DR Tanisha Lazaro FOR REVIEW   NORMAL DEVICE FUNCTION   RG

## 2023-03-27 ENCOUNTER — OFFICE VISIT (OUTPATIENT)
Dept: UROLOGY | Facility: CLINIC | Age: 88
End: 2023-03-27

## 2023-03-27 ENCOUNTER — TELEPHONE (OUTPATIENT)
Dept: UROLOGY | Facility: CLINIC | Age: 88
End: 2023-03-27

## 2023-03-27 VITALS
DIASTOLIC BLOOD PRESSURE: 80 MMHG | HEIGHT: 73 IN | WEIGHT: 208 LBS | SYSTOLIC BLOOD PRESSURE: 130 MMHG | BODY MASS INDEX: 27.57 KG/M2 | HEART RATE: 59 BPM | OXYGEN SATURATION: 97 %

## 2023-03-27 DIAGNOSIS — N20.1 URETER, CALCULUS: Primary | ICD-10-CM

## 2023-03-27 NOTE — PROGRESS NOTES
HISTORY AND PHYSICAL       Patient Identifiers: Bakari Medeiros (MRN: [id number])    Urology, Nayan Lima PA-C  Date of Service: 3/27/2023    History of Present Illness:     Bakari Medeiros is a 80 y o  male seen in the hospital in February with a 7 mm proximal right ureter calculus with hydronephrosis  He had a stent placed February 1 and now returns for definitive treatment  He is scheduled for ureteroscopy on April 11     Urine culture to be obtained today      Past Medical, Past Surgical History:     Past Medical History:   Diagnosis Date   • Balanitis 1/2/2020   • Basal cell carcinoma    • Bradycardia    • Degenerative joint disease (DJD) of lumbar spine    • Hypertension    • Kidney stones    • Pacemaker    :    Past Surgical History:   Procedure Laterality Date   • CARDIAC PACEMAKER PLACEMENT     • CHOLECYSTECTOMY     • EYE SURGERY     • FL RETROGRADE PYELOGRAM  2/1/2023   • NM CYSTO BLADDER W/URETERAL CATHETERIZATION Right 2/1/2023    Procedure: CYSTOSCOPY RETROGRADE PYELOGRAM WITH INSERTION STENT URETERAL;  Surgeon: Angela Camacho MD;  [facility];  Service: Urology   • NM HEMIARTHROPLASTY HIP PARTIAL Right 10/10/2021    Procedure: HEMIARTHROPLASTY HIP (BIPOLAR), RIGHT;  Surgeon: Svetlana Ferguson MD;  Location[facility];  Service: Orthopedics   • TONSILLECTOMY     :    Medications, Allergies:     Current Outpatient Medications:   •  acetaminophen (TYLENOL) 325 mg tablet, Take 3 tablets (975 mg total) by mouth every 8 (eight) hours, Disp: , Rfl: 0  •  amLODIPine (NORVASC) 5 mg tablet, TAKE 1 TABLET TWICE DAILY, Disp: 60 tablet, Rfl: 10  •  B Complex Vitamins (VITAMIN B COMPLEX PO), Take 1 tablet by mouth daily, Disp: , Rfl:   •  Cholecalciferol (VITAMIN D PO), Take 1 capsule by mouth daily , Disp: , Rfl:   •  hydrochlorothiazide (MICROZIDE) 12 5 mg capsule, TAKE 1 CAPSULE DAILY, Disp: 90 capsule, Rfl: 3  •  metoprolol tartrate (LOPRESSOR) 25 mg tablet, TAKE 1/2 TABLET EVERY 12 HOURS, Disp: 90 tablet, Rfl: 3  •  oxybutynin (DITROPAN) 5 mg tablet, Take 1 tablet (5 mg total) by mouth 3 (three) times a day as needed (Bladder spasms) (Patient not taking: Reported on 2/10/2023), Disp: 30 tablet, Rfl: 0  •  potassium chloride (K-DUR,KLOR-CON) 20 mEq tablet, TAKE 2 TABLETS (40 MEQ TOTAL) BY MOUTH DAILY AS DIRECTED, Disp: 180 tablet, Rfl: 3  •  senna-docusate sodium (SENOKOT S) 8 6-50 mg per tablet, Take 1 tablet by mouth 2 (two) times a day, Disp: , Rfl: 0  •  tamsulosin (FLOMAX) 0 4 mg, TAKE 1 CAPSULE (0 4 MG TOTAL) BY MOUTH DAILY AT BEDTIME (Patient not taking: Reported on 2/10/2023), Disp: 30 capsule, Rfl: 5  •  triamcinolone (KENALOG) 0 1 % cream, Apply topically twice daily for 1 week to scalp, Disp: 80 g, Rfl: 0    Allergies:  No Known Allergies:    Social and Family History:   Social History:   Social History     Tobacco Use   • Smoking status: Never   • Smokeless tobacco: Never   Vaping Use   • Vaping Use: Never used   Substance Use Topics   • Alcohol use: Not Currently     Comment: occasionally; Per [employer]  No Alcohol use   • Drug use: No        Social History     Tobacco Use   Smoking Status Never   Smokeless Tobacco Never       Family History:  Family History   Problem Relation Age of Onset   • Diabetes Mother    • Heart disease Mother    :     Review of Systems:     General: Fever, chills, or night sweats: none  Cardiac: Negative for chest pain  Pulmonary: Negative for shortness of breath  Gastrointestinal: Abdominal pain none  Nausea, vomiting, or diarrhea none,  Genitourinary: See HPI above  Patient does not have hematuria  All other systems queried were negative  Physical Exam:   General: Patient is pleasant and in NAD  Awake and alert  There were no vitals taken for this visit  Cardiac: regular rate  Peripheral edema: none  Pulmonary: Non-labored breathing lungs clear bilaterally  Abdomen: Soft, non-tender, non-distended  No surgical scars    No masses, tenderness, hernias noted  Genitourinary: no  CVA tenderness, no  suprapubic tenderness  Labs:     Lab Results   Component Value Date    HGB 14 0 02/02/2023    HCT 41 3 02/02/2023    WBC 8 21 02/02/2023     02/02/2023   ]    Lab Results   Component Value Date     04/08/2015    K 3 1 (L) 02/01/2023     02/01/2023    CO2 26 02/01/2023    BUN 18 02/01/2023    CREATININE 0 62 02/01/2023    CALCIUM 8 8 02/01/2023    GLUCOSE 114 04/08/2015   ]    Imaging:   I personally reviewed the images and report of the following studies, and reviewed them with the patient:  CT ABDOMEN AND PELVIS WITH IV CONTRAST   IMPRESSION:     7 mm proximal right ureteral calculus just distal to the right UPJ with mild right hydronephrosis  Associated urothelial enhancement suggesting potential upper tract infection  Punctate bilateral nonobstructing renal calculi      ASSESSMENT:    #1    Right ureteral calculi with stent placement    PLAN:   -Scheduled for cystoscopy right ureteroscopy and laser stone removal and stent replacement  -Urine obtained and sent for culture  -Consent signed    Michell Lima PA-C

## 2023-03-27 NOTE — TELEPHONE ENCOUNTER
Spoke to patient and he is going to get labs and urine done soon for procedure  This patient is concerned, as he was told that he needs someone to be at home with him after the procedure, but he has no one  He also stated that he has no one to assist with the chlorhexadine wash prior to the procedure   Please assist  thanks

## 2023-03-28 ENCOUNTER — TELEPHONE (OUTPATIENT)
Dept: OTHER | Facility: OTHER | Age: 88
End: 2023-03-28

## 2023-03-28 LAB — BACTERIA UR CULT: NORMAL

## 2023-03-30 NOTE — TELEPHONE ENCOUNTER
I returned the patients call and reached his voice mail  I advised that he would most likely not need someone to stay with him for this procedure, just a  due to anesthesia  Also, he can use Dial soap rather than the Hibaclens  But which ever he uses it is no different than a regular shower and he would not need assistance more than any other day

## 2023-03-31 ENCOUNTER — APPOINTMENT (OUTPATIENT)
Dept: LAB | Facility: CLINIC | Age: 88
End: 2023-03-31

## 2023-03-31 DIAGNOSIS — N13.2 HYDRONEPHROSIS WITH URETERAL CALCULUS: ICD-10-CM

## 2023-03-31 LAB
ANION GAP SERPL CALCULATED.3IONS-SCNC: 4 MMOL/L (ref 4–13)
BASOPHILS # BLD AUTO: 0.09 THOUSANDS/ÂΜL (ref 0–0.1)
BASOPHILS NFR BLD AUTO: 1 % (ref 0–1)
BUN SERPL-MCNC: 18 MG/DL (ref 5–25)
CALCIUM SERPL-MCNC: 9.6 MG/DL (ref 8.3–10.1)
CHLORIDE SERPL-SCNC: 105 MMOL/L (ref 96–108)
CO2 SERPL-SCNC: 27 MMOL/L (ref 21–32)
CREAT SERPL-MCNC: 0.66 MG/DL (ref 0.6–1.3)
EOSINOPHIL # BLD AUTO: 0.3 THOUSAND/ÂΜL (ref 0–0.61)
EOSINOPHIL NFR BLD AUTO: 3 % (ref 0–6)
ERYTHROCYTE [DISTWIDTH] IN BLOOD BY AUTOMATED COUNT: 13.8 % (ref 11.6–15.1)
GFR SERPL CREATININE-BSD FRML MDRD: 86 ML/MIN/1.73SQ M
GLUCOSE P FAST SERPL-MCNC: 97 MG/DL (ref 65–99)
HCT VFR BLD AUTO: 42.7 % (ref 36.5–49.3)
HGB BLD-MCNC: 14.3 G/DL (ref 12–17)
IMM GRANULOCYTES # BLD AUTO: 0.05 THOUSAND/UL (ref 0–0.2)
IMM GRANULOCYTES NFR BLD AUTO: 1 % (ref 0–2)
LYMPHOCYTES # BLD AUTO: 1.72 THOUSANDS/ÂΜL (ref 0.6–4.47)
LYMPHOCYTES NFR BLD AUTO: 20 % (ref 14–44)
MCH RBC QN AUTO: 33.7 PG (ref 26.8–34.3)
MCHC RBC AUTO-ENTMCNC: 33.5 G/DL (ref 31.4–37.4)
MCV RBC AUTO: 101 FL (ref 82–98)
MONOCYTES # BLD AUTO: 0.81 THOUSAND/ÂΜL (ref 0.17–1.22)
MONOCYTES NFR BLD AUTO: 9 % (ref 4–12)
NEUTROPHILS # BLD AUTO: 5.8 THOUSANDS/ÂΜL (ref 1.85–7.62)
NEUTS SEG NFR BLD AUTO: 66 % (ref 43–75)
NRBC BLD AUTO-RTO: 0 /100 WBCS
PLATELET # BLD AUTO: 265 THOUSANDS/UL (ref 149–390)
PMV BLD AUTO: 12.5 FL (ref 8.9–12.7)
POTASSIUM SERPL-SCNC: 4 MMOL/L (ref 3.5–5.3)
RBC # BLD AUTO: 4.24 MILLION/UL (ref 3.88–5.62)
SODIUM SERPL-SCNC: 136 MMOL/L (ref 135–147)
WBC # BLD AUTO: 8.77 THOUSAND/UL (ref 4.31–10.16)

## 2023-04-06 ENCOUNTER — RA CDI HCC (OUTPATIENT)
Dept: OTHER | Facility: HOSPITAL | Age: 88
End: 2023-04-06

## 2023-04-06 ENCOUNTER — TELEPHONE (OUTPATIENT)
Dept: OTHER | Facility: OTHER | Age: 88
End: 2023-04-06

## 2023-04-06 NOTE — TELEPHONE ENCOUNTER
Patient had several questions regarding what medications he should and shouldn't be taking before procedure  Admits his memory is great and forgets what was said about a few of the medications including hydrochlorothiazide        Will send to surgery scheduler to see if she can answer these questions

## 2023-04-06 NOTE — TELEPHONE ENCOUNTER
Patient requesting a call back from the nurse to discuss questions he has about his upcoming appointment

## 2023-04-06 NOTE — PROGRESS NOTES
[facility]  coding opportunities       Chart reviewed, no opportunity found:   [address]        Patients Insurance     Medicare Insurance: [employer]

## 2023-04-10 PROBLEM — Z46.6 ENCOUNTER FOR ADJUSTMENT OF URETERAL STENT: Status: ACTIVE | Noted: 2023-04-10

## 2023-04-10 PROBLEM — N20.1 RIGHT URETERAL STONE: Status: ACTIVE | Noted: 2023-04-10

## 2023-04-17 DIAGNOSIS — S72.001D CLOSED FRACTURE OF NECK OF RIGHT FEMUR WITH ROUTINE HEALING: ICD-10-CM

## 2023-04-17 DIAGNOSIS — R29.898 WEAKNESS OF BOTH LOWER EXTREMITIES: ICD-10-CM

## 2023-04-17 DIAGNOSIS — R26.2 AMBULATORY DYSFUNCTION: Primary | ICD-10-CM

## 2023-05-12 ENCOUNTER — HOSPITAL ENCOUNTER (OUTPATIENT)
Dept: ULTRASOUND IMAGING | Facility: HOSPITAL | Age: 88
Discharge: HOME | End: 2023-05-12
Attending: UROLOGY

## 2023-05-12 DIAGNOSIS — N20.0 NEPHROLITHIASIS: ICD-10-CM

## 2023-06-01 ENCOUNTER — TELEPHONE (OUTPATIENT)
Dept: UROLOGY | Facility: MEDICAL CENTER | Age: 88
End: 2023-06-01

## 2023-06-08 DIAGNOSIS — I35.9 AORTIC VALVE DISEASE: ICD-10-CM

## 2023-06-08 RX ORDER — AMLODIPINE BESYLATE 5 MG/1
TABLET ORAL
Qty: 60 TABLET | Refills: 10 | Status: SHIPPED | OUTPATIENT
Start: 2023-06-08

## 2023-06-20 ENCOUNTER — REMOTE DEVICE CLINIC VISIT (OUTPATIENT)
Dept: CARDIOLOGY CLINIC | Facility: CLINIC | Age: 88
End: 2023-06-20
Payer: COMMERCIAL

## 2023-06-20 DIAGNOSIS — Z95.0 CARDIAC PACEMAKER IN SITU: Primary | ICD-10-CM

## 2023-06-20 PROCEDURE — 93294 REM INTERROG EVL PM/LDLS PM: CPT | Performed by: INTERNAL MEDICINE

## 2023-06-20 PROCEDURE — 93296 REM INTERROG EVL PM/IDS: CPT | Performed by: INTERNAL MEDICINE

## 2023-06-20 NOTE — PROGRESS NOTES
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

## 2023-07-05 ENCOUNTER — VBI (OUTPATIENT)
Dept: ADMINISTRATIVE | Facility: OTHER | Age: 88
End: 2023-07-05

## 2023-07-06 DIAGNOSIS — K59.00 CONSTIPATION, UNSPECIFIED CONSTIPATION TYPE: Primary | ICD-10-CM

## 2023-07-06 RX ORDER — POLYETHYLENE GLYCOL 3350 17 G/17G
POWDER, FOR SOLUTION ORAL
Qty: 476 G | Refills: 0 | Status: SHIPPED | OUTPATIENT
Start: 2023-07-06

## 2023-07-27 ENCOUNTER — RA CDI HCC (OUTPATIENT)
Dept: OTHER | Facility: HOSPITAL | Age: 88
End: 2023-07-27

## 2023-07-27 NOTE — PROGRESS NOTES
[facility] coding opportunities       Chart reviewed, no opportunity found: CHART REVIEWED, NO OPPORTUNITY FOUND        Patients Insurance     Medicare Insurance: Medicare

## 2023-08-03 ENCOUNTER — TELEMEDICINE (OUTPATIENT)
Dept: FAMILY MEDICINE CLINIC | Facility: CLINIC | Age: 88
End: 2023-08-03
Payer: COMMERCIAL

## 2023-08-03 VITALS — WEIGHT: 199 LBS | TEMPERATURE: 98.1 F | HEIGHT: 73 IN | BODY MASS INDEX: 26.37 KG/M2

## 2023-08-03 DIAGNOSIS — Z00.00 MEDICARE ANNUAL WELLNESS VISIT, SUBSEQUENT: Primary | ICD-10-CM

## 2023-08-03 PROCEDURE — G0439 PPPS, SUBSEQ VISIT: HCPCS | Performed by: FAMILY MEDICINE

## 2023-08-03 NOTE — PROGRESS NOTES
Assessment and Plan:     Problem List Items Addressed This Visit    None       Preventive health issues were discussed with patient, and age appropriate screening tests were ordered as noted in patient's After Visit Summary. Personalized health advice and appropriate referrals for health education or preventive services given if needed, as noted in patient's After Visit Summary. History of Present Illness:     Patient presents for a Medicare Wellness Visit    Patient in the office to review chronic medical condition. He completed course of home physical therapy and is feeling somewhat more comfortable with his balance but continues to be primarily wheelchair-bound throughout the day. He does ambulate short distances with a walker with wheels however he feels he has a significant kyphotic gait due to low back pain which prevents extension of his back. We reviewed his latest cardiology correspondence which stated patient seems to be in stable health from a cardiac standpoint. Patient denies any recent illness. Patient has follow-up with urologist next month to review recent ultrasound he had performed. Patient denies any discomfort related to previous kidney stone that was removed. Patient Care Team:  Shy Mckinnon MD as PCP - General  MD Amada Chambers MD     Review of Systems:     Review of Systems   Constitutional: Positive for fatigue. Respiratory: Negative. Cardiovascular: Negative. Gastrointestinal: Negative. Musculoskeletal: Positive for arthralgias, back pain and gait problem. Neurological:        As per HPI   Psychiatric/Behavioral: Negative.          Problem List:     Patient Active Problem List   Diagnosis   • Aortic valve disease   • Cervical radiculopathy   • Hypertension   • LVH (left ventricular hypertrophy)   • Other malignant neoplasm of unspecified site   • Spinal stenosis   • Obsessive compulsive disorder   • Dysthymic disorder   • Gait disturbance   • Dry eye   • SVT (supraventricular tachycardia) ([facility])   • Nocturia   • Fall   • Closed fracture of neck of right femur with routine healing   • Abnormal finding on CT scan   • BPH (benign prostatic hyperplasia)   • Full code status   • Constipation   • Diuretic-induced hypokalemia   • Presence of cardiac pacemaker   • Aftercare following surgery of the musculoskeletal system   • Ambulatory dysfunction   • Chest pain   • Hydronephrosis with ureteral calculus   • Ascending aortic aneurysm [facility])   • Right ureteral stone   • Encounter for adjustment of ureteral stent      Past Medical and Surgical History:     Past Medical History:   Diagnosis Date   • Aortic aneurysm ([address]) 2018    per pt-had echo   • Balanitis 01/02/2020   • Basal cell carcinoma    • Bradycardia    • Degenerative joint disease (DJD) of lumbar spine    • Depression    • Dizziness    • Hypertension    • Kidney stones    • Pacemaker    • Pneumonia 2018   • Spinal stenosis     per pt     Past Surgical History:   Procedure Laterality Date   • CARDIAC PACEMAKER PLACEMENT     • CHOLECYSTECTOMY     • EYE SURGERY     • FL RETROGRADE PYELOGRAM  2/1/2023   • FL RETROGRADE PYELOGRAM  4/11/2023   • FL CYSTO BLADDER W/URETERAL CATHETERIZATION Right 2/1/2023    Procedure: CYSTOSCOPY RETROGRADE PYELOGRAM WITH INSERTION STENT URETERAL;  Surgeon: Hudson De Leon MD;  [facility];  Service: Urology   • FL CYSTO/URETERO W/LITHOTRIPSY &INDWELL STENT INSRT Right 4/11/2023    Procedure: CYSTOSCOPY URETEROSCOPY WITH LITHOTRIPSY HOLMIUM LASER, RETROGRADE PYELOGRAM AND INSERTION STENT URETERAL, STONE BASKET EXTRACTION;  Surgeon: Hudson De eLon MD;  [facility];  Service: Urology   • FL HEMIARTHROPLASTY HIP PARTIAL Right 10/10/2021    Procedure: HEMIARTHROPLASTY HIP (BIPOLAR), RIGHT;  Surgeon: Ryder Estes MD;  [facility];  Service: Orthopedics   • TONSILLECTOMY        Family History:     Family History   Problem Relation Age of Onset   • Diabetes Mother    • Heart disease Mother       Social History:     Social History     Socioeconomic History   • Marital status:      Spouse name: None   • Number of children: None   • Years of education: None   • Highest education level: None   Occupational History   • Occupation: Retired   Tobacco Use   • Smoking status: Former     Types: Cigarettes, Pipe, Cigars   • Smokeless tobacco: Never   • Tobacco comments:     Infrequently for brief period   Vaping Use   • Vaping Use: Never used   Substance and Sexual Activity   • Alcohol use: Not Currently     Comment: occasionally; Per [employer]  No Alcohol use   • Drug use: No   • Sexual activity: Not Currently   Other Topics Concern   • None   Social History Narrative   • None     Social Determinants of Health     Financial Resource Strain: Not on file   Food Insecurity: No Food Insecurity (2/2/2023)    Hunger Vital Sign    • Worried About Running Out of Food in the Last Year: Never true    • Ran Out of Food in the Last Year: Never true   Transportation Needs: No Transportation Needs (2/2/2023)    PRAPARE - Transportation    • Lack of Transportation (Medical): No    • Lack of Transportation (Non-Medical):  No   Physical Activity: Not on file   Stress: Not on file   Social Connections: Not on file   Intimate Partner Violence: Not on file   Housing Stability: Low Risk  (2/2/2023)    Housing Stability Vital Sign    • Unable to Pay for Housing in the Last Year: No    • Number of Places Lived in the Last Year: 1    • Unstable Housing in the Last Year: No      Medications and Allergies:     Current Outpatient Medications   Medication Sig Dispense Refill   • amLODIPine (NORVASC) 5 mg tablet TAKE 1 TABLET TWICE DAILY 60 tablet 10   • B Complex Vitamins (VITAMIN B COMPLEX PO) Take 1 tablet by mouth daily L.D. 4/3 for sx     • Cholecalciferol (VITAMIN D PO) Take 1 capsule by mouth daily L.D. 4/3 for sx     • hydrochlorothiazide (MICROZIDE) 12.5 mg capsule TAKE 1 CAPSULE DAILY 90 capsule 3   • metoprolol tartrate (LOPRESSOR) 25 mg tablet TAKE 1/2 TABLET EVERY 12 HOURS 90 tablet 3   • polyethylene glycol (MIRALAX) 17 g packet Take 17 g by mouth daily at bedtime     • potassium chloride (K-DUR,KLOR-CON) 20 mEq tablet TAKE 2 TABLETS (40 MEQ TOTAL) BY MOUTH DAILY AS DIRECTED (Patient taking differently: Take 20 mEq by mouth daily Takes 1 tablet daily) 180 tablet 3   • senna-docusate sodium (SENOKOT S) 8.6-50 mg per tablet Take 1 tablet by mouth 2 (two) times a day (Patient taking differently: Take 1 tablet by mouth if needed)  0   • diclofenac potassium (CATAFLAM) 50 mg tablet Take 1 tablet (50 mg total) by mouth 2 (two) times a day for 5 days 10 tablet 0   • polyethylene glycol (GLYCOLAX) 17 GM/SCOOP powder TAKE 17 G (1 CAPFUL) BY MOUTH DAILY AS NEEDED (CONSTIPATION) - MIX WITH LIQUID BEFORE TAKING. (Patient not taking: Reported on 8/3/2023) 476 g 0   • tamsulosin (FLOMAX) 0.4 mg TAKE 1 CAPSULE (0.4 MG TOTAL) BY MOUTH DAILY AT BEDTIME (Patient not taking: Reported on 8/3/2023) 30 capsule 5   • triamcinolone (KENALOG) 0.1 % cream Apply topically twice daily for 1 week to scalp (Patient not taking: Reported on 4/6/2023) 80 g 0     No current facility-administered medications for this visit.      No Known Allergies   Immunizations:     Immunization History   Administered Date(s) Administered   • COVID-19 PFIZER VACCINE 0.3 ML IM 02/12/2021, 03/04/2021   • INFLUENZA 09/20/2014, 10/14/2016, 10/16/2017, 08/19/2018, 10/07/2019, 09/19/2021   • Influenza Split High Dose Preservative Free IM 09/20/2014, 10/14/2016, 10/16/2017   • Influenza, seasonal, injectable 11/04/2013   • Influenza, seasonal, injectable, preservative free 10/07/2019   • Pneumococcal Conjugate 13-Valent 04/16/2015   • Pneumococcal Polysaccharide PPV23 10/01/2009   • Td (adult), adsorbed 08/01/2010   • Tdap 04/16/2015   • Zoster Vaccine Recombinant 07/09/2019, 09/07/2019   • influenza, trivalent, adjuvanted 09/14/2019      Health Maintenance: There are no preventive care reminders to display for this patient. Topic Date Due   • COVID-19 Vaccine (3 - Pfizer series) 04/29/2021   • Influenza Vaccine (1) 09/01/2023      Medicare Screening Tests and Risk Assessments:         Health Risk Assessment:   Patient rates overall health as good. Patient feels that their physical health rating is same. Patient is very satisfied with their life. Eyesight was rated as same. Hearing was rated as same. Patient feels that their emotional and mental health rating is slightly worse. Patients states they are never, rarely angry. Patient states they are never, rarely unusually tired/fatigued. Pain experienced in the last 7 days has been none. Patient states that he has experienced no weight loss or gain in last 6 months. Fall Risk Screening: In the past year, patient has experienced: history of falling in past year    Number of falls: 2 or more  Injured during fall?: Yes    Feels unsteady when standing or walking?: Yes    Worried about falling?: Yes      Home Safety:  Patient has trouble with stairs inside or outside of their home. Patient has working smoke alarms and has working carbon monoxide detector. Home safety hazards include: none. Nutrition:   Current diet is Regular. Medications:   Patient is currently taking over-the-counter supplements. OTC medications include: see medication list. Patient is able to manage medications. Activities of Daily Living (ADLs)/Instrumental Activities of Daily Living (IADLs):   Walk and transfer into and out of bed and chair?: Yes  Dress and groom yourself?: Yes    Bathe or shower yourself?: No    Feed yourself?  Yes  Do your laundry/housekeeping?: No  Manage your money, pay your bills and track your expenses?: Yes  Make your own meals?: Yes    Do your own shopping?: No    Previous Hospitalizations:   Any hospitalizations or ED visits within the last 12 months?: Yes    How many hospitalizations have you had in the last year?: 1-2    Advance Care Planning:   Living will: Yes    Advanced directive: Yes      PREVENTIVE SCREENINGS      Cardiovascular Screening:    General: Screening Current      Diabetes Screening:     General: Screening Current      Colorectal Cancer Screening:     General: Screening Not Indicated      Prostate Cancer Screening:    General: Screening Not Indicated      Abdominal Aortic Aneurysm (AAA) Screening:    Risk factors include: tobacco use        Lung Cancer Screening:     General: Screening Not Indicated    Screening, Brief Intervention, and Referral to Treatment (SBIRT)    Screening    Typical number of drinks in a week: 0    Single Item Drug Screening:  How often have you used an illegal drug (including marijuana) or a prescription medication for non-medical reasons in the past year? never    Single Item Drug Screen Score: 0  Interpretation: Negative screen for possible drug use disorder    No results found. Physical Exam:     Temp 98.1 °F (36.7 °C)   Ht 6' 1" (1.854 m)   Wt 90.3 kg (199 lb)   BMI 26.25 kg/m²     Physical Exam  Constitutional:       General: He is not in acute distress. Appearance: Normal appearance. He is not ill-appearing. Eyes:      General:         Right eye: No discharge. Left eye: No discharge. Extraocular Movements: Extraocular movements intact. Conjunctiva/sclera: Conjunctivae normal.      Pupils: Pupils are equal, round, and reactive to light. Pulmonary:      Effort: No respiratory distress. Neurological:      Mental Status: He is alert. Psychiatric:         Mood and Affect: Mood normal.         Behavior: Behavior normal.         Thought Content:  Thought content normal.         Judgment: Judgment normal.          Conception MD Adrián Virtual AWV Consent    Verification of patient location:    Patient is located at Home in the following state in which I hold an active license [place]    The patient was identified by name and date of birth. Jacqui Vickers was informed that this is a telemedicine visit and that the visit is being conducted through the [address] [place] [address] [employer] platform. He agrees to proceed. .  My office door was closed. No one else was in the room. He acknowledged consent and understanding of privacy and security of the video platform. The patient has agreed to participate and understands they can discontinue the visit at any time. Patient is aware this is a billable service. Reason for visit is AWV  Encounter provider Gaurang An MD    Provider located at [address] [place] [address]      Recent Visits  No visits were found meeting these conditions. Showing recent visits within past 7 days and meeting all other requirements  Today's Visits  Date Type Provider Dept   08/03/23 Telemedicine Gaurang An MD [address] [facility] today's visits and meeting all other requirements  Future Appointments  No visits were found meeting these conditions. Showing future appointments within next 150 days and meeting all other requirements           Visit Time  Total Visit Duration: 20  Virtual AWV Consent    Verification of patient location:    Patient is located at Home in the following state in which I hold an active license [place]    The patient was identified by name and date of birth. Jacqui Vickers was informed that this is a telemedicine visit and that the visit is being conducted through the [address] [place] [address] [employer] platform. He agrees to proceed. .  My office door was closed. No one else was in the room. He acknowledged consent and understanding of privacy and security of the video platform. The patient has agreed to participate and understands they can discontinue the visit at any time. Patient is aware this is a billable service.      Reason for visit is AWV    Encounter provider Gaurang An MD    Provider located at [address] [facility]  [address] [facility]  [place] [address]      Recent Visits  No visits were found meeting these conditions. Showing recent visits within past 7 days and meeting all other requirements  Today's Visits  Date Type Provider Dept   08/03/23 Telemedicine Hawa Blakely MD [address] [facility] today's visits and meeting all other requirements  Future Appointments  No visits were found meeting these conditions.   Showing future appointments within next 150 days and meeting all other requirements           Visit Time  Total Visit Duration: 20

## 2023-08-03 NOTE — PATIENT INSTRUCTIONS
Medicare Preventive Visit Patient Instructions  Thank you for completing your Welcome to Medicare Visit or Medicare Annual Wellness Visit today. Your next wellness visit will be due in one year (8/3/2024). The screening/preventive services that you may require over the next 5-10 years are detailed below. Some tests may not apply to you based off risk factors and/or age. Screening tests ordered at today's visit but not completed yet may show as past due. Also, please note that scanned in results may not display below. Preventive Screenings:  Service Recommendations Previous Testing/Comments   Colorectal Cancer Screening  · Colonoscopy    · Fecal Occult Blood Test (FOBT)/Fecal Immunochemical Test (FIT)  · Fecal DNA/Cologuard Test  · Flexible Sigmoidoscopy Age: 43-73 years old   Colonoscopy: every 10 years (May be performed more frequently if at higher risk)  OR  FOBT/FIT: every 1 year  OR  Cologuard: every 3 years  OR  Sigmoidoscopy: every 5 years  Screening may be recommended earlier than age 39 if at higher risk for colorectal cancer. Also, an individualized decision between you and your healthcare provider will decide whether screening between the ages of 77-80 would be appropriate.  Colonoscopy: Not on file  FOBT/FIT: Not on file  Cologuard: Not on file  Sigmoidoscopy: Not on file    Screening Not Indicated     Prostate Cancer Screening Individualized decision between patient and health care provider in men between ages of 53-66   Medicare will cover every 12 months beginning on the day after your 50th birthday PSA: 2.0 ng/mL     Screening Not Indicated     Hepatitis C Screening Once for adults born between 1945 and 1965  More frequently in patients at high risk for Hepatitis C Hep C Antibody: Not on file        Diabetes Screening 1-2 times per year if you're at risk for diabetes or have pre-diabetes Fasting glucose: 97 mg/dL (3/31/2023)  A1C: No results in last 5 years (No results in last 5 years)  Screening Current   Cholesterol Screening Once every 5 years if you don't have a lipid disorder. May order more often based on risk factors. Lipid panel: 04/05/2021  Screening Current      Other Preventive Screenings Covered by Medicare:  1. Abdominal Aortic Aneurysm (AAA) Screening: covered once if your at risk. You're considered to be at risk if you have a family history of AAA or a male between the age of 70-76 who smoking at least 100 cigarettes in your lifetime. 2. Lung Cancer Screening: covers low dose CT scan once per year if you meet all of the following conditions: (1) Age 48-67; (2) No signs or symptoms of lung cancer; (3) Current smoker or have quit smoking within the last 15 years; (4) You have a tobacco smoking history of at least 20 pack years (packs per day x number of years you smoked); (5) You get a written order from a healthcare provider. 3. Glaucoma Screening: covered annually if you're considered high risk: (1) You have diabetes OR (2) Family history of glaucoma OR (3)  aged 48 and older OR (3)  American aged 72 and older  3. Osteoporosis Screening: covered every 2 years if you meet one of the following conditions: (1) Have a vertebral abnormality; (2) On glucocorticoid therapy for more than 3 months; (3) Have primary hyperparathyroidism; (4) On osteoporosis medications and need to assess response to drug therapy. 5. HIV Screening: covered annually if you're between the age of 14-79. Also covered annually if you are younger than 13 and older than 72 with risk factors for HIV infection. For pregnant patients, it is covered up to 3 times per pregnancy.     Immunizations:  Immunization Recommendations   Influenza Vaccine Annual influenza vaccination during flu season is recommended for all persons aged >= 6 months who do not have contraindications   Pneumococcal Vaccine   * Pneumococcal conjugate vaccine = PCV13 (Prevnar 13), PCV15 (Vaxneuvance), PCV20 (Prevnar 20)  * Pneumococcal polysaccharide vaccine = PPSV23 (Pneumovax) Adults 2364 years old: 1-3 doses may be recommended based on certain risk factors  Adults 72 years old: 1-2 doses may be recommended based off what pneumonia vaccine you previously received   Hepatitis B Vaccine 3 dose series if at intermediate or high risk (ex: diabetes, end stage renal disease, liver disease)   Tetanus (Td) Vaccine - COST NOT COVERED BY MEDICARE PART B Following completion of primary series, a booster dose should be given every 10 years to maintain immunity against tetanus. Td may also be given as tetanus wound prophylaxis. Tdap Vaccine - COST NOT COVERED BY MEDICARE PART B Recommended at least once for all adults. For pregnant patients, recommended with each pregnancy. Shingles Vaccine (Shingrix) - COST NOT COVERED BY MEDICARE PART B  2 shot series recommended in those aged 48 and above     Health Maintenance Due:  There are no preventive care reminders to display for this patient. Immunizations Due:      Topic Date Due   • COVID-19 Vaccine (3 - Pfizer series) 04/29/2021   • Influenza Vaccine (1) 09/01/2023     Advance Directives   What are advance directives? Advance directives are legal documents that state your wishes and plans for medical care. These plans are made ahead of time in case you lose your ability to make decisions for yourself. Advance directives can apply to any medical decision, such as the treatments you want, and if you want to donate organs. What are the types of advance directives? There are many types of advance directives, and each state has rules about how to use them. You may choose a combination of any of the following:  · Living will: This is a written record of the treatment you want. You can also choose which treatments you do not want, which to limit, and which to stop at a certain time. This includes surgery, medicine, IV fluid, and tube feedings. · Durable power of  for healthcare [facility]):   This is a written record that states who you want to make healthcare choices for you when you are unable to make them for yourself. This person, called a proxy, is usually a family member or a friend. You may choose more than 1 proxy. · Do not resuscitate (DNR) order:  A DNR order is used in case your heart stops beating or you stop breathing. It is a request not to have certain forms of treatment, such as CPR. A DNR order may be included in other types of advance directives. · Medical directive: This covers the care that you want if you are in a coma, near death, or unable to make decisions for yourself. You can list the treatments you want for each condition. Treatment may include pain medicine, surgery, blood transfusions, dialysis, IV or tube feedings, and a ventilator (breathing machine). · Values history: This document has questions about your views, beliefs, and how you feel and think about life. This information can help others choose the care that you would choose. Why are advance directives important? An advance directive helps you control your care. Although spoken wishes may be used, it is better to have your wishes written down. Spoken wishes can be misunderstood, or not followed. Treatments may be given even if you do not want them. An advance directive may make it easier for your family to make difficult choices about your care. Fall Prevention    Fall prevention  includes ways to make your home and other areas safer. It also includes ways you can move more carefully to prevent a fall. Health conditions that cause changes in your blood pressure, vision, or muscle strength and coordination may increase your risk for falls. Medicines may also increase your risk for falls if they make you dizzy, weak, or sleepy. Fall prevention tips:   · Stand or sit up slowly. · Use assistive devices as directed. · Wear shoes that fit well and have soles that . · Wear a personal alarm. · Stay active. · Manage your medical conditions. Home Safety Tips:  · Add items to prevent falls in the bathroom. · Keep paths clear. · Install bright lights in your home. · Keep items you use often on shelves within reach. · Paint or place reflective tape on the edges of your stairs. Weight Management   Why it is important to manage your weight:  Being overweight increases your risk of health conditions such as heart disease, high blood pressure, type 2 diabetes, and certain types of cancer. It can also increase your risk for osteoarthritis, sleep apnea, and other respiratory problems. Aim for a slow, steady weight loss. Even a small amount of weight loss can lower your risk of health problems. How to lose weight safely:  A safe and healthy way to lose weight is to eat fewer calories and get regular exercise. You can lose up about 1 pound a week by decreasing the number of calories you eat by 500 calories each day. Healthy meal plan for weight management:  A healthy meal plan includes a variety of foods, contains fewer calories, and helps you stay healthy. A healthy meal plan includes the following:  · Eat whole-grain foods more often. A healthy meal plan should contain fiber. Fiber is the part of grains, fruits, and vegetables that is not broken down by your body. Whole-grain foods are healthy and provide extra fiber in your diet. Some examples of whole-grain foods are whole-wheat breads and pastas, oatmeal, brown rice, and bulgur. · Eat a variety of vegetables every day. Include dark, leafy greens such as spinach, kale, emeterio greens, and mustard greens. Eat yellow and orange vegetables such as carrots, sweet potatoes, and winter squash. · Eat a variety of fruits every day. Choose fresh or canned fruit (canned in its own juice or light syrup) instead of juice. Fruit juice has very little or no fiber. · Eat low-fat dairy foods. Drink fat-free (skim) milk or 1% milk.  Eat fat-free yogurt and low-fat cottage cheese. Try low-fat cheeses such as mozzarella and other reduced-fat cheeses. · Choose meat and other protein foods that are low in fat. Choose beans or other legumes such as split peas or lentils. Choose fish, skinless poultry (chicken or turkey), or lean cuts of red meat (beef or pork). Before you cook meat or poultry, cut off any visible fat. · Use less fat and oil. Try baking foods instead of frying them. Add less fat, such as margarine, sour cream, regular salad dressing and mayonnaise to foods. Eat fewer high-fat foods. Some examples of high-fat foods include french fries, doughnuts, ice cream, and cakes. · Eat fewer sweets. Limit foods and drinks that are high in sugar. This includes candy, cookies, regular soda, and sweetened drinks. Exercise:  Exercise at least 30 minutes per day on most days of the week. Some examples of exercise include walking, biking, dancing, and swimming. You can also fit in more physical activity by taking the stairs instead of the elevator or parking farther away from stores. Ask your healthcare provider about the best exercise plan for you. © Copyright [employer] 2018 Information is for End User's use only and may not be sold, redistributed or otherwise used for commercial purposes. All illustrations and images included in CareNotes® are the copyrighted property of [employer] or [facility] Preventive Visit Patient Instructions  Thank you for completing your Welcome to Medicare Visit or Medicare Annual Wellness Visit today. Your next wellness visit will be due in one year (8/3/2024). The screening/preventive services that you may require over the next 5-10 years are detailed below. Some tests may not apply to you based off risk factors and/or age. Screening tests ordered at today's visit but not completed yet may show as past due. Also, please note that scanned in results may not display below.   Preventive Screenings:  Service Recommendations Previous Testing/Comments   Colorectal Cancer Screening  · Colonoscopy    · Fecal Occult Blood Test (FOBT)/Fecal Immunochemical Test (FIT)  · Fecal DNA/Cologuard Test  · Flexible Sigmoidoscopy Age: 43-73 years old   Colonoscopy: every 10 years (May be performed more frequently if at higher risk)  OR  FOBT/FIT: every 1 year  OR  Cologuard: every 3 years  OR  Sigmoidoscopy: every 5 years  Screening may be recommended earlier than age 39 if at higher risk for colorectal cancer. Also, an individualized decision between you and your healthcare provider will decide whether screening between the ages of 77-80 would be appropriate. Colonoscopy: Not on file  FOBT/FIT: Not on file  Cologuard: Not on file  Sigmoidoscopy: Not on file    Screening Not Indicated     Prostate Cancer Screening Individualized decision between patient and health care provider in men between ages of 53-66   Medicare will cover every 12 months beginning on the day after your 50th birthday PSA: 2.0 ng/mL     Screening Not Indicated     Hepatitis C Screening Once for adults born between 1945 and 1965  More frequently in patients at high risk for Hepatitis C Hep C Antibody: Not on file        Diabetes Screening 1-2 times per year if you're at risk for diabetes or have pre-diabetes Fasting glucose: 97 mg/dL (3/31/2023)  A1C: No results in last 5 years (No results in last 5 years)  Screening Current   Cholesterol Screening Once every 5 years if you don't have a lipid disorder. May order more often based on risk factors. Lipid panel: 04/05/2021  Screening Current      Other Preventive Screenings Covered by Medicare:  6. Abdominal Aortic Aneurysm (AAA) Screening: covered once if your at risk. You're considered to be at risk if you have a family history of AAA or a male between the age of 70-76 who smoking at least 100 cigarettes in your lifetime.   7. Lung Cancer Screening: covers low dose CT scan once per year if you meet all of the following conditions: (1) Age 48-67; (2) No signs or symptoms of lung cancer; (3) Current smoker or have quit smoking within the last 15 years; (4) You have a tobacco smoking history of at least 20 pack years (packs per day x number of years you smoked); (5) You get a written order from a healthcare provider. 8. Glaucoma Screening: covered annually if you're considered high risk: (1) You have diabetes OR (2) Family history of glaucoma OR (3)  aged 48 and older OR (3)  American aged 72 and older  5. Osteoporosis Screening: covered every 2 years if you meet one of the following conditions: (1) Have a vertebral abnormality; (2) On glucocorticoid therapy for more than 3 months; (3) Have primary hyperparathyroidism; (4) On osteoporosis medications and need to assess response to drug therapy. 10. HIV Screening: covered annually if you're between the age of 14-79. Also covered annually if you are younger than 13 and older than 72 with risk factors for HIV infection. For pregnant patients, it is covered up to 3 times per pregnancy. Immunizations:  Immunization Recommendations   Influenza Vaccine Annual influenza vaccination during flu season is recommended for all persons aged >= 6 months who do not have contraindications   Pneumococcal Vaccine   * Pneumococcal conjugate vaccine = PCV13 (Prevnar 13), PCV15 (Vaxneuvance), PCV20 (Prevnar 20)  * Pneumococcal polysaccharide vaccine = PPSV23 (Pneumovax) Adults 20-63 years old: 1-3 doses may be recommended based on certain risk factors  Adults 72 years old: 1-2 doses may be recommended based off what pneumonia vaccine you previously received   Hepatitis B Vaccine 3 dose series if at intermediate or high risk (ex: diabetes, end stage renal disease, liver disease)   Tetanus (Td) Vaccine - COST NOT COVERED BY MEDICARE PART B Following completion of primary series, a booster dose should be given every 10 years to maintain immunity against tetanus.  Td may also be given as tetanus wound prophylaxis. Tdap Vaccine - COST NOT COVERED BY MEDICARE PART B Recommended at least once for all adults. For pregnant patients, recommended with each pregnancy. Shingles Vaccine (Shingrix) - COST NOT COVERED BY MEDICARE PART B  2 shot series recommended in those aged 48 and above     Health Maintenance Due:  There are no preventive care reminders to display for this patient. Immunizations Due:      Topic Date Due   • COVID-19 Vaccine (3 - Pfizer series) 04/29/2021   • Influenza Vaccine (1) 09/01/2023     Advance Directives   What are advance directives? Advance directives are legal documents that state your wishes and plans for medical care. These plans are made ahead of time in case you lose your ability to make decisions for yourself. Advance directives can apply to any medical decision, such as the treatments you want, and if you want to donate organs. What are the types of advance directives? There are many types of advance directives, and each state has rules about how to use them. You may choose a combination of any of the following:  · Living will: This is a written record of the treatment you want. You can also choose which treatments you do not want, which to limit, and which to stop at a certain time. This includes surgery, medicine, IV fluid, and tube feedings. · Durable power of  for healthcare [facility]): This is a written record that states who you want to make healthcare choices for you when you are unable to make them for yourself. This person, called a proxy, is usually a family member or a friend. You may choose more than 1 proxy. · Do not resuscitate (DNR) order:  A DNR order is used in case your heart stops beating or you stop breathing. It is a request not to have certain forms of treatment, such as CPR. A DNR order may be included in other types of advance directives. · Medical directive:   This covers the care that you want if you are in a coma, near death, or unable to make decisions for yourself. You can list the treatments you want for each condition. Treatment may include pain medicine, surgery, blood transfusions, dialysis, IV or tube feedings, and a ventilator (breathing machine). · Values history: This document has questions about your views, beliefs, and how you feel and think about life. This information can help others choose the care that you would choose. Why are advance directives important? An advance directive helps you control your care. Although spoken wishes may be used, it is better to have your wishes written down. Spoken wishes can be misunderstood, or not followed. Treatments may be given even if you do not want them. An advance directive may make it easier for your family to make difficult choices about your care. Fall Prevention    Fall prevention  includes ways to make your home and other areas safer. It also includes ways you can move more carefully to prevent a fall. Health conditions that cause changes in your blood pressure, vision, or muscle strength and coordination may increase your risk for falls. Medicines may also increase your risk for falls if they make you dizzy, weak, or sleepy. Fall prevention tips:   · Stand or sit up slowly. · Use assistive devices as directed. · Wear shoes that fit well and have soles that . · Wear a personal alarm. · Stay active. · Manage your medical conditions. Home Safety Tips:  · Add items to prevent falls in the bathroom. · Keep paths clear. · Install bright lights in your home. · Keep items you use often on shelves within reach. · Paint or place reflective tape on the edges of your stairs. Weight Management   Why it is important to manage your weight:  Being overweight increases your risk of health conditions such as heart disease, high blood pressure, type 2 diabetes, and certain types of cancer.  It can also increase your risk for osteoarthritis, sleep apnea, and other respiratory problems. Aim for a slow, steady weight loss. Even a small amount of weight loss can lower your risk of health problems. How to lose weight safely:  A safe and healthy way to lose weight is to eat fewer calories and get regular exercise. You can lose up about 1 pound a week by decreasing the number of calories you eat by 500 calories each day. Healthy meal plan for weight management:  A healthy meal plan includes a variety of foods, contains fewer calories, and helps you stay healthy. A healthy meal plan includes the following:  · Eat whole-grain foods more often. A healthy meal plan should contain fiber. Fiber is the part of grains, fruits, and vegetables that is not broken down by your body. Whole-grain foods are healthy and provide extra fiber in your diet. Some examples of whole-grain foods are whole-wheat breads and pastas, oatmeal, brown rice, and bulgur. · Eat a variety of vegetables every day. Include dark, leafy greens such as spinach, kale, emeterio greens, and mustard greens. Eat yellow and orange vegetables such as carrots, sweet potatoes, and winter squash. · Eat a variety of fruits every day. Choose fresh or canned fruit (canned in its own juice or light syrup) instead of juice. Fruit juice has very little or no fiber. · Eat low-fat dairy foods. Drink fat-free (skim) milk or 1% milk. Eat fat-free yogurt and low-fat cottage cheese. Try low-fat cheeses such as mozzarella and other reduced-fat cheeses. · Choose meat and other protein foods that are low in fat. Choose beans or other legumes such as split peas or lentils. Choose fish, skinless poultry (chicken or turkey), or lean cuts of red meat (beef or pork). Before you cook meat or poultry, cut off any visible fat. · Use less fat and oil. Try baking foods instead of frying them. Add less fat, such as margarine, sour cream, regular salad dressing and mayonnaise to foods. Eat fewer high-fat foods.  Some examples of high-fat foods include french fries, doughnuts, ice cream, and cakes. · Eat fewer sweets. Limit foods and drinks that are high in sugar. This includes candy, cookies, regular soda, and sweetened drinks. Exercise:  Exercise at least 30 minutes per day on most days of the week. Some examples of exercise include walking, biking, dancing, and swimming. You can also fit in more physical activity by taking the stairs instead of the elevator or parking farther away from stores. Ask your healthcare provider about the best exercise plan for you. © Copyright [address] 2018 Information is for End User's use only and may not be sold, redistributed or otherwise used for commercial purposes.  All illustrations and images included in CareNotes® are the copyrighted property of [employer] or [address] [place] [address]

## 2023-09-12 DIAGNOSIS — K59.00 CONSTIPATION, UNSPECIFIED CONSTIPATION TYPE: ICD-10-CM

## 2023-09-12 RX ORDER — POLYETHYLENE GLYCOL 3350 17 G/17G
POWDER, FOR SOLUTION ORAL
Qty: 476 G | Refills: 0 | Status: SHIPPED | OUTPATIENT
Start: 2023-09-12

## 2023-10-06 ENCOUNTER — IN-CLINIC DEVICE VISIT (OUTPATIENT)
Dept: CARDIOLOGY CLINIC | Facility: CLINIC | Age: 88
End: 2023-10-06
Payer: COMMERCIAL

## 2023-10-06 DIAGNOSIS — Z95.0 PRESENCE OF PERMANENT CARDIAC PACEMAKER: Primary | ICD-10-CM

## 2023-10-06 PROCEDURE — 93280 PM DEVICE PROGR EVAL DUAL: CPT | Performed by: INTERNAL MEDICINE

## 2023-10-06 NOTE — PROGRESS NOTES
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 BATTERY VOLTAGE ADEQUATE (1.5 YRS). AP 27%  68% (>40%/CHB) ALL LEAD PARAMETERS WITHIN NORMAL LIMITS. NO NEW SIGNIFICANT HIGH RATE EPISODES. NO PROGRAMMING CHANGES MADE TO DEVICE PARAMETERS. NORMAL DEVICE FUNCTION.  AM/ROONEY

## 2023-10-12 DIAGNOSIS — K59.00 CONSTIPATION, UNSPECIFIED CONSTIPATION TYPE: ICD-10-CM

## 2023-10-12 RX ORDER — POLYETHYLENE GLYCOL 3350 17 G/17G
POWDER, FOR SOLUTION ORAL
Qty: 510 G | Refills: 2 | Status: SHIPPED | OUTPATIENT
Start: 2023-10-12

## 2023-11-03 ENCOUNTER — OFFICE VISIT (OUTPATIENT)
Dept: FAMILY MEDICINE CLINIC | Facility: CLINIC | Age: 88
End: 2023-11-03
Payer: COMMERCIAL

## 2023-11-03 VITALS
SYSTOLIC BLOOD PRESSURE: 160 MMHG | BODY MASS INDEX: 27.57 KG/M2 | HEART RATE: 56 BPM | OXYGEN SATURATION: 98 % | DIASTOLIC BLOOD PRESSURE: 80 MMHG | HEIGHT: 73 IN | RESPIRATION RATE: 20 BRPM | TEMPERATURE: 98.2 F | WEIGHT: 208 LBS

## 2023-11-03 DIAGNOSIS — L98.9 SKIN LESION OF SCALP: Primary | ICD-10-CM

## 2023-11-03 DIAGNOSIS — Z23 INFLUENZA VACCINE NEEDED: ICD-10-CM

## 2023-11-03 PROCEDURE — G0008 ADMIN INFLUENZA VIRUS VAC: HCPCS | Performed by: NURSE PRACTITIONER

## 2023-11-03 PROCEDURE — 99213 OFFICE O/P EST LOW 20 MIN: CPT | Performed by: NURSE PRACTITIONER

## 2023-11-03 PROCEDURE — 90662 IIV NO PRSV INCREASED AG IM: CPT | Performed by: NURSE PRACTITIONER

## 2023-11-03 NOTE — PROGRESS NOTES
FAMILY PRACTICE OFFICE VISIT       NAME: Shelley Serna  AGE: 80 y.o. SEX: male       : 1935        MRN: [id number]    Assessment and Plan   1. Skin lesion of scalp  Irregular nevus appeared 2-3 weeks ago right frontal scalp. Recommend dermatology evaluation. Given information for 3 local dermatologists in the area. He plans to schedule. 2. Influenza vaccine needed  -     influenza vaccine, high-dose, PF 0.7 mL (FLUZONE HIGH-DOSE)         Chief Complaint     Chief Complaint   Patient presents with    Skin Problem     Patient is here for black spot on forehead 1 + mos       History of Present Illness     Shelley Serna is an 80year old male presenting today for skin lesion on scalp. Black spot on right scalp. Actinic keratosis on scalp long time now. Dermatology in the past.     Just started 3-4 weeks ago. Review of Systems   Review of Systems   Constitutional: Negative. Skin:         Skin lesion right scalp       I have reviewed the patient's medical history in detail; there are no changes to the history as noted in the electronic medical record. Objective     Vitals:    23 1351   BP: 160/80   Pulse: 56   Resp: 20   Temp: 98.2 °F (36.8 °C)   TempSrc: Temporal   SpO2: 98%   Weight: 94.3 kg (208 lb)   Height: 6' 1" (1.854 m)     Wt Readings from Last 3 Encounters:   23 94.3 kg (208 lb)   23 90.3 kg (199 lb)   23 90.3 kg (199 lb)     Physical Exam  Vitals and nursing note reviewed. Constitutional:       Appearance: Normal appearance. Skin:     Comments: Right frontal scalp with 3-4 mm flat dark brown skin lesion, irregular borders. Neurological:      Mental Status: He is alert.    Psychiatric:         Mood and Affect: Mood normal.            ALLERGIES:  No Known Allergies    Current Medications     Current Outpatient Medications   Medication Sig Dispense Refill    amLODIPine (NORVASC) 5 mg tablet TAKE 1 TABLET TWICE DAILY 60 tablet 10    B Complex Vitamins (VITAMIN B COMPLEX PO) Take 1 tablet by mouth daily L.D. 4/3 for sx      Cholecalciferol (VITAMIN D PO) Take 1 capsule by mouth daily L.D. 4/3 for sx      hydrochlorothiazide (MICROZIDE) 12.5 mg capsule TAKE 1 CAPSULE DAILY 90 capsule 3    metoprolol tartrate (LOPRESSOR) 25 mg tablet TAKE 1/2 TABLET EVERY 12 HOURS 90 tablet 3    polyethylene glycol (MIRALAX) 17 g packet Take 17 g by mouth daily at bedtime      polyethylene glycol (QC Natura-LAX) 17 GM/SCOOP powder TAKE 17 G (1 CAPFUL) BY MOUTH DAILY AS NEEDED (CONSTIPATION) - MIX WITH LIQUID BEFORE TAKING. 510 g 2    potassium chloride (K-DUR,KLOR-CON) 20 mEq tablet TAKE 2 TABLETS (40 MEQ TOTAL) BY MOUTH DAILY AS DIRECTED (Patient taking differently: Take 20 mEq by mouth daily Takes 1 tablet daily) 180 tablet 3    senna-docusate sodium (SENOKOT S) 8.6-50 mg per tablet Take 1 tablet by mouth 2 (two) times a day (Patient taking differently: Take 1 tablet by mouth if needed)  0    tamsulosin (FLOMAX) 0.4 mg TAKE 1 CAPSULE (0.4 MG TOTAL) BY MOUTH DAILY AT BEDTIME 30 capsule 5    diclofenac potassium (CATAFLAM) 50 mg tablet Take 1 tablet (50 mg total) by mouth 2 (two) times a day for 5 days 10 tablet 0     No current facility-administered medications for this visit.          Health Maintenance     Health Maintenance   Topic Date Due    SLP PLAN OF CARE  Never done    Falls: Plan of Care  Never done    BMI: Followup Plan  12/09/2020    COVID-19 Vaccine (3 - Pfizer series) 04/29/2021    Depression Remission PHQ  05/03/2024    Fall Risk  08/03/2024    Medicare Annual Wellness Visit (AWV)  08/03/2024    BMI: Adult  11/03/2024    Pneumococcal Vaccine: 65+ Years  Completed    Influenza Vaccine  Completed    HIB Vaccine  Aged Out    IPV Vaccine  Aged Out    Hepatitis A Vaccine  Aged Out    Meningococcal ACWY Vaccine  Aged Out    HPV Vaccine  Aged Out     Immunization History   Administered Date(s) Administered    COVID-19 PFIZER VACCINE 0.3 ML IM 02/12/2021, 03/04/2021    INFLUENZA 09/20/2014, 10/14/2016, 10/16/2017, 08/19/2018, 10/07/2019, 09/19/2021    Influenza Split High Dose Preservative Free IM 09/20/2014, 10/14/2016, 10/16/2017    Influenza, high dose seasonal 0.7 mL 11/03/2023    Influenza, seasonal, injectable 11/04/2013    Influenza, seasonal, injectable, preservative free 10/07/2019    Pneumococcal Conjugate 13-Valent 04/16/2015    Pneumococcal Polysaccharide PPV23 10/01/2009    Td (adult), adsorbed 08/01/2010    Tdap 04/16/2015    Zoster Vaccine Recombinant 07/09/2019, 09/07/2019    influenza, trivalent, adjuvanted 09/14/2019       YAW Leonardo

## 2023-11-15 NOTE — ED ATTENDING ATTESTATION
1/31/2023  IAmos MD, saw and evaluated the patient  I have discussed the patient with the resident/non-physician practitioner and agree with the resident's/non-physician practitioner's findings, Plan of Care, and MDM as documented in the resident's/non-physician practitioner's note, except where noted  All available labs and Radiology studies were reviewed  I was present for key portions of any procedure(s) performed by the resident/non-physician practitioner and I was immediately available to provide assistance  At this point I agree with the current assessment done in the Emergency Department    I have conducted an independent evaluation of this patient a history and physical is as follows:    ED Course         Critical Care Time  Procedures Isotretinoin Pregnancy And Lactation Text: This medication is Pregnancy Category X and is considered extremely dangerous during pregnancy. It is unknown if it is excreted in breast milk.

## 2024-01-07 ENCOUNTER — TELEPHONE (OUTPATIENT)
Dept: OTHER | Facility: OTHER | Age: 89
End: 2024-01-07

## 2024-01-07 NOTE — TELEPHONE ENCOUNTER
Patient is calling regarding cancelling an appointment.    Date/Time:1-8-2024 @ 11:45 am    Patient was rescheduled: YES [] NO [x]    Patient requesting call back to reschedule: YES [x] NO []

## 2024-01-08 ENCOUNTER — REMOTE DEVICE CLINIC VISIT (OUTPATIENT)
Dept: CARDIOLOGY CLINIC | Facility: CLINIC | Age: 89
End: 2024-01-08
Payer: COMMERCIAL

## 2024-01-08 DIAGNOSIS — Z95.0 CARDIAC PACEMAKER IN SITU: Primary | ICD-10-CM

## 2024-01-08 PROCEDURE — 93294 REM INTERROG EVL PM/LDLS PM: CPT | Performed by: STUDENT IN AN ORGANIZED HEALTH CARE EDUCATION/TRAINING PROGRAM

## 2024-01-08 PROCEDURE — 93296 REM INTERROG EVL PM/IDS: CPT | Performed by: STUDENT IN AN ORGANIZED HEALTH CARE EDUCATION/TRAINING PROGRAM

## 2024-01-08 NOTE — PROGRESS NOTES
Results for orders placed or performed in visit on 01/08/24   Cardiac EP device report    Narrative    BSC-DUAL CHAMBER PPM (DDD MODE)/ NOT MRI CONDITIONAL  LATITUDE TRANSMISSION: BATTERY VOLTAGE NEARING JAMI (8 MOS). WILL SCHEDULE MONTHLY BATTERY CHECKS. AP-45%, -86% (>40% CHB/DDD@50PPM). ALL AVAILABLE LEAD PARAMETERS WITHIN NORMAL LIMITS. NO SIGNIFICANT HIGH RATE EPISODES. FREQUENT PAC'S ON PRESENTING EGM. PT ON METOPROLOL. NORMAL DEVICE FUNCTION. GV

## 2024-02-08 ENCOUNTER — REMOTE DEVICE CLINIC VISIT (OUTPATIENT)
Dept: CARDIOLOGY CLINIC | Facility: CLINIC | Age: 89
End: 2024-02-08

## 2024-02-08 DIAGNOSIS — Z95.0 PRESENCE OF PERMANENT CARDIAC PACEMAKER: Primary | ICD-10-CM

## 2024-02-08 DIAGNOSIS — I10 ESSENTIAL (PRIMARY) HYPERTENSION: ICD-10-CM

## 2024-02-08 DIAGNOSIS — K59.00 CONSTIPATION, UNSPECIFIED CONSTIPATION TYPE: ICD-10-CM

## 2024-02-08 DIAGNOSIS — I47.10 SVT (SUPRAVENTRICULAR TACHYCARDIA): ICD-10-CM

## 2024-02-08 PROCEDURE — RECHECK: Performed by: STUDENT IN AN ORGANIZED HEALTH CARE EDUCATION/TRAINING PROGRAM

## 2024-02-08 RX ORDER — POLYETHYLENE GLYCOL 3350 17 G/17G
POWDER, FOR SOLUTION ORAL
Qty: 510 G | Refills: 2 | Status: SHIPPED | OUTPATIENT
Start: 2024-02-08

## 2024-02-08 NOTE — PROGRESS NOTES
Results for orders placed or performed in visit on 02/08/24   Cardiac EP device report    Narrative    BSC-DUAL CHAMBER PPM (DDD MODE)/ NOT MRI CONDITIONAL  LATITUDE TRANSMISSION: N/B BATTERY CHECK-- BATTERY VOLTAGE NEARING JAMI.  WILL SCHEDULE MONTHLY BATTERY CHECKS. ( 5 MONS) AP 46%  87%. ALL AVAILABLE LEAD PARAMETERS WITHIN NORMAL LIMITS. 9NO SIGNIFICANT HIGH RATE EPISODES. NORMAL DEVICE FUNCTION.---ROONEY

## 2024-02-09 RX ORDER — HYDROCHLOROTHIAZIDE 12.5 MG/1
CAPSULE, GELATIN COATED ORAL
Qty: 30 CAPSULE | Refills: 5 | Status: SHIPPED | OUTPATIENT
Start: 2024-02-09

## 2024-02-15 ENCOUNTER — TELEPHONE (OUTPATIENT)
Dept: CARDIOLOGY CLINIC | Facility: CLINIC | Age: 89
End: 2024-02-15

## 2024-02-15 NOTE — TELEPHONE ENCOUNTER
Franc Bañuelos May 26, 1935. I need it. I like to talk to somebody about my pacemaker. It seems to be that my VPN is gone up to about almost 90% and it's also declining at a faster rate. The battery. My phone number is [phone]

## 2024-02-16 NOTE — TELEPHONE ENCOUNTER
Patient called and all concerns with nearing JAMI battery voltage reviewed with verbal understanding.

## 2024-03-12 ENCOUNTER — REMOTE DEVICE CLINIC VISIT (OUTPATIENT)
Dept: CARDIOLOGY CLINIC | Facility: CLINIC | Age: 89
End: 2024-03-12

## 2024-03-12 DIAGNOSIS — Z95.0 PRESENCE OF CARDIAC PACEMAKER: Primary | ICD-10-CM

## 2024-03-12 PROCEDURE — RECHECK: Performed by: STUDENT IN AN ORGANIZED HEALTH CARE EDUCATION/TRAINING PROGRAM

## 2024-03-14 NOTE — PROGRESS NOTES
Virtual Regular Visit    Verification of patient location:    Patient is located in the following state in which I hold an active license [place]     My intent was to complete video visit, but patient was unable to make a video connection so we defaulted to phone  Assessment/Plan:    Problem List Items Addressed This Visit        Cardiovascular and Mediastinum    Aortic valve disease    LVH (left ventricular hypertrophy)    SVT (supraventricular tachycardia) (HCC)      Other Visit Diagnoses     Presence of permanent cardiac pacemaker    -  Primary    Essential (primary) hypertension                   Reason for visit is   Chief Complaint   Patient presents with    Virtual Brief Visit    Virtual Regular Visit    Follow-up     1 yr f/u; no cardiac related complaints        Encounter provider Sharon Francis MD    Provider located at [address]      Recent Visits  No visits were found meeting these conditions  Showing recent visits within past 7 days and meeting all other requirements  Today's Visits  Date Type Provider Dept   10/06/22 [address] [place] [address], MD [address] [place] [address] today's visits and meeting all other requirements  Future Appointments  No visits were found meeting these conditions  Showing future appointments within next 150 days and meeting all other requirements       The patient was identified by name and date of birth  Demi Hutton was informed that this is a telemedicine visit and that the visit is being conducted through Telephone  My office door was closed  No one else was in the room  He acknowledged consent and understanding of privacy and security of the video platform  The patient has agreed to participate and understands they can discontinue the visit at any time  Patient is aware this is a billable service       David Sanchez [place]  Patient is for a follow-up visit  He has a [employer] PPM   His most recent interrogation 9/2022 demonstrated an appropriately functioning device with no significant high rate episodes noted  A prior pacer interrogation in November of 2018 suggested the possibility of atrial flutter   A HM after that demonstrated sinus rhythm  An echocardiogram done 8/2021 demonstrated preserved LV systolic function with mild LVH and no significant valvular heart disease   On a chest CT scan done 10/2021 the patient was noted to have a 4 4 cm ascending aortic aneurysm which was stable compared to prior chest CT done in 2018  He has been well  He has had no chest pain or significant dyspnea  He has had some balance issues  He has recovered after breaking his hip  His blood pressure today is 127/65 with a pulse of 62  I have asked him to continue his cardiac medicines  We will continue to keep an eye on his pacemaker  I will see him in follow-up in six months time        Past Medical History:   Diagnosis Date    Balanitis 1/2/2020    Basal cell carcinoma     Bradycardia     Degenerative joint disease (DJD) of lumbar spine     Hypertension     Kidney stones     Pacemaker        Past Surgical History:   Procedure Laterality Date    CARDIAC PACEMAKER PLACEMENT      CHOLECYSTECTOMY      EYE SURGERY      VT PARTIAL HIP REPLACEMENT Right 10/10/2021    Procedure: HEMIARTHROPLASTY HIP (BIPOLAR), RIGHT;  Surgeon: Romy Bañuelos MD;  Location: [facility];  Service: Orthopedics    TONSILLECTOMY         Current Outpatient Medications   Medication Sig Dispense Refill    amLODIPine (NORVASC) 5 mg tablet TAKE 1 TABLET TWICE DAILY 60 tablet 10    B Complex Vitamins (VITAMIN B COMPLEX PO) Take 1 tablet by mouth daily      Cholecalciferol (VITAMIN D PO) Take 1 capsule by mouth daily       hydrochlorothiazide (MICROZIDE) 12 5 mg capsule TAKE 1 CAPSULE (12 5 MG TOTAL) BY MOUTH DAILY 30 capsule 0    metoprolol tartrate (LOPRESSOR) 25 mg tablet TAKE 1/2 TABLET EVERY 12 HOURS 30 tablet 11    Multiple Vitamins-Minerals (MULTIVITAMIN ADULT PO) Take 1 tablet by mouth daily      polyethylene glycol (MIRALAX) 17 g packet Take 17 g by mouth daily      potassium chloride (K-DUR,KLOR-CON) 20 mEq tablet TAKE 2 TABLETS (40 MEQ TOTAL) BY MOUTH DAILY AS DIRECTED (Patient taking differently: Take 40 mEq by mouth daily Patient is taking 1 20Meq tablet daily- is developing headaches w/ the 40meq) 60 tablet 0    acetaminophen (TYLENOL) 325 mg tablet Take 3 tablets (975 mg total) by mouth every 8 (eight) hours (Patient not taking: No sig reported)  0    bisacodyl (DULCOLAX) 5 mg EC tablet Take 1 tablet (5 mg total) by mouth daily as needed for constipation (Patient not taking: No sig reported) 10 tablet 0    enoxaparin (LOVENOX) 40 mg/0 4 mL Inject 0 4 mL (40 mg total) under the skin daily for 28 days (Patient not taking: Reported on 9/2/2022) 11 2 mL 0    gabapentin (NEURONTIN) 100 mg capsule Take 1 capsule (100 mg total) by mouth daily at bedtime (Patient not taking: No sig reported)  0    GaviLAX 17 GM/SCOOP powder TAKE 17 G (1 CAPFUL) BY MOUTH DAILY AS NEEDED (CONSTIPATION) - MIX WITH LIQUID BEFORE TAKING  (Patient not taking: No sig reported) 510 g 5    nitroglycerin (NITROSTAT) 0 4 mg SL tablet Place 1 tablet (0 4 mg total) under the tongue every 5 (five) minutes as needed for chest pain (Patient not taking: No sig reported) 25 tablet 2    senna-docusate sodium (SENOKOT S) 8 6-50 mg per tablet Take 1 tablet by mouth 2 (two) times a day (Patient not taking: No sig reported)  0    tamsulosin (FLOMAX) 0 4 mg TAKE 1 CAPSULE (0 4 MG TOTAL) BY MOUTH DAILY AT BEDTIME (Patient not taking: No sig reported) 30 capsule 5     No current facility-administered medications for this visit  No Known Allergies    Review of Systems   All other systems reviewed and are negative        Video Exam    Vitals:    10/06/22 1130   BP: 138/71   BP Location: Left arm   Patient Position: Sitting   Cuff Size: Standard   Pulse: 56   Weight: 93 4 kg (206 lb)           I spent 20 minutes directly with the patient during this visit Yes

## 2024-04-08 ENCOUNTER — REMOTE DEVICE CLINIC VISIT (OUTPATIENT)
Dept: CARDIOLOGY CLINIC | Facility: CLINIC | Age: 89
End: 2024-04-08

## 2024-04-08 DIAGNOSIS — Z95.0 CARDIAC PACEMAKER IN SITU: Primary | ICD-10-CM

## 2024-04-08 PROCEDURE — RECHECK: Performed by: STUDENT IN AN ORGANIZED HEALTH CARE EDUCATION/TRAINING PROGRAM

## 2024-04-08 NOTE — PROGRESS NOTES
Results for orders placed or performed in visit on 04/08/24   Cardiac EP device report    Narrative    BSC-DUAL CHAMBER PPM (DDD MODE)/ NOT MRI CONDITIONAL  LATITUDE TRANSMISSION: BATTERY VOLTAGE NEARING JAMI-3 MONS. WILL SCHEDULE MONTHLY BATTERY CHECKS. AP 43%  90%. ALL AVAILABLE LEAD PARAMETERS WITHIN NORMAL LIMITS. NO SIGNIFICANT HIGH RATE EPISODES. NORMAL DEVICE FUNCTION. NC

## 2024-05-09 DIAGNOSIS — K59.00 CONSTIPATION, UNSPECIFIED CONSTIPATION TYPE: ICD-10-CM

## 2024-05-09 DIAGNOSIS — I35.9 AORTIC VALVE DISEASE: ICD-10-CM

## 2024-05-09 RX ORDER — POLYETHYLENE GLYCOL 3350 17 G/17G
POWDER, FOR SOLUTION ORAL
Qty: 510 G | Refills: 1 | Status: SHIPPED | OUTPATIENT
Start: 2024-05-09

## 2024-05-10 ENCOUNTER — REMOTE DEVICE CLINIC VISIT (OUTPATIENT)
Dept: CARDIOLOGY CLINIC | Facility: CLINIC | Age: 89
End: 2024-05-10

## 2024-05-10 DIAGNOSIS — Z95.0 PRESENCE OF CARDIAC PACEMAKER: Primary | ICD-10-CM

## 2024-05-10 PROCEDURE — RECHECK: Performed by: INTERNAL MEDICINE

## 2024-05-10 NOTE — PROGRESS NOTES
Results for orders placed or performed in visit on 05/10/24   Cardiac EP device report    Narrative    BSC-DUAL CHAMBER PPM (DDD MODE)/ NOT MRI CONDITIONAL  NON-BILLABLE LATITUDE TRANSMISSION: BATTERY STATUS:  BATTERY VOLTAGE NEARING JAMI (3 MOS)  WILL SCHEDULE MONTHLY BATTERY CHECKS. AP: 42%. : 91% (>40% CHB). ALL AVAILABLE LEAD PARAMETERS WITHIN NORMAL LIMITS. 1 NON-SUSTV EPISODE W/ EMG SHOWING NSVT 6 BEATS @ 164 BPM. PT TAKES METOPORLOL TART. EF: 60% (ECHO 8/20/21). NORMAL DEVICE FUNCTION. CH         gradual onset/worsening

## 2024-05-14 RX ORDER — AMLODIPINE BESYLATE 5 MG/1
TABLET ORAL
Qty: 60 TABLET | Refills: 10 | Status: SHIPPED | OUTPATIENT
Start: 2024-05-14

## 2024-06-10 ENCOUNTER — REMOTE DEVICE CLINIC VISIT (OUTPATIENT)
Dept: CARDIOLOGY CLINIC | Facility: CLINIC | Age: 89
End: 2024-06-10

## 2024-06-10 DIAGNOSIS — Z95.0 PRESENCE OF PERMANENT CARDIAC PACEMAKER: Primary | ICD-10-CM

## 2024-06-10 PROCEDURE — RECHECK: Performed by: STUDENT IN AN ORGANIZED HEALTH CARE EDUCATION/TRAINING PROGRAM

## 2024-06-10 NOTE — PROGRESS NOTES
BSC DC PM (DDD MODE)/NOT MRI CONDITIONAL   NB LATITUDE TRANSMISSION:  BATTERY VOLTAGE NEARING JAMI (3 MONTHS).  WILL SCHEDULE MONTHLY BATTERY CHECKS.  AP 41%  92% (>40%/AVB/DDD 50 PPM, -250 MS).  ALL LEAD PARAMETERS WITHIN NORMAL LIMITS.  NO NEW HIGH RATE EPISODES.  NORMAL DEVICE FUNCTION.  RG

## 2024-06-25 ENCOUNTER — TELEPHONE (OUTPATIENT)
Dept: CARDIOLOGY CLINIC | Facility: CLINIC | Age: 89
End: 2024-06-25

## 2024-06-25 NOTE — TELEPHONE ENCOUNTER
Call from SonWiley with regards to concerns with battery voltage, pacemaker replacement and pacemaker dependency.     Reviewed battery performance of [employer] device, Insurance guidelines for RRT and generator revision insurance coverage. [facility]. Latitude transmitter function and next scheduled 1 mo battery check scheduled 7/8/24. Also instructed to send manual transmission with any concerns prior to the scheduled remote check.     Verbal understanding.

## 2024-07-08 ENCOUNTER — REMOTE DEVICE CLINIC VISIT (OUTPATIENT)
Dept: CARDIOLOGY CLINIC | Facility: CLINIC | Age: 89
End: 2024-07-08
Payer: COMMERCIAL

## 2024-07-08 DIAGNOSIS — Z95.0 PRESENCE OF PERMANENT CARDIAC PACEMAKER: Primary | ICD-10-CM

## 2024-07-08 PROCEDURE — 93294 REM INTERROG EVL PM/LDLS PM: CPT | Performed by: STUDENT IN AN ORGANIZED HEALTH CARE EDUCATION/TRAINING PROGRAM

## 2024-07-08 PROCEDURE — 93296 REM INTERROG EVL PM/IDS: CPT | Performed by: STUDENT IN AN ORGANIZED HEALTH CARE EDUCATION/TRAINING PROGRAM

## 2024-07-09 NOTE — PROGRESS NOTES
Results for orders placed or performed in visit on 07/08/24   Cardiac EP device report    Narrative    BSC DC PM (DDD MODE)/NOT MRI CONDITIONAL  LATITUDE TRANSMISSION: BATTERY VOLTAGE NEARING JAMI(< 3 MTHS). WILL SCHEDULE MONTHLY BATTERY CHECKS. AP 41%  92% (>40%/CHB) ALL AVAILABLE LEAD PARAMETERS WITHIN NORMAL LIMITS. NO NEW SIGNIFICANT HIGH RATE EPISODES. NORMAL DEVICE FUNCTION. AM

## 2024-07-29 ENCOUNTER — TELEPHONE (OUTPATIENT)
Dept: CARDIOLOGY CLINIC | Facility: CLINIC | Age: 89
End: 2024-07-29

## 2024-07-29 NOTE — TELEPHONE ENCOUNTER
Pt's device JAMI on 7/27/2024.  I called Wiley, pt's son, to advise.  We will be in touch with the pt to get H&P scheduled.  I will call the pt after 10 AM this morning to advise, then send message to EP Clerical.      Message left on pt's home phone to call back  Pt called-transferred to [place] to get sched for OV.  .

## 2024-08-08 DIAGNOSIS — I10 ESSENTIAL (PRIMARY) HYPERTENSION: ICD-10-CM

## 2024-08-08 DIAGNOSIS — I47.10 SVT (SUPRAVENTRICULAR TACHYCARDIA): ICD-10-CM

## 2024-08-09 RX ORDER — HYDROCHLOROTHIAZIDE 12.5 MG/1
CAPSULE, GELATIN COATED ORAL
Qty: 30 CAPSULE | Refills: 5 | Status: SHIPPED | OUTPATIENT
Start: 2024-08-09

## 2024-08-22 NOTE — PROGRESS NOTES
Consultation - Electrophysiology-Cardiology (EP)   Franc Bañuelos 89 y.o. male MRN: [id number]  Unit/Bed#:  Encounter: [id number]      1. Pacemaker at end of battery life  POCT ECG      2. Aortic valve disease        3. Hypertension, unspecified type        4. SVT (supraventricular tachycardia)        5. Aneurysm of ascending aorta without rupture (HCC)              Consults  Physician Requesting Consult: Self, Referral , PCP  Reason for Consult / Principal Problem: H&P for pacemaker [facility]         Summary of my recommendation for the patient  Proceed with generator change  [employer] dual-chamber device        Clinical conditions  Permanent pacemaker, at [facility]  SVT  Hypertension        Assessment & Plan     Permanent pacemaker, at [facility]  Device at elective replacement, July 27, 2024  Patient is V paced 93% of the time  Last echocardiogram, August 2021, EF 60%  Proceed with generator change      SVT  Patient is on metoprolol      Hypertension  Patient currently on metoprolol, hydrochlorothiazide, amlodipine          History of Present Illness   HPI: Franc Bañuelos is a 89 y.o. year old male has been referred to me by Dr Eller for the evaluation and management of pacemaker generator at [facility]    The patient has significant medical illnesses which include  Permanent pacemaker, at [facility]  SVT  Hypertension    Patient is on a wheelchair  He is not complaining of anginal-like chest pain, orthopnea, PND  He does have some chronic leg swelling  He is not complaining of palpitations, presyncope or syncope  He has limited exertional ability       Historical Information   Past Medical History:   Diagnosis Date    Aortic aneurysm (HCC) 2018    per pt-had echo    Balanitis 01/02/2020    Basal cell carcinoma     Bradycardia     Degenerative joint disease (DJD) of lumbar spine     Depression     Dizziness     Hypertension     Kidney stones     Pacemaker     Pneumonia 2018    Spinal stenosis     per pt     Past Surgical  History:   Procedure Laterality Date    CARDIAC PACEMAKER PLACEMENT      CHOLECYSTECTOMY      EYE SURGERY      FL RETROGRADE PYELOGRAM  2/1/2023    FL RETROGRADE PYELOGRAM  4/11/2023    VT CYSTO BLADDER W/URETERAL CATHETERIZATION Right 2/1/2023    Procedure: CYSTOSCOPY RETROGRADE PYELOGRAM WITH INSERTION STENT URETERAL;  Surgeon: Arturo Rubalcava MD;  Location: [facility];  Service: Urology    VT CYSTO/URETERO W/LITHOTRIPSY &INDWELL STENT INSRT Right 4/11/2023    Procedure: CYSTOSCOPY URETEROSCOPY WITH LITHOTRIPSY HOLMIUM LASER, RETROGRADE PYELOGRAM AND INSERTION STENT URETERAL, STONE BASKET EXTRACTION;  Surgeon: Arturo Rubalcava MD;  Location: [facility];  Service: Urology    VT HEMIARTHROPLASTY HIP PARTIAL Right 10/10/2021    Procedure: HEMIARTHROPLASTY HIP (BIPOLAR), RIGHT;  Surgeon: Gladys Sinclair MD;  Location: [facility];  Service: Orthopedics    TONSILLECTOMY       Social History     Substance and Sexual Activity   Alcohol Use Not Currently     Social History     Substance and Sexual Activity   Drug Use No     Social History     Tobacco Use   Smoking Status Former    Types: Cigarettes, Pipe, Cigars   Smokeless Tobacco Never   Tobacco Comments    Infrequently for brief period     Social History     Socioeconomic History    Marital status:      Spouse name: Not on file    Number of children: Not on file    Years of education: Not on file    Highest education level: Not on file   Occupational History    Occupation: Retired   Tobacco Use    Smoking status: Former     Types: Cigarettes, Pipe, Cigars    Smokeless tobacco: Never    Tobacco comments:     Infrequently for brief period   Vaping Use    Vaping status: Never Used   Substance and Sexual Activity    Alcohol use: Not Currently    Drug use: No    Sexual activity: Not Currently   Other Topics Concern    Not on file   Social History Narrative    Not on file     Social Determinants of Health     Financial Resource Strain: Low Risk  (8/3/2023)     "Overall Financial Resource Strain (CARDIA)     Difficulty of Paying Living Expenses: Not very hard   Food Insecurity: No Food Insecurity (2/2/2023)    Hunger Vital Sign     Worried About Running Out of Food in the Last Year: Never true     Ran Out of Food in the Last Year: Never true   Transportation Needs: No Transportation Needs (8/3/2023)    PRAPARE - Transportation     Lack of Transportation (Medical): No     Lack of Transportation (Non-Medical): No   Physical Activity: Not on file   Stress: Not on file   Social Connections: Not on file   Intimate Partner Violence: Not on file   Housing Stability: Low Risk  (2/2/2023)    Housing Stability Vital Sign     Unable to Pay for Housing in the Last Year: No     Number of Places Lived in the Last Year: 1     Unstable Housing in the Last Year: No     .  Family History:  Family History   Problem Relation Age of Onset    Diabetes Mother     Heart disease Mother          Meds/Allergies      No current facility-administered medications for this visit.        Not in a hospital admission.    No Known Allergies        Objective   Vitals: Visit Vitals  Ht 6' 1\" (1.854 m)   BMI 27.44 kg/m²   Smoking Status Former   BSA 2.19 m²      There were no vitals filed for this visit.[unfilled]    Invasive Devices       Drain  Duration             Ureteral Internal Stent 6 Fr. 568 days    Ureteral Internal Stent Right ureter 6 Fr. 500 days                      ROS  Review of Systems   All other systems reviewed and are negative.  As described in my history of present illness        PHYSICAL EXAM  Physical Exam  Vitals reviewed.   Constitutional:       General: He is not in acute distress.     Appearance: Normal appearance. He is normal weight. He is not ill-appearing.      Comments: Patient is in a wheelchair   HENT:      Head: Normocephalic and atraumatic.      Right Ear: External ear normal.      Left Ear: External ear normal.      Nose: Nose normal.      Mouth/Throat:      Pharynx: " Oropharynx is clear.   Eyes:      General: No scleral icterus.     Extraocular Movements: Extraocular movements intact.      Conjunctiva/sclera: Conjunctivae normal.      Pupils: Pupils are equal, round, and reactive to light.   Cardiovascular:      Rate and Rhythm: Normal rate and regular rhythm.      Heart sounds: Normal heart sounds. No murmur heard.     No gallop.   Pulmonary:      Effort: No respiratory distress.      Breath sounds: Normal breath sounds. No wheezing.   Abdominal:      General: Bowel sounds are normal. There is no distension.      Palpations: Abdomen is soft.      Tenderness: There is no abdominal tenderness. There is no guarding.   Musculoskeletal:         General: No swelling or deformity.      Cervical back: Neck supple. No rigidity.   Skin:     Coloration: Skin is not jaundiced.      Findings: No bruising or lesion.   Neurological:      Mental Status: He is alert and oriented to person, place, and time. Mental status is at baseline.      Motor: No weakness.   Psychiatric:         Mood and Affect: Mood normal.         Behavior: Behavior normal.         Thought Content: Thought content normal.         Judgment: Judgment normal.               LAB RESULTS:    CBC:  WBC   Date Value Ref Range Status   03/31/2023 8.77 4.31 - 10.16 Thousand/uL Final   04/08/2015 5.87 4.31 - 10.16 Thousand/uL Final     Hemoglobin   Date Value Ref Range Status   03/31/2023 14.3 12.0 - 17.0 g/dL Final   04/08/2015 12.0 12.0 - 17.0 g/dL Final     Hematocrit   Date Value Ref Range Status   03/31/2023 42.7 36.5 - 49.3 % Final   04/08/2015 35.0 (L) 36.5 - 49.3 % Final     MCV   Date Value Ref Range Status   03/31/2023 101 (H) 82 - 98 fL Final   04/08/2015 96 82 - 98 fL Final     Platelets   Date Value Ref Range Status   03/31/2023 265 149 - 390 Thousands/uL Final   04/08/2015 145 (L) 149 - 390 Thousand/uL Final     RBC   Date Value Ref Range Status   03/31/2023 4.24 3.88 - 5.62 Million/uL Final   04/08/2015 3.63 (L) 3.88  - 5.62 Million/uL Final     MCH   Date Value Ref Range Status   03/31/2023 33.7 26.8 - 34.3 pg Final   04/08/2015 33.1 26.8 - 34.3 pg Final     MCHC   Date Value Ref Range Status   03/31/2023 33.5 31.4 - 37.4 g/dL Final   04/08/2015 34.3 31.4 - 37.4 g/dL Final     RDW   Date Value Ref Range Status   03/31/2023 13.8 11.6 - 15.1 % Final   04/08/2015 13.7 11.6 - 15.1 % Final     MPV   Date Value Ref Range Status   03/31/2023 12.5 8.9 - 12.7 fL Final   04/08/2015 10.6 8.9 - 12.7 fL Final     nRBC   Date Value Ref Range Status   03/31/2023 0 /100 WBCs Final       CMP:  Sodium   Date Value Ref Range Status   04/08/2015 142 136 - 145 mmol/L Final     Potassium   Date Value Ref Range Status   03/31/2023 4.0 3.5 - 5.3 mmol/L Final   04/08/2015 3.1 (L) 3.5 - 5.3 mmol/L Final     Chloride   Date Value Ref Range Status   03/31/2023 105 96 - 108 mmol/L Final   04/08/2015 109 (H) 100 - 108 mmol/L Final     CO2   Date Value Ref Range Status   03/31/2023 27 21 - 32 mmol/L Final   04/08/2015 24 23 - 33 mmol/L Final     Anion Gap   Date Value Ref Range Status   04/08/2015 9 4 - 13 mmol/L Final     BUN   Date Value Ref Range Status   03/31/2023 18 5 - 25 mg/dL Final   04/08/2015 23 5 - 25 mg/dL Final     Creatinine   Date Value Ref Range Status   03/31/2023 0.66 0.60 - 1.30 mg/dL Final     Comment:     Standardized to IDMS reference method   04/08/2015 0.64 0.60 - 1.30 mg/dL Final     Comment:     Standardized to IDMS reference method     Glucose   Date Value Ref Range Status   04/08/2015 114 65 - 140 mg/dL Final     Comment:     If patient is fasting, the ADA then defines impaired fasting glucose as  >100 mg/dl and diabetes as  >or equal to 126 mg/dl.       Calcium   Date Value Ref Range Status   03/31/2023 9.6 8.3 - 10.1 mg/dL Final   04/08/2015 7.8 (L) 8.3 - 10.1 mg/dL Final     AST   Date Value Ref Range Status   01/31/2023 25 13 - 39 U/L Final     Comment:     Specimen collection should occur prior to Sulfasalazine administration  "due to the potential for falsely depressed results.    04/07/2015 51 (H) 0 - 45 U/L Final     ALT   Date Value Ref Range Status   01/31/2023 23 7 - 52 U/L Final     Comment:     Specimen collection should occur prior to Sulfasalazine administration due to the potential for falsely depressed results.    04/07/2015 44 16 - 63 U/L Final     Alkaline Phosphatase   Date Value Ref Range Status   01/31/2023 55 34 - 104 U/L Final   04/07/2015 56 46 - 116 U/L Final     Total Protein   Date Value Ref Range Status   04/07/2015 6.7 6.4 - 8.2 g/dL Final     Total Bilirubin   Date Value Ref Range Status   04/07/2015 0.78 0.20 - 1.00 mg/dL Final     eGFR   Date Value Ref Range Status   03/31/2023 86 ml/min/1.73sq m Final        Magnesium:   Magnesium   Date Value Ref Range Status   02/01/2023 1.8 (L) 1.9 - 2.7 mg/dL Final        A1C:  No results found for: \"HGBA1C\"     TSH:  TSH 3RD GENERATON   Date Value Ref Range Status   04/05/2021 3.890 (H) 0.358 - 3.740 uIU/mL Final        PT/INR:  Protime   Date Value Ref Range Status   10/10/2021 13.7 11.6 - 14.5 seconds Final   03/24/2014 14.0 11.8 - 14.1 Seconds Final     INR   Date Value Ref Range Status   10/10/2021 1.05 0.84 - 1.19 Final   03/24/2014 1.13 0.86 - 1.16 Final     Comment:     The above 2 analytes were performed by [address] [address]         Lipid Panel:  Cholesterol   Date Value Ref Range Status   04/05/2021 135 50 - 200 mg/dL Final     Comment:     Cholesterol:       Desirable         <200 mg/dl       Borderline         200-239 mg/dl       High              >239            Triglycerides   Date Value Ref Range Status   04/05/2021 48 <=150 mg/dL Final     Comment:     Triglyceride:     Normal          <150 mg/dl     Borderline High 150-199 mg/dl     High            200-499 mg/dl        Very High       >499 mg/dl    Specimen collection should occur prior to N-Acetylcysteine or Metamizole administration due to the potential for falsely depressed " results.   2014 48 mg/dL Final     Comment:     TRIGLYCERIDE:       Normal                 <150 mg/dl       Borderline High       150-199 mg/dl       High                  200-499 mg/dl       Very High             >499 mg/dl  _______________________________________       HDL   Date Value Ref Range Status   2014 47 mg/dL Final     Comment:     HDL:       High       >59 mg/dl       Low        <41 mg/dl  ______________________________       HDL, Direct   Date Value Ref Range Status   2021 64 >=40 mg/dL Final     Comment:     HDL Cholesterol:       Low     <41 mg/dL  Specimen collection should occur prior to Metamizole administration due to the potential for falsley depressed results.     Non-HDL-Chol (CHOL-HDL)   Date Value Ref Range Status   2021 71 mg/dl Final       Troponin:  Troponin I   Date Value Ref Range Status   10/10/2021 <0.02 <=0.04 ng/mL Final     Comment:     Autovalidation override  [employer] Chemistry analyzer 99% cutoff is > 0.04 ng/mL in network labs     o cTnI 99% cutoff is useful only when applied to patients in the clinical setting of myocardial ischemia   o cTnI 99% cutoff should be interpreted in the context of clinical history, ECG findings and possibly cardiac imaging to establish correct diagnosis.   o cTnI 99% cutoff may be suggestive but clearly not indicative of a coronary event without the clinical setting of myocardial ischemia.           Imaging:    [id number]        DERIAN:  No results found for this or any previous visit.      Echocardiogram:  Results for orders placed during the hospital encounter of 21    Echo complete with contrast if indicated    [facility]  [address]  [place] [address] [phone]    Transthoracic Echocardiogram  2D, M-mode, Doppler, and Color Doppler    Study date:  20-Aug-2021    Patient: JOSE DAVID KINGSTON  MR number: [id number]  Account number: [id number]  : 1935  Age: 86  years  Gender: Male  Status: Outpatient  Location: [facility]  Height: 73 in  Weight: 196.7 lb  BP: 124/ 78 mmHg    Indications: Assess left ventricular function. Assess left ventricular hypertrophy.    Sonographer:  DEBORA Mix  Primary Physician:  Anirudh Flowers MD  Referring Physician:  Taqueria Eller MD  Group:  [facility]  Interpreting Physician:  Jonathon Sanchez MD    SUMMARY    LEFT VENTRICLE:  Systolic function was normal. Ejection fraction was estimated to be 60 %.  There was hypokinesis of the basal inferior wall(s).  Wall thickness was mildly increased.  Features were consistent with a pseudonormal left ventricular filling pattern, with concomitant abnormal relaxation and increased filling pressure (grade 2 diastolic dysfunction).    RIGHT VENTRICLE:  The size was normal.  Systolic function was normal.    TRICUSPID VALVE:  There was trace regurgitation.    HISTORY: PRIOR HISTORY: Hypertension, LVH, SVT, s/p PPM, AV disease non-specific    PROCEDURE: The study was performed in the [facility]. This was a routine study. The transthoracic approach was used. The study included complete 2D imaging, M-mode, complete spectral Doppler, and color Doppler. The  heart rate was 77 bpm, at the start of the study. Images were obtained from the parasternal, apical, subcostal, and suprasternal notch acoustic windows. Image quality was adequate.    LEFT VENTRICLE: Size was normal. Systolic function was normal. Ejection fraction was estimated to be 60 %. There was hypokinesis of the basal inferior wall(s). Cardiac wall motion was otherwise normal. Wall thickness was mildly increased.  DOPPLER: Features were consistent with a pseudonormal left ventricular filling pattern, with concomitant abnormal relaxation and increased filling pressure (grade 2 diastolic dysfunction).    RIGHT VENTRICLE: The size was normal. Systolic function was normal. Wall  thickness was normal.    LEFT ATRIUM: Size was normal.    RIGHT ATRIUM: Size was normal.    MITRAL VALVE: Valve structure was normal. There was normal leaflet separation. DOPPLER: The transmitral velocity was within the normal range. There was no evidence for stenosis. There was no significant regurgitation.    AORTIC VALVE: The valve was trileaflet. Leaflets exhibited normal thickness and normal cuspal separation. DOPPLER: Transaortic velocity was within the normal range. There was no evidence for stenosis. There was no significant  regurgitation.    TRICUSPID VALVE: The valve structure was normal. There was normal leaflet separation. DOPPLER: The transtricuspid velocity was within the normal range. There was no evidence for stenosis. There was trace regurgitation. Estimated peak PA  pressure was 20 mmHg.    PULMONIC VALVE: Leaflets exhibited normal thickness, no calcification, and normal cuspal separation. DOPPLER: The transpulmonic velocity was within the normal range. There was no significant regurgitation.    PERICARDIUM: There was no pericardial effusion. The pericardium was normal in appearance.    AORTA: The root exhibited normal size.    SYSTEMIC VEINS: IVC: The inferior vena cava was normal in size.    SYSTEM MEASUREMENT TABLES    2D  %FS: 25.39 %  Ao Diam: 3.69 cm  Ao asc: 3.67 cm  EDV(Teich): 59.86 ml  EF(Teich): 50.86 %  ESV(Teich): 29.42 ml  IVSd: 1.22 cm  LA Area: 20.72 cm2  LA Diam: 2.98 cm  LVEDV MOD A4C: 70.49 ml  LVEF MOD A4C: 67.93 %  LVESV MOD A4C: 22.6 ml  LVIDd: 3.75 cm  LVIDs: 2.79 cm  LVLd A4C: 7.91 cm  LVLs A4C: 6.44 cm  LVPWd: 1.21 cm  RA Area: 14.58 cm2  RVIDd: 3.37 cm  SV MOD A4C: 47.89 ml  SV(Teich): 30.44 ml    CW  TR MaxP.31 mmHg  TR Vmax: 2.08 m/s    MM  TAPSE: 2.46 cm    PW  E' Sept: 0.08 m/s  E/E' Sept: 8.48  MV A [place]ile[place]: 0.68 m/s  MV Dec [place]: 3.36 m/s2  MV DecT: 198.11 ms  MV E Dileep: 0.67 m/s  MV E/A Ratio: 0.98  MV PHT: 57.45 ms  MVA By PHT: 3.83 cm2    Intersocietal  Commission Accredited Echocardiography Laboratory    Prepared and electronically signed by    Jonathon Sanchez MD  Signed 20-Aug-2021 16:23:14      Stress Test:   No results found for this or any previous visit.      Cardiac Catheterization:  No results found for this or any previous visit.      HOLTER MONITOR: 24 HOUR/48 HOUR MONITORS    24 Hour  11/13/2018    IMPRESSION:  Predominantly normal sinus rhythm throughout the monitoring interval at a controlled rate. Patient does have a background electronic pacemaker which was functioning appropriately.        2.   Low density of nonsustained ventricular ectopy.        3.   Low density of nonsustained premature supraventricular ectopy.        4.   No significant bradycardia.        5.   No symptoms reported during the monitoring interval.        AMB Extended Holter Monitor: Zio XT/AT or BioTel  No results found for this or any previous visit.        DEVICE CHECK:     Results for orders placed or performed in visit on 07/28/24   Cardiac EP device report    Narrative    BSC DC PM (DDD MODE)/NOT MRI CONDITIONAL  Jul 27, 2024 22:29 - Yellow Alert - Explant indicator reached on Jul 27, 2024 22:29. Schedule replacement of this device.  LATITUDE TRANSMISSION: BATTERY STATUS EXPLANT INDICATOR REACHED ON 7/27/2024. AP 41%  93%. ALL AVAILABLE LEAD PARAMETERS WITHIN NORMAL LIMITS. NO SIGNIFICANT HIGH RATE EPISODES. PT TAKES METOPROLOL TART. EF 60% (ECHO 8/20/2021).  NORMAL DEVICE FUNCTION.  PT OF DR PARMAR. PT & SON WILL BE CONTACTED TO SCHED H&P. PAS/ALDO              Code Status: [unfilled]  Advance Directive and Living Will:      Power of :    POLST:      Counseling / Coordination of Care  Please set up for generator change      Ashu Rutherford MD

## 2024-08-22 NOTE — H&P (VIEW-ONLY)
Consultation - Electrophysiology-Cardiology (EP)   Franc Bañuelos 89 y.o. male MRN: [id number]  Unit/Bed#:  Encounter: [id number]      1. Pacemaker at end of battery life  POCT ECG      2. Aortic valve disease        3. Hypertension, unspecified type        4. SVT (supraventricular tachycardia)        5. Aneurysm of ascending aorta without rupture (HCC)              Consults  Physician Requesting Consult: Self, Referral , PCP  Reason for Consult / Principal Problem: H&P for pacemaker [facility]         Summary of my recommendation for the patient  Proceed with generator change  [employer] dual-chamber device        Clinical conditions  Permanent pacemaker, at [facility]  SVT  Hypertension        Assessment & Plan     Permanent pacemaker, at [facility]  Device at elective replacement, July 27, 2024  Patient is V paced 93% of the time  Last echocardiogram, August 2021, EF 60%  Proceed with generator change      SVT  Patient is on metoprolol      Hypertension  Patient currently on metoprolol, hydrochlorothiazide, amlodipine          History of Present Illness   HPI: Franc Bañuelos is a 89 y.o. year old male has been referred to me by Dr Eller for the evaluation and management of pacemaker generator at [facility]    The patient has significant medical illnesses which include  Permanent pacemaker, at [facility]  SVT  Hypertension    Patient is on a wheelchair  He is not complaining of anginal-like chest pain, orthopnea, PND  He does have some chronic leg swelling  He is not complaining of palpitations, presyncope or syncope  He has limited exertional ability       Historical Information   Past Medical History:   Diagnosis Date    Aortic aneurysm (HCC) 2018    per pt-had echo    Balanitis 01/02/2020    Basal cell carcinoma     Bradycardia     Degenerative joint disease (DJD) of lumbar spine     Depression     Dizziness     Hypertension     Kidney stones     Pacemaker     Pneumonia 2018    Spinal stenosis     per pt     Past Surgical  History:   Procedure Laterality Date    CARDIAC PACEMAKER PLACEMENT      CHOLECYSTECTOMY      EYE SURGERY      FL RETROGRADE PYELOGRAM  2/1/2023    FL RETROGRADE PYELOGRAM  4/11/2023    KY CYSTO BLADDER W/URETERAL CATHETERIZATION Right 2/1/2023    Procedure: CYSTOSCOPY RETROGRADE PYELOGRAM WITH INSERTION STENT URETERAL;  Surgeon: Arturo Rubalcava MD;  Location: [facility];  Service: Urology    KY CYSTO/URETERO W/LITHOTRIPSY &INDWELL STENT INSRT Right 4/11/2023    Procedure: CYSTOSCOPY URETEROSCOPY WITH LITHOTRIPSY HOLMIUM LASER, RETROGRADE PYELOGRAM AND INSERTION STENT URETERAL, STONE BASKET EXTRACTION;  Surgeon: Arturo Rubalcava MD;  Location[facility];  Service: Urology    KY HEMIARTHROPLASTY HIP PARTIAL Right 10/10/2021    Procedure: HEMIARTHROPLASTY HIP (BIPOLAR), RIGHT;  Surgeon: Gladys Sinclair MD;  Location: [facility];  Service: Orthopedics    TONSILLECTOMY       Social History     Substance and Sexual Activity   Alcohol Use Not Currently     Social History     Substance and Sexual Activity   Drug Use No     Social History     Tobacco Use   Smoking Status Former    Types: Cigarettes, Pipe, Cigars   Smokeless Tobacco Never   Tobacco Comments    Infrequently for brief period     Social History     Socioeconomic History    Marital status:      Spouse name: Not on file    Number of children: Not on file    Years of education: Not on file    Highest education level: Not on file   Occupational History    Occupation: Retired   Tobacco Use    Smoking status: Former     Types: Cigarettes, Pipe, Cigars    Smokeless tobacco: Never    Tobacco comments:     Infrequently for brief period   Vaping Use    Vaping status: Never Used   Substance and Sexual Activity    Alcohol use: Not Currently    Drug use: No    Sexual activity: Not Currently   Other Topics Concern    Not on file   Social History Narrative    Not on file     Social Determinants of Health     Financial Resource Strain: Low Risk  (8/3/2023)     "Overall Financial Resource Strain (CARDIA)     Difficulty of Paying Living Expenses: Not very hard   Food Insecurity: No Food Insecurity (2/2/2023)    Hunger Vital Sign     Worried About Running Out of Food in the Last Year: Never true     Ran Out of Food in the Last Year: Never true   Transportation Needs: No Transportation Needs (8/3/2023)    PRAPARE - Transportation     Lack of Transportation (Medical): No     Lack of Transportation (Non-Medical): No   Physical Activity: Not on file   Stress: Not on file   Social Connections: Not on file   Intimate Partner Violence: Not on file   Housing Stability: Low Risk  (2/2/2023)    Housing Stability Vital Sign     Unable to Pay for Housing in the Last Year: No     Number of Places Lived in the Last Year: 1     Unstable Housing in the Last Year: No     .  Family History:  Family History   Problem Relation Age of Onset    Diabetes Mother     Heart disease Mother          Meds/Allergies      No current facility-administered medications for this visit.        Not in a hospital admission.    No Known Allergies        Objective   Vitals: Visit Vitals  Ht 6' 1\" (1.854 m)   BMI 27.44 kg/m²   Smoking Status Former   BSA 2.19 m²      There were no vitals filed for this visit.[unfilled]    Invasive Devices       Drain  Duration             Ureteral Internal Stent 6 Fr. 568 days    Ureteral Internal Stent Right ureter 6 Fr. 500 days                      ROS  Review of Systems   All other systems reviewed and are negative.  As described in my history of present illness        PHYSICAL EXAM  Physical Exam  Vitals reviewed.   Constitutional:       General: He is not in acute distress.     Appearance: Normal appearance. He is normal weight. He is not ill-appearing.      Comments: Patient is in a wheelchair   HENT:      Head: Normocephalic and atraumatic.      Right Ear: External ear normal.      Left Ear: External ear normal.      Nose: Nose normal.      Mouth/Throat:      Pharynx: " Oropharynx is clear.   Eyes:      General: No scleral icterus.     Extraocular Movements: Extraocular movements intact.      Conjunctiva/sclera: Conjunctivae normal.      Pupils: Pupils are equal, round, and reactive to light.   Cardiovascular:      Rate and Rhythm: Normal rate and regular rhythm.      Heart sounds: Normal heart sounds. No murmur heard.     No gallop.   Pulmonary:      Effort: No respiratory distress.      Breath sounds: Normal breath sounds. No wheezing.   Abdominal:      General: Bowel sounds are normal. There is no distension.      Palpations: Abdomen is soft.      Tenderness: There is no abdominal tenderness. There is no guarding.   Musculoskeletal:         General: No swelling or deformity.      Cervical back: Neck supple. No rigidity.   Skin:     Coloration: Skin is not jaundiced.      Findings: No bruising or lesion.   Neurological:      Mental Status: He is alert and oriented to person, place, and time. Mental status is at baseline.      Motor: No weakness.   Psychiatric:         Mood and Affect: Mood normal.         Behavior: Behavior normal.         Thought Content: Thought content normal.         Judgment: Judgment normal.               LAB RESULTS:    CBC:  WBC   Date Value Ref Range Status   03/31/2023 8.77 4.31 - 10.16 Thousand/uL Final   04/08/2015 5.87 4.31 - 10.16 Thousand/uL Final     Hemoglobin   Date Value Ref Range Status   03/31/2023 14.3 12.0 - 17.0 g/dL Final   04/08/2015 12.0 12.0 - 17.0 g/dL Final     Hematocrit   Date Value Ref Range Status   03/31/2023 42.7 36.5 - 49.3 % Final   04/08/2015 35.0 (L) 36.5 - 49.3 % Final     MCV   Date Value Ref Range Status   03/31/2023 101 (H) 82 - 98 fL Final   04/08/2015 96 82 - 98 fL Final     Platelets   Date Value Ref Range Status   03/31/2023 265 149 - 390 Thousands/uL Final   04/08/2015 145 (L) 149 - 390 Thousand/uL Final     RBC   Date Value Ref Range Status   03/31/2023 4.24 3.88 - 5.62 Million/uL Final   04/08/2015 3.63 (L) 3.88  - 5.62 Million/uL Final     MCH   Date Value Ref Range Status   03/31/2023 33.7 26.8 - 34.3 pg Final   04/08/2015 33.1 26.8 - 34.3 pg Final     MCHC   Date Value Ref Range Status   03/31/2023 33.5 31.4 - 37.4 g/dL Final   04/08/2015 34.3 31.4 - 37.4 g/dL Final     RDW   Date Value Ref Range Status   03/31/2023 13.8 11.6 - 15.1 % Final   04/08/2015 13.7 11.6 - 15.1 % Final     MPV   Date Value Ref Range Status   03/31/2023 12.5 8.9 - 12.7 fL Final   04/08/2015 10.6 8.9 - 12.7 fL Final     nRBC   Date Value Ref Range Status   03/31/2023 0 /100 WBCs Final       CMP:  Sodium   Date Value Ref Range Status   04/08/2015 142 136 - 145 mmol/L Final     Potassium   Date Value Ref Range Status   03/31/2023 4.0 3.5 - 5.3 mmol/L Final   04/08/2015 3.1 (L) 3.5 - 5.3 mmol/L Final     Chloride   Date Value Ref Range Status   03/31/2023 105 96 - 108 mmol/L Final   04/08/2015 109 (H) 100 - 108 mmol/L Final     CO2   Date Value Ref Range Status   03/31/2023 27 21 - 32 mmol/L Final   04/08/2015 24 23 - 33 mmol/L Final     Anion Gap   Date Value Ref Range Status   04/08/2015 9 4 - 13 mmol/L Final     BUN   Date Value Ref Range Status   03/31/2023 18 5 - 25 mg/dL Final   04/08/2015 23 5 - 25 mg/dL Final     Creatinine   Date Value Ref Range Status   03/31/2023 0.66 0.60 - 1.30 mg/dL Final     Comment:     Standardized to IDMS reference method   04/08/2015 0.64 0.60 - 1.30 mg/dL Final     Comment:     Standardized to IDMS reference method     Glucose   Date Value Ref Range Status   04/08/2015 114 65 - 140 mg/dL Final     Comment:     If patient is fasting, the ADA then defines impaired fasting glucose as  >100 mg/dl and diabetes as  >or equal to 126 mg/dl.       Calcium   Date Value Ref Range Status   03/31/2023 9.6 8.3 - 10.1 mg/dL Final   04/08/2015 7.8 (L) 8.3 - 10.1 mg/dL Final     AST   Date Value Ref Range Status   01/31/2023 25 13 - 39 U/L Final     Comment:     Specimen collection should occur prior to Sulfasalazine administration  "due to the potential for falsely depressed results.    04/07/2015 51 (H) 0 - 45 U/L Final     ALT   Date Value Ref Range Status   01/31/2023 23 7 - 52 U/L Final     Comment:     Specimen collection should occur prior to Sulfasalazine administration due to the potential for falsely depressed results.    04/07/2015 44 16 - 63 U/L Final     Alkaline Phosphatase   Date Value Ref Range Status   01/31/2023 55 34 - 104 U/L Final   04/07/2015 56 46 - 116 U/L Final     Total Protein   Date Value Ref Range Status   04/07/2015 6.7 6.4 - 8.2 g/dL Final     Total Bilirubin   Date Value Ref Range Status   04/07/2015 0.78 0.20 - 1.00 mg/dL Final     eGFR   Date Value Ref Range Status   03/31/2023 86 ml/min/1.73sq m Final        Magnesium:   Magnesium   Date Value Ref Range Status   02/01/2023 1.8 (L) 1.9 - 2.7 mg/dL Final        A1C:  No results found for: \"HGBA1C\"     TSH:  TSH 3RD GENERATON   Date Value Ref Range Status   04/05/2021 3.890 (H) 0.358 - 3.740 uIU/mL Final        PT/INR:  Protime   Date Value Ref Range Status   10/10/2021 13.7 11.6 - 14.5 seconds Final   03/24/2014 14.0 11.8 - 14.1 Seconds Final     INR   Date Value Ref Range Status   10/10/2021 1.05 0.84 - 1.19 Final   03/24/2014 1.13 0.86 - 1.16 Final     Comment:     The above 2 analytes were performed by [address] [id number]         Lipid Panel:  Cholesterol   Date Value Ref Range Status   04/05/2021 135 50 - 200 mg/dL Final     Comment:     Cholesterol:       Desirable         <200 mg/dl       Borderline         200-239 mg/dl       High              >239            Triglycerides   Date Value Ref Range Status   04/05/2021 48 <=150 mg/dL Final     Comment:     Triglyceride:     Normal          <150 mg/dl     Borderline High 150-199 mg/dl     High            200-499 mg/dl        Very High       >499 mg/dl    Specimen collection should occur prior to N-Acetylcysteine or Metamizole administration due to the potential for falsely depressed " results.   2014 48 mg/dL Final     Comment:     TRIGLYCERIDE:       Normal                 <150 mg/dl       Borderline High       150-199 mg/dl       High                  200-499 mg/dl       Very High             >499 mg/dl  _______________________________________       HDL   Date Value Ref Range Status   2014 47 mg/dL Final     Comment:     HDL:       High       >59 mg/dl       Low        <41 mg/dl  ______________________________       HDL, Direct   Date Value Ref Range Status   2021 64 >=40 mg/dL Final     Comment:     HDL Cholesterol:       Low     <41 mg/dL  Specimen collection should occur prior to Metamizole administration due to the potential for falsley depressed results.     Non-HDL-Chol (CHOL-HDL)   Date Value Ref Range Status   2021 71 mg/dl Final       Troponin:  Troponin I   Date Value Ref Range Status   10/10/2021 <0.02 <=0.04 ng/mL Final     Comment:     Autovalidation override  [employer] Chemistry analyzer 99% cutoff is > 0.04 ng/mL in network labs     o cTnI 99% cutoff is useful only when applied to patients in the clinical setting of myocardial ischemia   o cTnI 99% cutoff should be interpreted in the context of clinical history, ECG findings and possibly cardiac imaging to establish correct diagnosis.   o cTnI 99% cutoff may be suggestive but clearly not indicative of a coronary event without the clinical setting of myocardial ischemia.           Imaging:    [id number]        DERIAN:  No results found for this or any previous visit.      Echocardiogram:  Results for orders placed during the hospital encounter of 21    Echo complete with contrast if indicated    [facility]  [address]  [place] [address] [phone]    Transthoracic Echocardiogram  2D, M-mode, Doppler, and Color Doppler    Study date:  20-Aug-2021    Patient: JOSE DAVID KINGSTON  MR number: [id number]  Account number: [id number]  : 1935  Age: 86  years  Gender: Male  Status: Outpatient  Location: [facility]  Height: 73 in  Weight: 196.7 lb  BP: 124/ 78 mmHg    Indications: Assess left ventricular function. Assess left ventricular hypertrophy.    Sonographer:  DEBORA Mix  Primary Physician:  Anirudh Flowers MD  Referring Physician:  Taqueria Eller MD  Group:  [facility]  Interpreting Physician:  Jonathon Sanchez MD    SUMMARY    LEFT VENTRICLE:  Systolic function was normal. Ejection fraction was estimated to be 60 %.  There was hypokinesis of the basal inferior wall(s).  Wall thickness was mildly increased.  Features were consistent with a pseudonormal left ventricular filling pattern, with concomitant abnormal relaxation and increased filling pressure (grade 2 diastolic dysfunction).    RIGHT VENTRICLE:  The size was normal.  Systolic function was normal.    TRICUSPID VALVE:  There was trace regurgitation.    HISTORY: PRIOR HISTORY: Hypertension, LVH, SVT, s/p PPM, AV disease non-specific    PROCEDURE: The study was performed in the [facility]. This was a routine study. The transthoracic approach was used. The study included complete 2D imaging, M-mode, complete spectral Doppler, and color Doppler. The  heart rate was 77 bpm, at the start of the study. Images were obtained from the parasternal, apical, subcostal, and suprasternal notch acoustic windows. Image quality was adequate.    LEFT VENTRICLE: Size was normal. Systolic function was normal. Ejection fraction was estimated to be 60 %. There was hypokinesis of the basal inferior wall(s). Cardiac wall motion was otherwise normal. Wall thickness was mildly increased.  DOPPLER: Features were consistent with a pseudonormal left ventricular filling pattern, with concomitant abnormal relaxation and increased filling pressure (grade 2 diastolic dysfunction).    RIGHT VENTRICLE: The size was normal. Systolic function was normal. Wall  thickness was normal.    LEFT ATRIUM: Size was normal.    RIGHT ATRIUM: Size was normal.    MITRAL VALVE: Valve structure was normal. There was normal leaflet separation. DOPPLER: The transmitral velocity was within the normal range. There was no evidence for stenosis. There was no significant regurgitation.    AORTIC VALVE: The valve was trileaflet. Leaflets exhibited normal thickness and normal cuspal separation. DOPPLER: Transaortic velocity was within the normal range. There was no evidence for stenosis. There was no significant  regurgitation.    TRICUSPID VALVE: The valve structure was normal. There was normal leaflet separation. DOPPLER: The transtricuspid velocity was within the normal range. There was no evidence for stenosis. There was trace regurgitation. Estimated peak PA  pressure was 20 mmHg.    PULMONIC VALVE: Leaflets exhibited normal thickness, no calcification, and normal cuspal separation. DOPPLER: The transpulmonic velocity was within the normal range. There was no significant regurgitation.    PERICARDIUM: There was no pericardial effusion. The pericardium was normal in appearance.    AORTA: The root exhibited normal size.    SYSTEMIC VEINS: IVC: The inferior vena cava was normal in size.    SYSTEM MEASUREMENT TABLES    2D  %FS: 25.39 %  Ao Diam: 3.69 cm  Ao asc: 3.67 cm  EDV(Teich): 59.86 ml  EF(Teich): 50.86 %  ESV(Teich): 29.42 ml  IVSd: 1.22 cm  LA Area: 20.72 cm2  LA Diam: 2.98 cm  LVEDV MOD A4C: 70.49 ml  LVEF MOD A4C: 67.93 %  LVESV MOD A4C: 22.6 ml  LVIDd: 3.75 cm  LVIDs: 2.79 cm  LVLd A4C: 7.91 cm  LVLs A4C: 6.44 cm  LVPWd: 1.21 cm  RA Area: 14.58 cm2  RVIDd: 3.37 cm  SV MOD A4C: 47.89 ml  SV(Teich): 30.44 ml    CW  TR MaxP.31 mmHg  TR Vmax: 2.08 m/s    MM  TAPSE: 2.46 cm    PW  E' Sept: 0.08 m/s  E/E' Sept: 8.48  MV A Dileep: 0.68 m/s  MV Dec [place]: 3.36 m/s2  MV DecT: 198.11 ms  MV E Dileep: 0.67 m/s  MV E/A Ratio: 0.98  MV PHT: 57.45 ms  MVA By PHT: 3.83 cm2    Intersocietal  Commission Accredited Echocardiography Laboratory    Prepared and electronically signed by    Jonathon Sanchez MD  Signed 20-Aug-2021 16:23:14      Stress Test:   No results found for this or any previous visit.      Cardiac Catheterization:  No results found for this or any previous visit.      HOLTER MONITOR: 24 HOUR/48 HOUR MONITORS    24 Hour  11/13/2018    IMPRESSION:  Predominantly normal sinus rhythm throughout the monitoring interval at a controlled rate. Patient does have a background electronic pacemaker which was functioning appropriately.        2.   Low density of nonsustained ventricular ectopy.        3.   Low density of nonsustained premature supraventricular ectopy.        4.   No significant bradycardia.        5.   No symptoms reported during the monitoring interval.        AMB Extended Holter Monitor: Zio XT/AT or BioTel  No results found for this or any previous visit.        DEVICE CHECK:     Results for orders placed or performed in visit on 07/28/24   Cardiac EP device report    Narrative    BSC DC PM (DDD MODE)/NOT MRI CONDITIONAL  Jul 27, 2024 22:29 - Yellow Alert - Explant indicator reached on Jul 27, 2024 22:29. Schedule replacement of this device.  LATITUDE TRANSMISSION: BATTERY STATUS EXPLANT INDICATOR REACHED ON 7/27/2024. AP 41%  93%. ALL AVAILABLE LEAD PARAMETERS WITHIN NORMAL LIMITS. NO SIGNIFICANT HIGH RATE EPISODES. PT TAKES METOPROLOL TART. EF 60% (ECHO 8/20/2021).  NORMAL DEVICE FUNCTION.  PT OF DR PARMAR. PT & SON WILL BE CONTACTED TO SCHED H&P. PAS/ALDO              Code Status: [unfilled]  Advance Directive and Living Will:      Power of :    POLST:      Counseling / Coordination of Care  Please set up for generator change      Ashu Rutherford MD

## 2024-08-23 ENCOUNTER — TELEPHONE (OUTPATIENT)
Dept: CARDIOLOGY CLINIC | Facility: CLINIC | Age: 89
End: 2024-08-23

## 2024-08-23 ENCOUNTER — OFFICE VISIT (OUTPATIENT)
Dept: CARDIOLOGY CLINIC | Facility: CLINIC | Age: 89
End: 2024-08-23
Payer: COMMERCIAL

## 2024-08-23 ENCOUNTER — PREP FOR PROCEDURE (OUTPATIENT)
Dept: CARDIOLOGY CLINIC | Facility: CLINIC | Age: 89
End: 2024-08-23

## 2024-08-23 VITALS
BODY MASS INDEX: 27.44 KG/M2 | HEART RATE: 53 BPM | DIASTOLIC BLOOD PRESSURE: 66 MMHG | SYSTOLIC BLOOD PRESSURE: 128 MMHG | HEIGHT: 73 IN

## 2024-08-23 DIAGNOSIS — I47.10 SVT (SUPRAVENTRICULAR TACHYCARDIA): ICD-10-CM

## 2024-08-23 DIAGNOSIS — I35.9 AORTIC VALVE DISEASE: ICD-10-CM

## 2024-08-23 DIAGNOSIS — I71.21 ANEURYSM OF ASCENDING AORTA WITHOUT RUPTURE (HCC): ICD-10-CM

## 2024-08-23 DIAGNOSIS — Z95.0 CARDIAC PACEMAKER IN SITU: Primary | ICD-10-CM

## 2024-08-23 DIAGNOSIS — Z45.010 PACEMAKER AT END OF BATTERY LIFE: Primary | ICD-10-CM

## 2024-08-23 DIAGNOSIS — I10 HYPERTENSION, UNSPECIFIED TYPE: ICD-10-CM

## 2024-08-23 PROCEDURE — 99213 OFFICE O/P EST LOW 20 MIN: CPT | Performed by: INTERNAL MEDICINE

## 2024-08-23 PROCEDURE — 93000 ELECTROCARDIOGRAM COMPLETE: CPT | Performed by: INTERNAL MEDICINE

## 2024-08-23 NOTE — TELEPHONE ENCOUNTER
"Patient scheduled for Dual Chambers Pacemaker Gen Change device on 9/3/24 at [facility] with Dr. Rutherford.      Instructions sent to patient through [employer].      Patient aware of all general instructions.    Medication holds:   Hydrochlorothiazide - Do not take morning of procedure.    Blood Thinners:  N/A    Labs to be done on 8/26/24:  CMP / CBC (FASTING 8 HOURS) (ordered by Leila AKERS)      Thank you,  Barbara \"Silvia\" Austin    "

## 2024-08-23 NOTE — PATIENT INSTRUCTIONS
- Our surgery schedulers will assist with scheduling your generator change     If you have any questions call our office at [phone], option # 2    - Continue follow up care with general cardiology, Dr. Eller   
3 = A little assistance

## 2024-08-23 NOTE — LETTER
August 23, 2024     Anirudh Flowers MD  [address] [place] [id number]    Patient: Franc Bañuelos   YOB: 1935   Date of Visit: 8/23/2024       Dear Dr. Flowers:    Thank you for referring Franc Bañuelos to me for evaluation. Below are my notes for this consultation.    If you have questions, please do not hesitate to call me. I look forward to following your patient along with you.         Sincerely,        Ashu Rutherford MD        CC: Franc Bañuelos  CARDIOLOGY SURGERY COORDINATOR    Ashu Rutherford MD  8/23/2024  3:34 PM  Sign when Signing Visit   Consultation - Electrophysiology-Cardiology (EP)   Franc Bañuelos 89 y.o. male MRN: [id number]  Unit/Bed#:  Encounter: [id number]      1. Pacemaker at end of battery life  POCT ECG      2. Aortic valve disease        3. Hypertension, unspecified type        4. SVT (supraventricular tachycardia)        5. Aneurysm of ascending aorta without rupture (HCC)              Consults  Physician Requesting Consult: Self, Referral , PCP  Reason for Consult / Principal Problem: H&P for pacemaker JAMI         Summary of my recommendation for the patient  Proceed with generator change  [employer] dual-chamber device        Clinical conditions  Permanent pacemaker, at JAMI  SVT  Hypertension        Assessment & Plan    Permanent pacemaker, at [facility]  Device at elective replacement, July 27, 2024  Patient is V paced 93% of the time  Last echocardiogram, August 2021, EF 60%  Proceed with generator change      SVT  Patient is on metoprolol      Hypertension  Patient currently on metoprolol, hydrochlorothiazide, amlodipine          History of Present Illness  HPI: Franc Bañuelos is a 89 y.o. year old male has been referred to me by Dr Eller for the evaluation and management of pacemaker generator at [facility]    The patient has significant medical illnesses which include  Permanent pacemaker, at [facility]  SVT  Hypertension    Patient is on a wheelchair  He is not  complaining of anginal-like chest pain, orthopnea, PND  He does have some chronic leg swelling  He is not complaining of palpitations, presyncope or syncope  He has limited exertional ability       Historical Information  Past Medical History:   Diagnosis Date   • Aortic aneurysm (HCC) 2018    per pt-had echo   • Balanitis 01/02/2020   • Basal cell carcinoma    • Bradycardia    • Degenerative joint disease (DJD) of lumbar spine    • Depression    • Dizziness    • Hypertension    • Kidney stones    • Pacemaker    • Pneumonia 2018   • Spinal stenosis     per pt     Past Surgical History:   Procedure Laterality Date   • CARDIAC PACEMAKER PLACEMENT     • CHOLECYSTECTOMY     • EYE SURGERY     • FL RETROGRADE PYELOGRAM  2/1/2023   • FL RETROGRADE PYELOGRAM  4/11/2023   • CT CYSTO BLADDER W/URETERAL CATHETERIZATION Right 2/1/2023    Procedure: CYSTOSCOPY RETROGRADE PYELOGRAM WITH INSERTION STENT URETERAL;  Surgeon: Arturo Rubalcava MD;  Location: [facility];  Service: Urology   • CT CYSTO/URETERO W/LITHOTRIPSY &INDWELL STENT INSRT Right 4/11/2023    Procedure: CYSTOSCOPY URETEROSCOPY WITH LITHOTRIPSY HOLMIUM LASER, RETROGRADE PYELOGRAM AND INSERTION STENT URETERAL, STONE BASKET EXTRACTION;  Surgeon: Arturo Rubalcava MD;  [facility];  Service: Urology   • CT HEMIARTHROPLASTY HIP PARTIAL Right 10/10/2021    Procedure: HEMIARTHROPLASTY HIP (BIPOLAR), RIGHT;  Surgeon: Gladys Sinclair MD;  Location: [facility];  Service: Orthopedics   • TONSILLECTOMY       Social History     Substance and Sexual Activity   Alcohol Use Not Currently     Social History     Substance and Sexual Activity   Drug Use No     Social History     Tobacco Use   Smoking Status Former   • Types: Cigarettes, Pipe, Cigars   Smokeless Tobacco Never   Tobacco Comments    Infrequently for brief period     Social History     Socioeconomic History   • Marital status:      Spouse name: Not on file   • Number of children: Not on file   • Years of  "education: Not on file   • Highest education level: Not on file   Occupational History   • Occupation: Retired   Tobacco Use   • Smoking status: Former     Types: Cigarettes, Pipe, Cigars   • Smokeless tobacco: Never   • Tobacco comments:     Infrequently for brief period   Vaping Use   • Vaping status: Never Used   Substance and Sexual Activity   • Alcohol use: Not Currently   • Drug use: No   • Sexual activity: Not Currently   Other Topics Concern   • Not on file   Social History Narrative   • Not on file     Social Determinants of Health     Financial Resource Strain: Low Risk  (8/3/2023)    Overall Financial Resource Strain (CARDIA)    • Difficulty of Paying Living Expenses: Not very hard   Food Insecurity: No Food Insecurity (2/2/2023)    Hunger Vital Sign    • Worried About Running Out of Food in the Last Year: Never true    • Ran Out of Food in the Last Year: Never true   Transportation Needs: No Transportation Needs (8/3/2023)    PRAPARE - Transportation    • Lack of Transportation (Medical): No    • Lack of Transportation (Non-Medical): No   Physical Activity: Not on file   Stress: Not on file   Social Connections: Not on file   Intimate Partner Violence: Not on file   Housing Stability: Low Risk  (2/2/2023)    Housing Stability Vital Sign    • Unable to Pay for Housing in the Last Year: No    • Number of Places Lived in the Last Year: 1    • Unstable Housing in the Last Year: No     .  Family History:  Family History   Problem Relation Age of Onset   • Diabetes Mother    • Heart disease Mother          Meds/Allergies     No current facility-administered medications for this visit.        Not in a hospital admission.    No Known Allergies        Objective  Vitals: Visit Vitals  Ht 6' 1\" (1.854 m)   BMI 27.44 kg/m²   Smoking Status Former   BSA 2.19 m²      There were no vitals filed for this visit.[unfilled]    Invasive Devices       Drain  Duration             Ureteral Internal Stent 6 Fr. 568 days    " Ureteral Internal Stent Right ureter 6 Fr. 500 days                      ROS  Review of Systems   All other systems reviewed and are negative.  As described in my history of present illness        PHYSICAL EXAM  Physical Exam  Vitals reviewed.   Constitutional:       General: He is not in acute distress.     Appearance: Normal appearance. He is normal weight. He is not ill-appearing.      Comments: Patient is in a wheelchair   HENT:      Head: Normocephalic and atraumatic.      Right Ear: External ear normal.      Left Ear: External ear normal.      Nose: Nose normal.      Mouth/Throat:      Pharynx: Oropharynx is clear.   Eyes:      General: No scleral icterus.     Extraocular Movements: Extraocular movements intact.      Conjunctiva/sclera: Conjunctivae normal.      Pupils: Pupils are equal, round, and reactive to light.   Cardiovascular:      Rate and Rhythm: Normal rate and regular rhythm.      Heart sounds: Normal heart sounds. No murmur heard.     No gallop.   Pulmonary:      Effort: No respiratory distress.      Breath sounds: Normal breath sounds. No wheezing.   Abdominal:      General: Bowel sounds are normal. There is no distension.      Palpations: Abdomen is soft.      Tenderness: There is no abdominal tenderness. There is no guarding.   Musculoskeletal:         General: No swelling or deformity.      Cervical back: Neck supple. No rigidity.   Skin:     Coloration: Skin is not jaundiced.      Findings: No bruising or lesion.   Neurological:      Mental Status: He is alert and oriented to person, place, and time. Mental status is at baseline.      Motor: No weakness.   Psychiatric:         Mood and Affect: Mood normal.         Behavior: Behavior normal.         Thought Content: Thought content normal.         Judgment: Judgment normal.               LAB RESULTS:    CBC:  WBC   Date Value Ref Range Status   03/31/2023 8.77 4.31 - 10.16 Thousand/uL Final   04/08/2015 5.87 4.31 - 10.16 Thousand/uL Final      Hemoglobin   Date Value Ref Range Status   03/31/2023 14.3 12.0 - 17.0 g/dL Final   04/08/2015 12.0 12.0 - 17.0 g/dL Final     Hematocrit   Date Value Ref Range Status   03/31/2023 42.7 36.5 - 49.3 % Final   04/08/2015 35.0 (L) 36.5 - 49.3 % Final     MCV   Date Value Ref Range Status   03/31/2023 101 (H) 82 - 98 fL Final   04/08/2015 96 82 - 98 fL Final     Platelets   Date Value Ref Range Status   03/31/2023 265 149 - 390 Thousands/uL Final   04/08/2015 145 (L) 149 - 390 Thousand/uL Final     RBC   Date Value Ref Range Status   03/31/2023 4.24 3.88 - 5.62 Million/uL Final   04/08/2015 3.63 (L) 3.88 - 5.62 Million/uL Final     MCH   Date Value Ref Range Status   03/31/2023 33.7 26.8 - 34.3 pg Final   04/08/2015 33.1 26.8 - 34.3 pg Final     MCHC   Date Value Ref Range Status   03/31/2023 33.5 31.4 - 37.4 g/dL Final   04/08/2015 34.3 31.4 - 37.4 g/dL Final     RDW   Date Value Ref Range Status   03/31/2023 13.8 11.6 - 15.1 % Final   04/08/2015 13.7 11.6 - 15.1 % Final     MPV   Date Value Ref Range Status   03/31/2023 12.5 8.9 - 12.7 fL Final   04/08/2015 10.6 8.9 - 12.7 fL Final     nRBC   Date Value Ref Range Status   03/31/2023 0 /100 WBCs Final       CMP:  Sodium   Date Value Ref Range Status   04/08/2015 142 136 - 145 mmol/L Final     Potassium   Date Value Ref Range Status   03/31/2023 4.0 3.5 - 5.3 mmol/L Final   04/08/2015 3.1 (L) 3.5 - 5.3 mmol/L Final     Chloride   Date Value Ref Range Status   03/31/2023 105 96 - 108 mmol/L Final   04/08/2015 109 (H) 100 - 108 mmol/L Final     CO2   Date Value Ref Range Status   03/31/2023 27 21 - 32 mmol/L Final   04/08/2015 24 23 - 33 mmol/L Final     Anion Gap   Date Value Ref Range Status   04/08/2015 9 4 - 13 mmol/L Final     BUN   Date Value Ref Range Status   03/31/2023 18 5 - 25 mg/dL Final   04/08/2015 23 5 - 25 mg/dL Final     Creatinine   Date Value Ref Range Status   03/31/2023 0.66 0.60 - 1.30 mg/dL Final     Comment:     Standardized to IDMS reference  "method   04/08/2015 0.64 0.60 - 1.30 mg/dL Final     Comment:     Standardized to IDMS reference method     Glucose   Date Value Ref Range Status   04/08/2015 114 65 - 140 mg/dL Final     Comment:     If patient is fasting, the ADA then defines impaired fasting glucose as  >100 mg/dl and diabetes as  >or equal to 126 mg/dl.       Calcium   Date Value Ref Range Status   03/31/2023 9.6 8.3 - 10.1 mg/dL Final   04/08/2015 7.8 (L) 8.3 - 10.1 mg/dL Final     AST   Date Value Ref Range Status   01/31/2023 25 13 - 39 U/L Final     Comment:     Specimen collection should occur prior to Sulfasalazine administration due to the potential for falsely depressed results.    04/07/2015 51 (H) 0 - 45 U/L Final     ALT   Date Value Ref Range Status   01/31/2023 23 7 - 52 U/L Final     Comment:     Specimen collection should occur prior to Sulfasalazine administration due to the potential for falsely depressed results.    04/07/2015 44 16 - 63 U/L Final     Alkaline Phosphatase   Date Value Ref Range Status   01/31/2023 55 34 - 104 U/L Final   04/07/2015 56 46 - 116 U/L Final     Total Protein   Date Value Ref Range Status   04/07/2015 6.7 6.4 - 8.2 g/dL Final     Total Bilirubin   Date Value Ref Range Status   04/07/2015 0.78 0.20 - 1.00 mg/dL Final     eGFR   Date Value Ref Range Status   03/31/2023 86 ml/min/1.73sq m Final        Magnesium:   Magnesium   Date Value Ref Range Status   02/01/2023 1.8 (L) 1.9 - 2.7 mg/dL Final        A1C:  No results found for: \"HGBA1C\"     TSH:  TSH 3RD GENERATON   Date Value Ref Range Status   04/05/2021 3.890 (H) 0.358 - 3.740 uIU/mL Final        PT/INR:  Protime   Date Value Ref Range Status   10/10/2021 13.7 11.6 - 14.5 seconds Final   03/24/2014 14.0 11.8 - 14.1 Seconds Final     INR   Date Value Ref Range Status   10/10/2021 1.05 0.84 - 1.19 Final   03/24/2014 1.13 0.86 - 1.16 Final     Comment:     The above 2 analytes were performed by [facility]  [address] [place] [address],[place] [address]   "       Lipid Panel:  Cholesterol   Date Value Ref Range Status   04/05/2021 135 50 - 200 mg/dL Final     Comment:     Cholesterol:       Desirable         <200 mg/dl       Borderline         200-239 mg/dl       High              >239            Triglycerides   Date Value Ref Range Status   04/05/2021 48 <=150 mg/dL Final     Comment:     Triglyceride:     Normal          <150 mg/dl     Borderline High 150-199 mg/dl     High            200-499 mg/dl        Very High       >499 mg/dl    Specimen collection should occur prior to N-Acetylcysteine or Metamizole administration due to the potential for falsely depressed results.   03/24/2014 48 mg/dL Final     Comment:     TRIGLYCERIDE:       Normal                 <150 mg/dl       Borderline High       150-199 mg/dl       High                  200-499 mg/dl       Very High             >499 mg/dl  _______________________________________       HDL   Date Value Ref Range Status   03/24/2014 47 mg/dL Final     Comment:     HDL:       High       >59 mg/dl       Low        <41 mg/dl  ______________________________       HDL, Direct   Date Value Ref Range Status   04/05/2021 64 >=40 mg/dL Final     Comment:     HDL Cholesterol:       Low     <41 mg/dL  Specimen collection should occur prior to Metamizole administration due to the potential for falsley depressed results.     Non-HDL-Chol (CHOL-HDL)   Date Value Ref Range Status   04/05/2021 71 mg/dl Final       Troponin:  Troponin I   Date Value Ref Range Status   10/10/2021 <0.02 <=0.04 ng/mL Final     Comment:     Autovalidation override  [employer] Chemistry analyzer 99% cutoff is > 0.04 ng/mL in network labs     o cTnI 99% cutoff is useful only when applied to patients in the clinical setting of myocardial ischemia   o cTnI 99% cutoff should be interpreted in the context of clinical history, ECG findings and possibly cardiac imaging to establish correct diagnosis.   o cTnI 99% cutoff may be suggestive but clearly not indicative  of a coronary event without the clinical setting of myocardial ischemia.           Imaging:    [id number]        DERIAN:  No results found for this or any previous visit.      Echocardiogram:  Results for orders placed during the hospital encounter of 21    Echo complete with contrast if indicated    Narrative  [facility]  [address] [place] [address]  [phone]    Transthoracic Echocardiogram  2D, M-mode, Doppler, and Color Doppler    Study date:  20-Aug-2021    Patient: JOSE DAVID KINGSTON  MR number: [id number]  Account number: [id number]  : 1935  Age: 86 years  Gender: Male  Status: Outpatient  Location: [facility]  Height: 73 in  Weight: 196.7 lb  BP: 124/ 78 mmHg    Indications: Assess left ventricular function. Assess left ventricular hypertrophy.    Sonographer:  DEBORA Mix  Primary Physician:  Anirudh Flowers MD  Referring Physician:  Taqueria Eller MD  Group:  [facility]  Interpreting Physician:  Jonathon Sanchez MD    SUMMARY    LEFT VENTRICLE:  Systolic function was normal. Ejection fraction was estimated to be 60 %.  There was hypokinesis of the basal inferior wall(s).  Wall thickness was mildly increased.  Features were consistent with a pseudonormal left ventricular filling pattern, with concomitant abnormal relaxation and increased filling pressure (grade 2 diastolic dysfunction).    RIGHT VENTRICLE:  The size was normal.  Systolic function was normal.    TRICUSPID VALVE:  There was trace regurgitation.    HISTORY: PRIOR HISTORY: Hypertension, LVH, SVT, s/p PPM, AV disease non-specific    PROCEDURE: The study was performed in the [facility]. This was a routine study. The transthoracic approach was used. The study included complete 2D imaging, M-mode, complete spectral Doppler, and color Doppler. The  heart rate was 77 bpm, at the start of the study. Images were obtained from the  parasternal, apical, subcostal, and suprasternal notch acoustic windows. Image quality was adequate.    LEFT VENTRICLE: Size was normal. Systolic function was normal. Ejection fraction was estimated to be 60 %. There was hypokinesis of the basal inferior wall(s). Cardiac wall motion was otherwise normal. Wall thickness was mildly increased.  DOPPLER: Features were consistent with a pseudonormal left ventricular filling pattern, with concomitant abnormal relaxation and increased filling pressure (grade 2 diastolic dysfunction).    RIGHT VENTRICLE: The size was normal. Systolic function was normal. Wall thickness was normal.    LEFT ATRIUM: Size was normal.    RIGHT ATRIUM: Size was normal.    MITRAL VALVE: Valve structure was normal. There was normal leaflet separation. DOPPLER: The transmitral velocity was within the normal range. There was no evidence for stenosis. There was no significant regurgitation.    AORTIC VALVE: The valve was trileaflet. Leaflets exhibited normal thickness and normal cuspal separation. DOPPLER: Transaortic velocity was within the normal range. There was no evidence for stenosis. There was no significant  regurgitation.    TRICUSPID VALVE: The valve structure was normal. There was normal leaflet separation. DOPPLER: The transtricuspid velocity was within the normal range. There was no evidence for stenosis. There was trace regurgitation. Estimated peak PA  pressure was 20 mmHg.    PULMONIC VALVE: Leaflets exhibited normal thickness, no calcification, and normal cuspal separation. DOPPLER: The transpulmonic velocity was within the normal range. There was no significant regurgitation.    PERICARDIUM: There was no pericardial effusion. The pericardium was normal in appearance.    AORTA: The root exhibited normal size.    SYSTEMIC VEINS: IVC: The inferior vena cava was normal in size.    SYSTEM MEASUREMENT TABLES    2D  %FS: 25.39 %  Ao Diam: 3.69 cm  Ao asc: 3.67 cm  EDV(Teich): 59.86  ml  EF(Teich): 50.86 %  ESV(Teich): 29.42 ml  IVSd: 1.22 cm  LA Area: 20.72 cm2  LA Diam: 2.98 cm  LVEDV MOD A4C: 70.49 ml  LVEF MOD A4C: 67.93 %  LVESV MOD A4C: 22.6 ml  LVIDd: 3.75 cm  LVIDs: 2.79 cm  LVLd A4C: 7.91 cm  LVLs A4C: 6.44 cm  LVPWd: 1.21 cm  RA Area: 14.58 cm2  RVIDd: 3.37 cm  SV MOD A4C: 47.89 ml  SV(Teich): 30.44 ml    CW  TR MaxP.31 mmHg  TR Vmax: 2.08 m/s    MM  TAPSE: 2.46 cm    PW  E' Sept: 0.08 m/s  E/E' Sept: 8.48  MV A Dileep: 0.68 m/s  MV Dec Will: 3.36 m/s2  MV DecT: 198.11 ms  MV E Dileep: 0.67 m/s  MV E/A Ratio: 0.98  MV PHT: 57.45 ms  MVA By PHT: 3.83 cm2    [facility] Accredited Echocardiography Laboratory    Prepared and electronically signed by    Jonathon Sanchez MD  Signed 20-Aug-2021 16:23:14      Stress Test:   No results found for this or any previous visit.      Cardiac Catheterization:  No results found for this or any previous visit.      HOLTER MONITOR: 24 HOUR/48 HOUR MONITORS    24 Hour  2018    IMPRESSION:  Predominantly normal sinus rhythm throughout the monitoring interval at a controlled rate. Patient does have a background electronic pacemaker which was functioning appropriately.        2.   Low density of nonsustained ventricular ectopy.        3.   Low density of nonsustained premature supraventricular ectopy.        4.   No significant bradycardia.        5.   No symptoms reported during the monitoring interval.        AMB Extended Holter Monitor: Zio XT/AT or BioTel  No results found for this or any previous visit.        DEVICE CHECK:     Results for orders placed or performed in visit on 24   Cardiac EP device report    Narrative    BSC DC PM (DDD MODE)/NOT MRI CONDITIONAL  2024 22:29 - Yellow Alert - Explant indicator reached on 2024 22:29. Schedule replacement of this device.  LATITUDE TRANSMISSION: BATTERY STATUS EXPLANT INDICATOR REACHED ON 2024. AP 41%  93%. ALL AVAILABLE LEAD PARAMETERS WITHIN NORMAL LIMITS. NO  SIGNIFICANT HIGH RATE EPISODES. PT TAKES METOPROLOL TART. EF 60% (ECHO 8/20/2021).  NORMAL DEVICE FUNCTION.  PT OF DR PARMAR. PT & SON WILL BE CONTACTED TO SCHED H&P. PAS/GV              Code Status: [unfilled]  Advance Directive and Living Will:      Power of :    POLST:      Counseling / Coordination of Care  Please set up for generator change      Ashu Rutherford MD

## 2024-08-23 NOTE — TELEPHONE ENCOUNTER
----- Message from Viry GARCIA sent at 8/23/2024  3:34 PM EDT -----  Regarding: Set up gen change  Per Dr. Rutherford --    Please schedule gen change.    Thanks

## 2024-08-26 ENCOUNTER — APPOINTMENT (OUTPATIENT)
Dept: LAB | Facility: CLINIC | Age: 89
End: 2024-08-26
Payer: COMMERCIAL

## 2024-08-26 DIAGNOSIS — Z95.0 CARDIAC PACEMAKER IN SITU: ICD-10-CM

## 2024-08-26 DIAGNOSIS — I47.10 SVT (SUPRAVENTRICULAR TACHYCARDIA): ICD-10-CM

## 2024-08-26 LAB
ALBUMIN SERPL BCG-MCNC: 4.1 G/DL (ref 3.5–5)
ALP SERPL-CCNC: 57 U/L (ref 34–104)
ALT SERPL W P-5'-P-CCNC: 22 U/L (ref 7–52)
ANION GAP SERPL CALCULATED.3IONS-SCNC: 9 MMOL/L (ref 4–13)
AST SERPL W P-5'-P-CCNC: 27 U/L (ref 13–39)
BASOPHILS # BLD AUTO: 0.08 THOUSANDS/ÂΜL (ref 0–0.1)
BASOPHILS NFR BLD AUTO: 1 % (ref 0–1)
BILIRUB SERPL-MCNC: 0.78 MG/DL (ref 0.2–1)
BUN SERPL-MCNC: 22 MG/DL (ref 5–25)
CALCIUM SERPL-MCNC: 9 MG/DL (ref 8.4–10.2)
CHLORIDE SERPL-SCNC: 104 MMOL/L (ref 96–108)
CO2 SERPL-SCNC: 26 MMOL/L (ref 21–32)
CREAT SERPL-MCNC: 0.77 MG/DL (ref 0.6–1.3)
EOSINOPHIL # BLD AUTO: 0.28 THOUSAND/ÂΜL (ref 0–0.61)
EOSINOPHIL NFR BLD AUTO: 4 % (ref 0–6)
ERYTHROCYTE [DISTWIDTH] IN BLOOD BY AUTOMATED COUNT: 13.3 % (ref 11.6–15.1)
GFR SERPL CREATININE-BSD FRML MDRD: 80 ML/MIN/1.73SQ M
GLUCOSE P FAST SERPL-MCNC: 118 MG/DL (ref 65–99)
HCT VFR BLD AUTO: 40.6 % (ref 36.5–49.3)
HGB BLD-MCNC: 13.7 G/DL (ref 12–17)
IMM GRANULOCYTES # BLD AUTO: 0.04 THOUSAND/UL (ref 0–0.2)
IMM GRANULOCYTES NFR BLD AUTO: 1 % (ref 0–2)
LYMPHOCYTES # BLD AUTO: 1.12 THOUSANDS/ÂΜL (ref 0.6–4.47)
LYMPHOCYTES NFR BLD AUTO: 15 % (ref 14–44)
MCH RBC QN AUTO: 34.1 PG (ref 26.8–34.3)
MCHC RBC AUTO-ENTMCNC: 33.7 G/DL (ref 31.4–37.4)
MCV RBC AUTO: 101 FL (ref 82–98)
MONOCYTES # BLD AUTO: 0.65 THOUSAND/ÂΜL (ref 0.17–1.22)
MONOCYTES NFR BLD AUTO: 9 % (ref 4–12)
NEUTROPHILS # BLD AUTO: 5.25 THOUSANDS/ÂΜL (ref 1.85–7.62)
NEUTS SEG NFR BLD AUTO: 70 % (ref 43–75)
NRBC BLD AUTO-RTO: 0 /100 WBCS
PLATELET # BLD AUTO: 223 THOUSANDS/UL (ref 149–390)
PMV BLD AUTO: 12.1 FL (ref 8.9–12.7)
POTASSIUM SERPL-SCNC: 4.1 MMOL/L (ref 3.5–5.3)
PROT SERPL-MCNC: 7 G/DL (ref 6.4–8.4)
RBC # BLD AUTO: 4.02 MILLION/UL (ref 3.88–5.62)
SODIUM SERPL-SCNC: 139 MMOL/L (ref 135–147)
WBC # BLD AUTO: 7.42 THOUSAND/UL (ref 4.31–10.16)

## 2024-08-26 PROCEDURE — 85025 COMPLETE CBC W/AUTO DIFF WBC: CPT

## 2024-08-26 PROCEDURE — 36415 COLL VENOUS BLD VENIPUNCTURE: CPT

## 2024-08-26 PROCEDURE — 80053 COMPREHEN METABOLIC PANEL: CPT

## 2024-09-02 ENCOUNTER — ANESTHESIA EVENT (OUTPATIENT)
Dept: NON INVASIVE DIAGNOSTICS | Facility: HOSPITAL | Age: 89
End: 2024-09-02
Payer: COMMERCIAL

## 2024-09-03 ENCOUNTER — HOSPITAL ENCOUNTER (OUTPATIENT)
Facility: HOSPITAL | Age: 89
Setting detail: OUTPATIENT SURGERY
Discharge: HOME | End: 2024-09-03
Attending: INTERNAL MEDICINE | Admitting: INTERNAL MEDICINE
Payer: COMMERCIAL

## 2024-09-03 ENCOUNTER — ANESTHESIA (OUTPATIENT)
Dept: NON INVASIVE DIAGNOSTICS | Facility: HOSPITAL | Age: 89
End: 2024-09-03
Payer: COMMERCIAL

## 2024-09-03 VITALS
DIASTOLIC BLOOD PRESSURE: 75 MMHG | RESPIRATION RATE: 16 BRPM | HEART RATE: 50 BPM | SYSTOLIC BLOOD PRESSURE: 133 MMHG | HEIGHT: 73 IN | WEIGHT: 195.6 LBS | TEMPERATURE: 96.8 F | BODY MASS INDEX: 25.92 KG/M2 | OXYGEN SATURATION: 98 %

## 2024-09-03 DIAGNOSIS — Z45.010 PACEMAKER AT END OF BATTERY LIFE: ICD-10-CM

## 2024-09-03 LAB
ANION GAP SERPL CALCULATED.3IONS-SCNC: 8 MMOL/L (ref 4–13)
ATRIAL RATE: 52 BPM
ATRIAL RATE: 72 BPM
BUN SERPL-MCNC: 16 MG/DL (ref 5–25)
CALCIUM SERPL-MCNC: 9.3 MG/DL (ref 8.4–10.2)
CHLORIDE SERPL-SCNC: 106 MMOL/L (ref 96–108)
CO2 SERPL-SCNC: 26 MMOL/L (ref 21–32)
CREAT SERPL-MCNC: 0.74 MG/DL (ref 0.6–1.3)
ERYTHROCYTE [DISTWIDTH] IN BLOOD BY AUTOMATED COUNT: 13.2 % (ref 11.6–15.1)
GFR SERPL CREATININE-BSD FRML MDRD: 81 ML/MIN/1.73SQ M
GLUCOSE P FAST SERPL-MCNC: 105 MG/DL (ref 65–99)
GLUCOSE SERPL-MCNC: 105 MG/DL (ref 65–140)
HCT VFR BLD AUTO: 38.4 % (ref 36.5–49.3)
HGB BLD-MCNC: 13 G/DL (ref 12–17)
INR PPP: 1.02 (ref 0.85–1.19)
MCH RBC QN AUTO: 33.8 PG (ref 26.8–34.3)
MCHC RBC AUTO-ENTMCNC: 33.9 G/DL (ref 31.4–37.4)
MCV RBC AUTO: 100 FL (ref 82–98)
P AXIS: 105 DEGREES
PLATELET # BLD AUTO: 201 THOUSANDS/UL (ref 149–390)
PMV BLD AUTO: 11.4 FL (ref 8.9–12.7)
POTASSIUM SERPL-SCNC: 3.8 MMOL/L (ref 3.5–5.3)
PR INTERVAL: 416 MS
PROTHROMBIN TIME: 13.7 SECONDS (ref 12.3–15)
QRS AXIS: -77 DEGREES
QRS AXIS: -79 DEGREES
QRSD INTERVAL: 160 MS
QRSD INTERVAL: 162 MS
QT INTERVAL: 468 MS
QT INTERVAL: 504 MS
QTC INTERVAL: 468 MS
QTC INTERVAL: 505 MS
RBC # BLD AUTO: 3.85 MILLION/UL (ref 3.88–5.62)
SODIUM SERPL-SCNC: 140 MMOL/L (ref 135–147)
T WAVE AXIS: 92 DEGREES
T WAVE AXIS: 95 DEGREES
VENTRICULAR RATE: 52 BPM
VENTRICULAR RATE: 70 BPM
WBC # BLD AUTO: 6.33 THOUSAND/UL (ref 4.31–10.16)

## 2024-09-03 PROCEDURE — 33228 REMV&REPLC PM GEN DUAL LEAD: CPT | Performed by: INTERNAL MEDICINE

## 2024-09-03 PROCEDURE — C1785 PMKR, DUAL, RATE-RESP: HCPCS | Performed by: INTERNAL MEDICINE

## 2024-09-03 PROCEDURE — 93005 ELECTROCARDIOGRAM TRACING: CPT

## 2024-09-03 PROCEDURE — 85610 PROTHROMBIN TIME: CPT | Performed by: PHYSICIAN ASSISTANT

## 2024-09-03 PROCEDURE — 85027 COMPLETE CBC AUTOMATED: CPT | Performed by: PHYSICIAN ASSISTANT

## 2024-09-03 PROCEDURE — 80048 BASIC METABOLIC PNL TOTAL CA: CPT | Performed by: PHYSICIAN ASSISTANT

## 2024-09-03 PROCEDURE — 93010 ELECTROCARDIOGRAM REPORT: CPT | Performed by: INTERNAL MEDICINE

## 2024-09-03 DEVICE — PACEMAKER
Type: IMPLANTABLE DEVICE | Site: CHEST  WALL | Status: FUNCTIONAL
Brand: ACCOLADE™ MRI DR

## 2024-09-03 DEVICE — ENVELOPE CMRM6122 ABSORB MED MR
Type: IMPLANTABLE DEVICE | Site: CHEST  WALL | Status: FUNCTIONAL
Brand: TYRX™

## 2024-09-03 RX ORDER — SODIUM CHLORIDE 9 MG/ML
20 INJECTION, SOLUTION INTRAVENOUS CONTINUOUS
Status: DISCONTINUED | OUTPATIENT
Start: 2024-09-03 | End: 2024-09-03 | Stop reason: HOSPADM

## 2024-09-03 RX ORDER — SODIUM CHLORIDE 9 MG/ML
INJECTION, SOLUTION INTRAVENOUS CONTINUOUS PRN
Status: DISCONTINUED | OUTPATIENT
Start: 2024-09-03 | End: 2024-09-03

## 2024-09-03 RX ORDER — LIDOCAINE HYDROCHLORIDE 10 MG/ML
INJECTION, SOLUTION EPIDURAL; INFILTRATION; INTRACAUDAL; PERINEURAL CODE/TRAUMA/SEDATION MEDICATION
Status: DISCONTINUED | OUTPATIENT
Start: 2024-09-03 | End: 2024-09-03 | Stop reason: HOSPADM

## 2024-09-03 RX ORDER — CEFAZOLIN SODIUM 2 G/50ML
SOLUTION INTRAVENOUS AS NEEDED
Status: DISCONTINUED | OUTPATIENT
Start: 2024-09-03 | End: 2024-09-03

## 2024-09-03 RX ORDER — LIDOCAINE HYDROCHLORIDE 10 MG/ML
INJECTION, SOLUTION EPIDURAL; INFILTRATION; INTRACAUDAL; PERINEURAL AS NEEDED
Status: DISCONTINUED | OUTPATIENT
Start: 2024-09-03 | End: 2024-09-03

## 2024-09-03 RX ORDER — ACETAMINOPHEN 325 MG/1
650 TABLET ORAL EVERY 4 HOURS PRN
Status: DISCONTINUED | OUTPATIENT
Start: 2024-09-03 | End: 2024-09-03 | Stop reason: HOSPADM

## 2024-09-03 RX ORDER — GENTAMICIN 40 MG/ML
INJECTION, SOLUTION INTRAMUSCULAR; INTRAVENOUS CODE/TRAUMA/SEDATION MEDICATION
Status: DISCONTINUED | OUTPATIENT
Start: 2024-09-03 | End: 2024-09-03 | Stop reason: HOSPADM

## 2024-09-03 RX ORDER — PROPOFOL 10 MG/ML
INJECTION, EMULSION INTRAVENOUS CONTINUOUS PRN
Status: DISCONTINUED | OUTPATIENT
Start: 2024-09-03 | End: 2024-09-03

## 2024-09-03 RX ORDER — KETAMINE HCL IN NACL, ISO-OSM 100MG/10ML
SYRINGE (ML) INJECTION AS NEEDED
Status: DISCONTINUED | OUTPATIENT
Start: 2024-09-03 | End: 2024-09-03

## 2024-09-03 RX ADMIN — LIDOCAINE HYDROCHLORIDE 2 ML: 10 INJECTION, SOLUTION EPIDURAL; INFILTRATION; INTRACAUDAL; PERINEURAL at 08:31

## 2024-09-03 RX ADMIN — PHENYLEPHRINE HYDROCHLORIDE 25 MCG/MIN: 10 INJECTION INTRAVENOUS at 08:49

## 2024-09-03 RX ADMIN — SODIUM CHLORIDE 20 ML/HR: 0.9 INJECTION, SOLUTION INTRAVENOUS at 07:18

## 2024-09-03 RX ADMIN — SODIUM CHLORIDE: 0.9 INJECTION, SOLUTION INTRAVENOUS at 08:21

## 2024-09-03 RX ADMIN — Medication 10 MG: at 08:43

## 2024-09-03 RX ADMIN — CEFAZOLIN SODIUM 2000 MG: 2 SOLUTION INTRAVENOUS at 08:25

## 2024-09-03 RX ADMIN — PROPOFOL 60 MCG/KG/MIN: 10 INJECTION, EMULSION INTRAVENOUS at 08:31

## 2024-09-03 NOTE — Clinical Note
[facility] [id number] [facility] was inserted. The leads were placed into the connector and visually verified to be in correct position. Injury current obtained.

## 2024-09-03 NOTE — INTERVAL H&P NOTE
Please see recent office visit with Dr. Rutherford for full details.  Brief this patient is a pleasant 89-year-old male with positive scientific dual-chamber pacemaker in situ, preserved LVEF 60% per echo 8/2021, SVT maintained on metoprolol, and hypertension.  He has predominantly ventricularly paced, with prior report showing V pacing greater than 90%.  His device was initially implanted in 2014, and through routine device interrogations he was found to have reached JAMI as of 7/27/2024.  Pacemaker generator changes recommended, and he presents this admission to undergo that procedure.  No significant changes over the recent past, physical exam unchanged.    Vitals:    09/03/24 1014   BP: 133/75   Pulse: (!) 50   Resp:    Temp:    SpO2:

## 2024-09-03 NOTE — DISCHARGE INSTR - AVS FIRST PAGE
Please refer to post pacemaker implantation discharge instructions and restrictions and your pacemaker booklet/temporary card.     Keep incision dry for one week. Leave outer bandage in place for 1 week - it is water proof, and as long as it is fully adhered to your skin you may shower with it.  If it appears as though the bandage is coming off and/or there is any communication to the area of device incision, please then keep the whole area dry for the remaining week.  After 1 week, please remove by pulling all edges away from the center of the bandage. After the bandage is removed, you may then shower normally and get the area wet with soap and water, no scrubbing, and pat dry. Do not use lotions/powders/creams on incision.    Please call the office if you notice redness, swelling, bleeding, or drainage from incision or if you develop fevers.       AFTER PACEMAKER CARE:    If you have any questions, please call [phone] to speak with a nurse (8:30am-4pm, or [phone] after hours). For appointments, please call [phone].      WHAT YOU SHOULD KNOW:   A pacemaker is a small, battery-powered device that is placed under your skin in your upper chest area with wires placed through a vein that lead directly into the heart. It helps regulate your heart rate and prevent your heart from beating too slowly.                 AFTER YOU LEAVE:     Medicines:     Pain medicine:  You may need medicine to take away or decrease pain.     Learn how to take your medicine. Ask what medicine and how much you should take. Be sure you know how, when, and how often to take it. Usually Over the counter pain medicine is sufficient to control pain (Acetominophen or Ibuprofen) Ask your doctor if you may take these. If this does not control your pain, narcotic pain killers may be prescribed, please call if you need prescription.     Do not wait until the pain is severe before you take your medicine. Tell caregivers if your pain does  not decrease.    Pain medicine can make you dizzy or sleepy. Prevent falls by calling someone when you get out of bed or if you need help.        Take your medicine as directed.  Call your healthcare provider if you think your medicine is not helping or if you have side effects. Tell him if you are allergic to any medicine.    Follow up with your cardiologist after your procedure:  You will need a follow-up visit approximately 2 weeks after you leave the hospital. Your cardiologist will check your wound and make sure that your pacemaker is working correctly.     Follow the instructions to check your pacemaker:  Your cardiologist or primary healthcare provider will check your pacemaker and the battery regularly.  He will use a computer to check your pacemaker over the telephone or wireless device which will be given to you.     Pacemaker batteries usually last 8 to 10 years. The pacemaker unit will be replaced when the battery gets low. This is a simpler procedure than the original one to implant your pacemaker.    Wound care:  Keep your incision dry for one week.  Do not use lotions/powders/creams on incision. Leave outer bandage in place for 1 week - it is water proof, and as long as it is fully adhered to your skin you may shower with it.  If it appears as though the bandage is coming off and/or there is any communication to the area of device incision, please then keep the whole area dry for the remaining week.  After 1 week, please remove by pulling all edges away from the center of the bandage. Please call the office if you notice redness, swelling, bleeding, or drainage from incision or if you develop fevers.       Activity:   You may resume your normal activity following a generator change  Driving: you are able to drive 48 hours post PPM implant   Sports:  Ask your caregiver when it is okay to play tennis, golf, basketball, or any sport that requires you to lift your arms. Do not play full contact sports,  such as football, that could damage your pacemaker. Ask your cardiologist or primary healthcare provider how much and what kinds of physical activity are safe for you.    Living with a pacemaker:   Tell all caregivers you have a pacemaker:  This includes surgeons, radiologists, and medical technicians. You may want to wear a medical alert ID bracelet or necklace that states that you have a pacemaker.    Carry your pacemaker ID card:  Make sure you receive a pacemaker ID card. Carry it with you at all times. It lists important information about your pacemaker. Show it to airport security if you travel.     Avoid electrical interference:  Avoid welding equipment and other equipment with large magnets or electric fields. These things could interfere with how your pacemaker works. Use your cell phone on the ear opposite from your pacemaker. Do not carry your cell phone in your shirt pocket over your chest.     Some Pacemakers are MRI safe. Ask you doctor if it is safe to proceed with MRI and let the radiologist and staff know you have a pacemaker.     Do not touch the skin around your pacemaker:  This can cause damage to the lead wires or move the pacemaker unit from where it should be.    Contact your cardiologist or primary healthcare provider if:   The area around your pacemaker has increasing amount of pain after surgery. The pain should improve over first few days after implantation.     The skin around your stitches has increasing redness, swelling, or has drainage. This may mean that you have an infection.     You have a fever.     You have chills, a cough, and feel weak or achy. These are also signs of infection.    Your feet or ankles are more swollen than your baseline.     Your Heart rate is less than 50 beats per minute     Seek care immediately if:   Your bandage becomes soaked with blood.     Your pacemaker is swelling rapidly    Your stitches open up.     You feel your heart suddenly beating very slowly  or quickly.    You become too weak or dizzy to stand, or you pass out.     Your arm or leg feels warm, tender, and painful. It may look swollen and red.    You have chest pain that does not go away with rest or medicine.     You feel lightheaded, short of breath, and have chest pain.     You cough up blood.        © 2014 [employer]. Information is for End User's use only and may not be sold, redistributed or otherwise used for commercial purposes. All illustrations and images included in CareNotes® are the copyrighted property of [employer] or [employer].  The above information is an  only. It is not intended as medical advice for individual conditions or treatments. Talk to your doctor, nurse or pharmacist before following any medical regimen to see if it is safe and effective for you.

## 2024-09-03 NOTE — ANESTHESIA POSTPROCEDURE EVALUATION
Post-Op Assessment Note    CV Status:  Stable  Pain Score: 0    Pain management: adequate       Mental Status:  Alert and awake   Hydration Status:  Euvolemic   PONV Controlled:  Controlled   Airway Patency:  Patent     Post Op Vitals Reviewed: Yes    No anethesia notable event occurred.    Staff: Anesthesiologist, CRNA               BP   115/74   Temp      Pulse  70   Resp      SpO2   99 room air

## 2024-09-03 NOTE — ANESTHESIA PREPROCEDURE EVALUATION
Procedure:  Cardiac DC PM Gen Change (Chest)    Relevant Problems   CARDIO   (+) Aortic valve disease   (+) Ascending aortic aneurysm (HCC)   (+) Chest pain   (+) Hypertension   (+) Presence of cardiac pacemaker   (+) SVT (supraventricular tachycardia)      /RENAL   (+) BPH (benign prostatic hyperplasia)   (+) Hydronephrosis with ureteral calculus      NEURO/PSYCH   (+) Dysthymic disorder      Care Coordination   (+) Gait disturbance      Orthopedic/Musculoskeletal   (+) Cervical radiculopathy   (+) Closed fracture of neck of right femur with routine healing   (+) Spinal stenosis      Cardiovascular/Peripheral Vascular   (+) LVH (left ventricular hypertrophy)      Other   (+) Diuretic-induced hypokalemia             Anesthesia Plan  ASA Score- 3     Anesthesia Type- IV sedation with anesthesia with ASA Monitors.         Additional Monitors:     Airway Plan:            Plan Factors-Exercise tolerance (METS): <4 METS.    Chart reviewed. EKG reviewed. Imaging results reviewed. Existing labs reviewed. Patient summary reviewed.    Patient is not a current smoker.              Induction- intravenous.    Postoperative Plan-         Informed Consent- Anesthetic plan and risks discussed with patient.  I personally reviewed this patient with the CRNA. Discussed and agreed on the Anesthesia Plan with the CRNA..

## 2024-09-20 ENCOUNTER — IN-CLINIC DEVICE VISIT (OUTPATIENT)
Dept: CARDIOLOGY CLINIC | Facility: CLINIC | Age: 89
End: 2024-09-20

## 2024-09-20 DIAGNOSIS — Z95.0 PRESENCE OF CARDIAC PACEMAKER: ICD-10-CM

## 2024-09-20 DIAGNOSIS — I47.10 SVT (SUPRAVENTRICULAR TACHYCARDIA) (HCC): Primary | ICD-10-CM

## 2024-09-20 PROCEDURE — 99024 POSTOP FOLLOW-UP VISIT: CPT | Performed by: INTERNAL MEDICINE

## 2024-09-20 NOTE — PROGRESS NOTES
Results for orders placed or performed in visit on 09/20/24   Cardiac EP device report    Narrative    BSC DC PM (DDD MODE)/NOT MRI CONDITIONAL  Sep 20, 2024 15:07 - Check Atrial LeadSep 12, 2024 03:08 - RA automatic threshold has been in suspension for four days  DEVICE INTERROGATED IN THE [place] OFFICE. WOUND CHECK: INCISION CLEAN AND DRY WITH EDGES APPROXIMATED; WOUND CARE AND RESTRICTIONS REVIEWED WITH PATIENT.  AP 46%  100%.  ALL LEAD PARAMETERS WITHIN NORMAL LIMITS.  NO SIGNIFICANT HIGH RATE EPISODES.  PT TAKING METOPROLOL SUCC.  EF 60% (ECHO 8/20/2021).  NO PROGRAMMING CHANGES MADE TO DEVICE PARAMETERS. NORMAL DEVICE FUNCTION. PAS

## 2024-11-01 ENCOUNTER — TELEPHONE (OUTPATIENT)
Dept: FAMILY MEDICINE CLINIC | Facility: CLINIC | Age: 89
End: 2024-11-01

## 2024-11-01 NOTE — TELEPHONE ENCOUNTER
Voicemail message left for the patient in attempt to schedule their 2024 Medicare Annual Wellness Visit.  When patient returns the call, please schedule this appointment BEFORE 12/31/2024.

## 2024-12-19 ENCOUNTER — RA CDI HCC (OUTPATIENT)
Dept: OTHER | Facility: HOSPITAL | Age: 89
End: 2024-12-19

## 2024-12-27 ENCOUNTER — OFFICE VISIT (OUTPATIENT)
Dept: FAMILY MEDICINE CLINIC | Facility: CLINIC | Age: 89
End: 2024-12-27
Payer: COMMERCIAL

## 2024-12-27 VITALS
TEMPERATURE: 98.2 F | BODY MASS INDEX: 25.81 KG/M2 | HEART RATE: 44 BPM | RESPIRATION RATE: 16 BRPM | HEIGHT: 73 IN | OXYGEN SATURATION: 99 % | DIASTOLIC BLOOD PRESSURE: 78 MMHG | SYSTOLIC BLOOD PRESSURE: 124 MMHG

## 2024-12-27 DIAGNOSIS — I10 PRIMARY HYPERTENSION: Primary | ICD-10-CM

## 2024-12-27 DIAGNOSIS — C44.90 SKIN CANCER: ICD-10-CM

## 2024-12-27 DIAGNOSIS — M48.00 SPINAL STENOSIS, UNSPECIFIED SPINAL REGION: ICD-10-CM

## 2024-12-27 PROCEDURE — 99213 OFFICE O/P EST LOW 20 MIN: CPT | Performed by: FAMILY MEDICINE

## 2024-12-27 PROCEDURE — G0439 PPPS, SUBSEQ VISIT: HCPCS | Performed by: FAMILY MEDICINE

## 2024-12-27 NOTE — ASSESSMENT & PLAN NOTE
Hypertension.  The patient's blood pressure is stable at this time and he will continue current regimen of medications

## 2024-12-27 NOTE — ASSESSMENT & PLAN NOTE
Spinal stenosis.  Patient with chronic intermittent back pain.  He continues to use a walker with wheels for ambulating or a wheelchair.  He will continue to try and perform lower extremity exercises on a regular basis to maintain muscle strength

## 2024-12-27 NOTE — PROGRESS NOTES
Name: Franc Bañuelos      : 1935      MRN: [id number]  Encounter Provider: Anirudh Flowers MD  Encounter Date: 2024   Encounter department: [facility]    Assessment & Plan  Primary hypertension  Hypertension.  The patient's blood pressure is stable at this time and he will continue current regimen of medications         Spinal stenosis, unspecified spinal region  Spinal stenosis.  Patient with chronic intermittent back pain.  He continues to use a walker with wheels for ambulating or a wheelchair.  He will continue to try and perform lower extremity exercises on a regular basis to maintain muscle strength         Skin cancer  Skin cancer.  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  Patient was resistant to chemotherapy treatment I encouraged him to use his prescribed triamcinolone to decrease pruritus and redness of lesions.            Preventive health issues were discussed with patient, and age appropriate screening tests were ordered as noted in patient's After Visit Summary. Personalized health advice and appropriate referrals for health education or preventive services given if needed, as noted in patient's After Visit Summary.    History of Present Illness     Patient in the office to review chronic medical conditions.  He is accompanied by his daughter-in-law.  He states he sees his cardiologist once or twice yearly.  He no longer wishes to see dermatologist for skin cancer on his scalp.  He refused 5-fluorouracil.  He has also been reluctant to use topical steroid such as triamcinolone prescribed by dermatology.  Redness of lesions on his scalp do cause irritation and itching.    Patient denies any recent illness and has received his annual influenza and COVID-19 vaccinations.  Patient continues to walk with a walker with wheels in his house and performing mild lower extremity exercises to maintain his strength.  He does try to  urinate on a schedule of every 2 and half hours so as to not have urinary incontinence.       Patient Care Team:  Anirudh Flowers MD as PCP - General  MD Chon Barragan MD    Review of Systems   Constitutional: Negative.    HENT: Negative.     Respiratory: Negative.     Cardiovascular: Negative.    Gastrointestinal: Negative.    Genitourinary:  Positive for frequency.   Musculoskeletal:  Positive for arthralgias and back pain.   Skin:         As per HPI   Neurological: Negative.    Psychiatric/Behavioral: Negative.       Medical History Reviewed by provider this encounter:  [facility]       Annual Wellness Visit Questionnaire   Franc is here for his Subsequent Wellness visit. Last Medicare Wellness visit information reviewed, patient interviewed and updates made to the record today.      Health Risk Assessment:   Patient rates overall health as good. Patient feels that their physical health rating is slightly worse. Patient is very satisfied with their life. Eyesight was rated as same. Hearing was rated as same. Patient feels that their emotional and mental health rating is slightly worse. Patients states they are never, rarely angry. Patient states they are never, rarely unusually tired/fatigued. Pain experienced in the last 7 days has been none. Patient states that he has experienced no weight loss or gain in last 6 months.     Depression Screening:   PHQ-9 Score: 0      Fall Risk Screening:   In the past year, patient has experienced: history of falling in past year    Number of falls: 2 or more  Injured during fall?: Yes    Feels unsteady when standing or walking?: Yes    Worried about falling?: Yes      Home Safety:  Patient has trouble with stairs inside or outside of their home. Patient has working smoke alarms and has working carbon monoxide detector. Home safety hazards include: none.     Nutrition:   Current diet is Regular.     Medications:   Patient is currently taking over-the-counter  supplements. OTC medications include: see medication list. Patient is able to manage medications.     Activities of Daily Living (ADLs)/Instrumental Activities of Daily Living (IADLs):   Walk and transfer into and out of bed and chair?: Yes  Dress and groom yourself?: Yes    Bathe or shower yourself?: No    Feed yourself? Yes  Do your laundry/housekeeping?: No  Manage your money, pay your bills and track your expenses?: Yes  Make your own meals?: Yes    Do your own shopping?: No    Previous Hospitalizations:   Any hospitalizations or ED visits within the last 12 months?: Yes    How many hospitalizations have you had in the last year?: 1-2    Advance Care Planning:   Living will: Yes    Advanced directive: Yes      PREVENTIVE SCREENINGS      Cardiovascular Screening:    General: Screening Current      Diabetes Screening:     General: Screening Current      Colorectal Cancer Screening:     General: Screening Not Indicated      Prostate Cancer Screening:    General: Screening Not Indicated      Abdominal Aortic Aneurysm (AAA) Screening:    Risk factors include: tobacco use        Lung Cancer Screening:     General: Screening Not Indicated    Screening, Brief Intervention, and Referral to Treatment (SBIRT)    Screening    Typical number of drinks in a week: 0    Single Item Drug Screening:  How often have you used an illegal drug (including marijuana) or a prescription medication for non-medical reasons in the past year? never    Single Item Drug Screen Score: 0  Interpretation: Negative screen for possible drug use disorder    Social Drivers of Health     Financial Resource Strain: Low Risk  (8/3/2023)    Overall Financial Resource Strain (CARDIA)     Difficulty of Paying Living Expenses: Not very hard   Food Insecurity: No Food Insecurity (12/27/2024)    Hunger Vital Sign     Worried About Running Out of Food in the Last Year: Never true     Ran Out of Food in the Last Year: Never true   Transportation Needs: No  "Transportation Needs (12/27/2024)    PRAPARE - Transportation     Lack of Transportation (Medical): No     Lack of Transportation (Non-Medical): No   Housing Stability: Low Risk  (12/27/2024)    Housing Stability Vital Sign     Unable to Pay for Housing in the Last Year: No     Number of Times Moved in the Last Year: 0     Homeless in the Last Year: No   Utilities: Not At Risk (12/27/2024)    [facility] Utilities     Threatened with loss of utilities: No     No results found.    Objective   /78 (BP Location: Left arm, Patient Position: Sitting, Cuff Size: Standard)   Pulse (!) 44   Temp 98.2 °F (36.8 °C) (Temporal)   Resp 16   Ht 6' 1\" (1.854 m)   SpO2 99%   BMI 25.81 kg/m²     Physical Exam  Vitals and nursing note reviewed.   Constitutional:       General: He is not in acute distress.     Appearance: Normal appearance. He is well-developed. He is not ill-appearing.   HENT:      Head: Normocephalic and atraumatic.   Eyes:      General:         Right eye: No discharge.         Left eye: No discharge.      Extraocular Movements: Extraocular movements intact.      Conjunctiva/sclera: Conjunctivae normal.      Pupils: Pupils are equal, round, and reactive to light.   Neck:      Vascular: No carotid bruit.   Cardiovascular:      Rate and Rhythm: Normal rate and regular rhythm.      Heart sounds: Normal heart sounds. No murmur heard.  Pulmonary:      Effort: Pulmonary effort is normal. No respiratory distress.      Breath sounds: Normal breath sounds. No wheezing or rhonchi.   Abdominal:      General: Abdomen is flat. Bowel sounds are normal.      Palpations: Abdomen is soft.      Tenderness: There is no abdominal tenderness. There is no guarding or rebound.   Musculoskeletal:      Right lower leg: Edema present.      Left lower leg: Edema present.      Comments: Patient primarily gets around with an electric wheelchair.  He can ambulate short distances with a walker with wheels.  Trace peripheral edema bilateral " lower extremities which is chronic   Lymphadenopathy:      Cervical: No cervical adenopathy.   Skin:     General: Skin is warm and dry.      Capillary Refill: Capillary refill takes less than 2 seconds.      Findings: Lesion present.      Comments: Patient with red nodular skin on anterior scalp area of his head.  There is no open wounds or discharge.  Skin appears dry and excoriated   Neurological:      Mental Status: He is alert. Mental status is at baseline.   Psychiatric:         Mood and Affect: Mood normal.         Behavior: Behavior normal.         Thought Content: Thought content normal.         Judgment: Judgment normal.

## 2024-12-27 NOTE — ASSESSMENT & PLAN NOTE
Skin cancer.  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  Patient was resistant to chemotherapy treatment I encouraged him to use his prescribed triamcinolone to decrease pruritus and redness of lesions.

## 2025-01-07 ENCOUNTER — RESULTS FOLLOW-UP (OUTPATIENT)
Dept: CARDIOLOGY CLINIC | Facility: CLINIC | Age: OVER 89
End: 2025-01-07

## 2025-01-07 ENCOUNTER — REMOTE DEVICE CLINIC VISIT (OUTPATIENT)
Dept: CARDIOLOGY CLINIC | Facility: CLINIC | Age: OVER 89
End: 2025-01-07
Payer: COMMERCIAL

## 2025-01-07 DIAGNOSIS — I47.10 SVT (SUPRAVENTRICULAR TACHYCARDIA) (HCC): ICD-10-CM

## 2025-01-07 DIAGNOSIS — Z95.0 PRESENCE OF PERMANENT CARDIAC PACEMAKER: Primary | ICD-10-CM

## 2025-01-07 DIAGNOSIS — I10 ESSENTIAL (PRIMARY) HYPERTENSION: ICD-10-CM

## 2025-01-07 PROCEDURE — 93294 REM INTERROG EVL PM/LDLS PM: CPT | Performed by: INTERNAL MEDICINE

## 2025-01-07 PROCEDURE — 93296 REM INTERROG EVL PM/IDS: CPT | Performed by: INTERNAL MEDICINE

## 2025-01-07 NOTE — PROGRESS NOTES
BSC DC PM/NOT MRI CONDITIONAL   LATITUDE TRANSMISSION:  BATTERY VOLTAGE ADEQUATE (8.5 YR.).  AP 42%  100% (CHB/DDD 50 PPM, -250 MS).  ALL LEAD PARAMETERS WITHIN NORMAL LIMITS.  NO SIGNIFICANT HIGH RATE EPISODES.  NORMAL DEVICE FUNCTION.  RG

## 2025-01-09 RX ORDER — HYDROCHLOROTHIAZIDE 12.5 MG/1
12.5 CAPSULE ORAL DAILY
Qty: 30 CAPSULE | Refills: 5 | Status: SHIPPED | OUTPATIENT
Start: 2025-01-09

## 2025-01-09 RX ORDER — METOPROLOL TARTRATE 25 MG/1
12.5 TABLET, FILM COATED ORAL EVERY 12 HOURS
Qty: 30 TABLET | Refills: 5 | Status: SHIPPED | OUTPATIENT
Start: 2025-01-09

## 2025-02-08 DIAGNOSIS — K59.00 CONSTIPATION, UNSPECIFIED CONSTIPATION TYPE: ICD-10-CM

## 2025-02-10 RX ORDER — POLYETHYLENE GLYCOL 3350 17 G/17G
POWDER, FOR SOLUTION ORAL
Qty: 510 G | Refills: 1 | Status: SHIPPED | OUTPATIENT
Start: 2025-02-10

## 2025-03-04 DIAGNOSIS — I35.9 AORTIC VALVE DISEASE: ICD-10-CM

## 2025-03-06 RX ORDER — AMLODIPINE BESYLATE 5 MG/1
5 TABLET ORAL 2 TIMES DAILY
Qty: 60 TABLET | Refills: 5 | Status: SHIPPED | OUTPATIENT
Start: 2025-03-06

## 2025-04-08 ENCOUNTER — RESULTS FOLLOW-UP (OUTPATIENT)
Dept: CARDIOLOGY CLINIC | Facility: CLINIC | Age: OVER 89
End: 2025-04-08

## 2025-04-08 ENCOUNTER — REMOTE DEVICE CLINIC VISIT (OUTPATIENT)
Dept: CARDIOLOGY CLINIC | Facility: CLINIC | Age: OVER 89
End: 2025-04-08
Payer: COMMERCIAL

## 2025-04-08 DIAGNOSIS — Z95.0 CARDIAC PACEMAKER IN SITU: Primary | ICD-10-CM

## 2025-04-08 PROCEDURE — 93296 REM INTERROG EVL PM/IDS: CPT | Performed by: INTERNAL MEDICINE

## 2025-04-08 PROCEDURE — 93294 REM INTERROG EVL PM/LDLS PM: CPT | Performed by: INTERNAL MEDICINE

## 2025-04-08 NOTE — PROGRESS NOTES
"Results for orders placed or performed in visit on 04/08/25   Cardiac EP device report    Narrative    BSC DC PM (DDD MODE)/NOT MRI CONDITIONAL  LATITUDE TRANSMISSION: PRESENTING EGRAM AP @ 50 BPM. BATTERY STATUS \"8 YRS.\" AP 43%  100%. ALL AVAILABLE LEAD PARAMETERS WITHIN NORMAL LIMITS. NO SIGNIFICANT HIGH RATE EPISODES. NORMAL DEVICE FUNCTION. NC         "

## 2025-04-09 DIAGNOSIS — K59.00 CONSTIPATION, UNSPECIFIED CONSTIPATION TYPE: ICD-10-CM

## 2025-04-10 RX ORDER — POLYETHYLENE GLYCOL 3350 17 G/17G
POWDER, FOR SOLUTION ORAL
Qty: 510 G | Refills: 1 | Status: SHIPPED | OUTPATIENT
Start: 2025-04-10

## 2025-07-08 ENCOUNTER — REMOTE DEVICE CLINIC VISIT (OUTPATIENT)
Dept: CARDIOLOGY CLINIC | Facility: CLINIC | Age: OVER 89
End: 2025-07-08
Payer: COMMERCIAL

## 2025-07-08 DIAGNOSIS — I47.10 SVT (SUPRAVENTRICULAR TACHYCARDIA) (HCC): ICD-10-CM

## 2025-07-08 DIAGNOSIS — I10 ESSENTIAL (PRIMARY) HYPERTENSION: ICD-10-CM

## 2025-07-08 DIAGNOSIS — Z95.0 PRESENCE OF PERMANENT CARDIAC PACEMAKER: Primary | ICD-10-CM

## 2025-07-08 PROCEDURE — 93294 REM INTERROG EVL PM/LDLS PM: CPT | Performed by: INTERNAL MEDICINE

## 2025-07-08 PROCEDURE — 93296 REM INTERROG EVL PM/IDS: CPT | Performed by: INTERNAL MEDICINE

## 2025-07-08 NOTE — PROGRESS NOTES
Results for orders placed or performed in visit on 07/08/25   Cardiac EP device report    Narrative    BSC DC PM (DDD MODE)/NOT MRI CONDITIONAL  CARELINK TRANSMISSION: BATTERY VOLTAGE ADEQUATE. ( 8 YRS) AP 44%  100%. ALL AVAILABLE LEAD PARAMETERS WITHIN NORMAL LIMITS. NO SIGNIFICANT HIGH RATE EPISODES. SINGLE PVC COUNT. NORMAL DEVICE FUNCTION.---ROONEY

## 2025-07-10 RX ORDER — METOPROLOL TARTRATE 25 MG/1
12.5 TABLET, FILM COATED ORAL EVERY 12 HOURS
Qty: 30 TABLET | Refills: 2 | Status: SHIPPED | OUTPATIENT
Start: 2025-07-10

## 2025-07-10 RX ORDER — HYDROCHLOROTHIAZIDE 12.5 MG/1
12.5 CAPSULE ORAL DAILY
Qty: 30 CAPSULE | Refills: 2 | Status: SHIPPED | OUTPATIENT
Start: 2025-07-10

## (undated) DEVICE — BIPOLAR SEALER 23-301-1 AQM MBS: Brand: AQUAMANTYS™

## (undated) DEVICE — CAPIT HIP BIPOLAR HEAD POR PRIMARY

## (undated) DEVICE — SUT VICRYL 2-0 CT-1 27 IN J259H

## (undated) DEVICE — AVITENE MICROFIBRILLAR COLLAGEN HEMOSTAT NON-WOVEN WEB: Brand: AVITENE NON-WOVEN WEB

## (undated) DEVICE — SUT STRATAFIX SPIRAL 3-0 PGA/PCL 30 X 30 CM SXMD2B408

## (undated) DEVICE — 450 ML BOTTLE OF 0.05% CHLORHEXIDINE GLUCONATE IN 99.95% STERILE WATER FOR IRRIGATION, USP AND APPLICATOR.: Brand: IRRISEPT ANTIMICROBIAL WOUND LAVAGE

## (undated) DEVICE — BETHLEHEM TOTAL HIP, KIT: Brand: CARDINAL HEALTH

## (undated) DEVICE — SUT VICRYL 1 CT-1 27 IN J261H

## (undated) DEVICE — PLASMABLADE PS200-040 4.0: Brand: PLASMABLADE™

## (undated) DEVICE — GLOVE SRG BIOGEL 7.5

## (undated) DEVICE — UROCATCH BAG

## (undated) DEVICE — INVIEW CLEAR LEGGINGS: Brand: CONVERTORS

## (undated) DEVICE — HOOD: Brand: FLYTE, SURGICOOL

## (undated) DEVICE — U-DRAPE: Brand: CONVERTORS

## (undated) DEVICE — RECIPROCATING BLADE HEAVY DUTY LONG, OFFSET  (77.6 X 0.77 X 11.2MM)

## (undated) DEVICE — DRESSING MEPILEX AG BORDER 4 X 8 IN

## (undated) DEVICE — SPECIMEN CONTAINER STERILE PEEL PACK

## (undated) DEVICE — GLOVE SRG BIOGEL ECLIPSE 7.5

## (undated) DEVICE — GLOVE INDICATOR PI UNDERGLOVE SZ 8 BLUE

## (undated) DEVICE — CAPIT HIP STEM POR PRIMARY

## (undated) DEVICE — NEEDLE HYPO 22G X 1-1/2 IN

## (undated) DEVICE — CATH URETERAL 5FR X 70 CM FLEX TIP POLYUR BARD

## (undated) DEVICE — 3M™ IOBAN™ 2 ANTIMICROBIAL INCISE DRAPE 6650EZ: Brand: IOBAN™ 2

## (undated) DEVICE — IMPERVIOUS STOCKINETTE: Brand: DEROYAL

## (undated) DEVICE — GUIDEWIRE STRGHT TIP 0.035 IN  SOLO PLUS

## (undated) DEVICE — PACK TUR

## (undated) DEVICE — 3M™ STERI-STRIP™ REINFORCED ADHESIVE SKIN CLOSURES, R1547, 1/2 IN X 4 IN (12 MM X 100 MM), 6 STRIPS/ENVELOPE: Brand: 3M™ STERI-STRIP™

## (undated) DEVICE — COBAN 4 IN STERILE

## (undated) DEVICE — SYRINGE 20ML LL

## (undated) DEVICE — CHLORHEXIDINE 4PCT 4 OZ

## (undated) DEVICE — STERILE SURGICAL LUBRICANT,  TUBE: Brand: SURGILUBE

## (undated) DEVICE — ABDUCTION PILLOW FOAM POSITIONER: Brand: CARDINAL HEALTH

## (undated) DEVICE — 3M™ STERI-STRIP™ COMPOUND BENZOIN TINCTURE 40 BAGS/CARTON 4 CARTONS/CASE C1544: Brand: 3M™ STERI-STRIP™

## (undated) DEVICE — DRESSING MEPILEX AG BORDER 4 X 4 IN

## (undated) DEVICE — SUT VICRYL 0 CT-1 27 IN J260H

## (undated) DEVICE — PREMIUM DRY TRAY LF: Brand: MEDLINE INDUSTRIES, INC.

## (undated) DEVICE — LIGHT HANDLE COVER SLEEVE DISP BLUE STELLAR